# Patient Record
Sex: MALE | Race: WHITE | NOT HISPANIC OR LATINO | Employment: OTHER | URBAN - METROPOLITAN AREA
[De-identification: names, ages, dates, MRNs, and addresses within clinical notes are randomized per-mention and may not be internally consistent; named-entity substitution may affect disease eponyms.]

---

## 2023-11-16 ENCOUNTER — HOSPITAL ENCOUNTER (INPATIENT)
Facility: HOSPITAL | Age: 78
LOS: 3 days | Discharge: HOME | DRG: 871 | End: 2023-11-20
Attending: EMERGENCY MEDICINE | Admitting: INTERNAL MEDICINE
Payer: OTHER GOVERNMENT

## 2023-11-16 ENCOUNTER — APPOINTMENT (OUTPATIENT)
Dept: GENERAL RADIOLOGY | Facility: HOSPITAL | Age: 78
DRG: 871 | End: 2023-11-16
Payer: OTHER GOVERNMENT

## 2023-11-16 DIAGNOSIS — J44.1 COPD WITH ACUTE EXACERBATION: ICD-10-CM

## 2023-11-16 DIAGNOSIS — J18.9 PNEUMONIA DUE TO INFECTIOUS ORGANISM, UNSPECIFIED LATERALITY, UNSPECIFIED PART OF LUNG: Primary | ICD-10-CM

## 2023-11-16 DIAGNOSIS — A41.9 SEPSIS, DUE TO UNSPECIFIED ORGANISM, UNSPECIFIED WHETHER ACUTE ORGAN DYSFUNCTION PRESENT: ICD-10-CM

## 2023-11-16 DIAGNOSIS — J96.01 ACUTE RESPIRATORY FAILURE WITH HYPOXIA: ICD-10-CM

## 2023-11-16 DIAGNOSIS — A41.9 SEPTIC SHOCK: ICD-10-CM

## 2023-11-16 DIAGNOSIS — R65.21 SEPTIC SHOCK: ICD-10-CM

## 2023-11-16 LAB
ARTERIAL PATENCY WRIST A: POSITIVE
ATMOSPHERIC PRESS: 750.3 MMHG
BASE EXCESS BLDA CALC-SCNC: -0.5 MMOL/L (ref 0–2)
BDY SITE: ABNORMAL
CO2 BLDA-SCNC: 27.4 MMOL/L (ref 23–27)
DEVICE COMMENT: ABNORMAL
HCO3 BLDA-SCNC: 25.9 MMOL/L (ref 22–28)
HEMODILUTION: NO
INHALED O2 CONCENTRATION: 50 %
MODALITY: ABNORMAL
O2 A-A PPRESDIFF RESPIRATORY: 0.4 MMHG
PAW @ PEAK INSP FLOW SETTING VENT: 16 CMH2O
PCO2 BLDA: 47.6 MM HG (ref 35–45)
PEEP RESPIRATORY: 8 CM[H2O]
PH BLDA: 7.34 PH UNITS (ref 7.35–7.45)
PO2 BLDA: 135.2 MM HG (ref 80–100)
PSV: 7 CMH2O
SAO2 % BLDCOA: 98.9 % (ref 92–98.5)
SET MECH RESP RATE: 18
TOTAL RATE: 36 BREATHS/MINUTE
VENTILATOR MODE: ABNORMAL

## 2023-11-16 PROCEDURE — 87040 BLOOD CULTURE FOR BACTERIA: CPT | Performed by: EMERGENCY MEDICINE

## 2023-11-16 PROCEDURE — 71045 X-RAY EXAM CHEST 1 VIEW: CPT

## 2023-11-16 PROCEDURE — 80053 COMPREHEN METABOLIC PANEL: CPT | Performed by: EMERGENCY MEDICINE

## 2023-11-16 PROCEDURE — 82803 BLOOD GASES ANY COMBINATION: CPT

## 2023-11-16 PROCEDURE — 94799 UNLISTED PULMONARY SVC/PX: CPT

## 2023-11-16 PROCEDURE — 94660 CPAP INITIATION&MGMT: CPT

## 2023-11-16 PROCEDURE — 36415 COLL VENOUS BLD VENIPUNCTURE: CPT | Performed by: EMERGENCY MEDICINE

## 2023-11-16 PROCEDURE — 36600 WITHDRAWAL OF ARTERIAL BLOOD: CPT

## 2023-11-16 PROCEDURE — 25810000003 SODIUM CHLORIDE 0.9 % SOLUTION: Performed by: EMERGENCY MEDICINE

## 2023-11-16 PROCEDURE — 93010 ELECTROCARDIOGRAM REPORT: CPT | Performed by: INTERNAL MEDICINE

## 2023-11-16 PROCEDURE — 93005 ELECTROCARDIOGRAM TRACING: CPT | Performed by: EMERGENCY MEDICINE

## 2023-11-16 PROCEDURE — 84484 ASSAY OF TROPONIN QUANT: CPT | Performed by: EMERGENCY MEDICINE

## 2023-11-16 PROCEDURE — 85730 THROMBOPLASTIN TIME PARTIAL: CPT | Performed by: EMERGENCY MEDICINE

## 2023-11-16 PROCEDURE — 83605 ASSAY OF LACTIC ACID: CPT | Performed by: EMERGENCY MEDICINE

## 2023-11-16 PROCEDURE — 85379 FIBRIN DEGRADATION QUANT: CPT | Performed by: EMERGENCY MEDICINE

## 2023-11-16 PROCEDURE — 0202U NFCT DS 22 TRGT SARS-COV-2: CPT | Performed by: EMERGENCY MEDICINE

## 2023-11-16 PROCEDURE — 83880 ASSAY OF NATRIURETIC PEPTIDE: CPT | Performed by: EMERGENCY MEDICINE

## 2023-11-16 PROCEDURE — 85610 PROTHROMBIN TIME: CPT | Performed by: EMERGENCY MEDICINE

## 2023-11-16 PROCEDURE — 99285 EMERGENCY DEPT VISIT HI MDM: CPT

## 2023-11-16 PROCEDURE — 83735 ASSAY OF MAGNESIUM: CPT | Performed by: EMERGENCY MEDICINE

## 2023-11-16 PROCEDURE — 25010000002 CEFTRIAXONE PER 250 MG: Performed by: EMERGENCY MEDICINE

## 2023-11-16 PROCEDURE — 85025 COMPLETE CBC W/AUTO DIFF WBC: CPT | Performed by: EMERGENCY MEDICINE

## 2023-11-16 RX ORDER — SODIUM CHLORIDE 0.9 % (FLUSH) 0.9 %
10 SYRINGE (ML) INJECTION AS NEEDED
Status: DISCONTINUED | OUTPATIENT
Start: 2023-11-16 | End: 2023-11-20 | Stop reason: HOSPADM

## 2023-11-16 RX ADMIN — CEFTRIAXONE SODIUM 1000 MG: 1 INJECTION, POWDER, FOR SOLUTION INTRAMUSCULAR; INTRAVENOUS at 23:25

## 2023-11-16 RX ADMIN — SODIUM CHLORIDE 1000 ML: 9 INJECTION, SOLUTION INTRAVENOUS at 23:21

## 2023-11-17 ENCOUNTER — APPOINTMENT (OUTPATIENT)
Dept: CT IMAGING | Facility: HOSPITAL | Age: 78
DRG: 871 | End: 2023-11-17
Payer: OTHER GOVERNMENT

## 2023-11-17 PROBLEM — A41.9 SEPTIC SHOCK: Status: ACTIVE | Noted: 2023-11-17

## 2023-11-17 PROBLEM — R65.21 SEPTIC SHOCK: Status: ACTIVE | Noted: 2023-11-17

## 2023-11-17 LAB
ALBUMIN SERPL-MCNC: 3.9 G/DL (ref 3.5–5.2)
ALBUMIN/GLOB SERPL: 2 G/DL
ALP SERPL-CCNC: 148 U/L (ref 39–117)
ALT SERPL W P-5'-P-CCNC: 43 U/L (ref 1–41)
ANION GAP SERPL CALCULATED.3IONS-SCNC: 10 MMOL/L (ref 5–15)
APTT PPP: 167.8 SECONDS (ref 22.7–35.4)
APTT PPP: 24.8 SECONDS (ref 22.7–35.4)
APTT PPP: 36 SECONDS (ref 22.7–35.4)
AST SERPL-CCNC: 23 U/L (ref 1–40)
B PARAPERT DNA SPEC QL NAA+PROBE: NOT DETECTED
B PERT DNA SPEC QL NAA+PROBE: NOT DETECTED
BASOPHILS # BLD AUTO: 0.05 10*3/MM3 (ref 0–0.2)
BASOPHILS NFR BLD AUTO: 0.2 % (ref 0–1.5)
BILIRUB SERPL-MCNC: 0.7 MG/DL (ref 0–1.2)
BUN SERPL-MCNC: 19 MG/DL (ref 8–23)
BUN/CREAT SERPL: 16.4 (ref 7–25)
C PNEUM DNA NPH QL NAA+NON-PROBE: NOT DETECTED
CALCIUM SPEC-SCNC: 8.7 MG/DL (ref 8.6–10.5)
CHLORIDE SERPL-SCNC: 101 MMOL/L (ref 98–107)
CO2 SERPL-SCNC: 24 MMOL/L (ref 22–29)
CREAT SERPL-MCNC: 1.16 MG/DL (ref 0.76–1.27)
D DIMER PPP FEU-MCNC: 6.47 MCGFEU/ML (ref 0–0.78)
D-LACTATE SERPL-SCNC: 1.8 MMOL/L (ref 0.5–2)
D-LACTATE SERPL-SCNC: 2.6 MMOL/L (ref 0.5–2)
DEPRECATED RDW RBC AUTO: 42.1 FL (ref 37–54)
EGFRCR SERPLBLD CKD-EPI 2021: 64.5 ML/MIN/1.73
EOSINOPHIL # BLD AUTO: 0.04 10*3/MM3 (ref 0–0.4)
EOSINOPHIL NFR BLD AUTO: 0.2 % (ref 0.3–6.2)
ERYTHROCYTE [DISTWIDTH] IN BLOOD BY AUTOMATED COUNT: 12.6 % (ref 12.3–15.4)
FLUAV SUBTYP SPEC NAA+PROBE: NOT DETECTED
FLUBV RNA ISLT QL NAA+PROBE: NOT DETECTED
GEN 5 2HR TROPONIN T REFLEX: 41 NG/L
GLOBULIN UR ELPH-MCNC: 2 GM/DL
GLUCOSE BLDC GLUCOMTR-MCNC: 108 MG/DL (ref 70–130)
GLUCOSE BLDC GLUCOMTR-MCNC: 125 MG/DL (ref 70–130)
GLUCOSE BLDC GLUCOMTR-MCNC: 154 MG/DL (ref 70–130)
GLUCOSE BLDC GLUCOMTR-MCNC: 168 MG/DL (ref 70–130)
GLUCOSE SERPL-MCNC: 113 MG/DL (ref 65–99)
HADV DNA SPEC NAA+PROBE: NOT DETECTED
HCOV 229E RNA SPEC QL NAA+PROBE: NOT DETECTED
HCOV HKU1 RNA SPEC QL NAA+PROBE: NOT DETECTED
HCOV NL63 RNA SPEC QL NAA+PROBE: NOT DETECTED
HCOV OC43 RNA SPEC QL NAA+PROBE: NOT DETECTED
HCT VFR BLD AUTO: 45.7 % (ref 37.5–51)
HGB BLD-MCNC: 15.4 G/DL (ref 13–17.7)
HMPV RNA NPH QL NAA+NON-PROBE: NOT DETECTED
HPIV1 RNA ISLT QL NAA+PROBE: NOT DETECTED
HPIV2 RNA SPEC QL NAA+PROBE: NOT DETECTED
HPIV3 RNA NPH QL NAA+PROBE: NOT DETECTED
HPIV4 P GENE NPH QL NAA+PROBE: NOT DETECTED
IMM GRANULOCYTES # BLD AUTO: 0.17 10*3/MM3 (ref 0–0.05)
IMM GRANULOCYTES NFR BLD AUTO: 0.7 % (ref 0–0.5)
INR PPP: 0.93 (ref 0.9–1.1)
LYMPHOCYTES # BLD AUTO: 1.01 10*3/MM3 (ref 0.7–3.1)
LYMPHOCYTES NFR BLD AUTO: 4.4 % (ref 19.6–45.3)
M PNEUMO IGG SER IA-ACNC: NOT DETECTED
MAGNESIUM SERPL-MCNC: 1.1 MG/DL (ref 1.6–2.4)
MAGNESIUM SERPL-MCNC: 1.9 MG/DL (ref 1.6–2.4)
MCH RBC QN AUTO: 30.7 PG (ref 26.6–33)
MCHC RBC AUTO-ENTMCNC: 33.7 G/DL (ref 31.5–35.7)
MCV RBC AUTO: 91 FL (ref 79–97)
MONOCYTES # BLD AUTO: 0.5 10*3/MM3 (ref 0.1–0.9)
MONOCYTES NFR BLD AUTO: 2.2 % (ref 5–12)
NEUTROPHILS NFR BLD AUTO: 21.14 10*3/MM3 (ref 1.7–7)
NEUTROPHILS NFR BLD AUTO: 92.3 % (ref 42.7–76)
NRBC BLD AUTO-RTO: 0 /100 WBC (ref 0–0.2)
NT-PROBNP SERPL-MCNC: 1465 PG/ML (ref 0–1800)
PLATELET # BLD AUTO: 144 10*3/MM3 (ref 140–450)
PMV BLD AUTO: 9.4 FL (ref 6–12)
POTASSIUM SERPL-SCNC: 2.7 MMOL/L (ref 3.5–5.2)
POTASSIUM SERPL-SCNC: 4.9 MMOL/L (ref 3.5–5.2)
PROCALCITONIN SERPL-MCNC: 1.55 NG/ML (ref 0–0.25)
PROT SERPL-MCNC: 5.9 G/DL (ref 6–8.5)
PROTHROMBIN TIME: 12.6 SECONDS (ref 11.7–14.2)
QT INTERVAL: 306 MS
QTC INTERVAL: 434 MS
RBC # BLD AUTO: 5.02 10*6/MM3 (ref 4.14–5.8)
RHINOVIRUS RNA SPEC NAA+PROBE: NOT DETECTED
RSV RNA NPH QL NAA+NON-PROBE: NOT DETECTED
SARS-COV-2 RNA NPH QL NAA+NON-PROBE: NOT DETECTED
SODIUM SERPL-SCNC: 135 MMOL/L (ref 136–145)
TROPONIN T DELTA: 13 NG/L
TROPONIN T SERPL HS-MCNC: 28 NG/L
WBC NRBC COR # BLD AUTO: 22.91 10*3/MM3 (ref 3.4–10.8)

## 2023-11-17 PROCEDURE — 25010000002 PIPERACILLIN SOD-TAZOBACTAM PER 1 G: Performed by: INTERNAL MEDICINE

## 2023-11-17 PROCEDURE — 94799 UNLISTED PULMONARY SVC/PX: CPT

## 2023-11-17 PROCEDURE — 25010000002 METHYLPREDNISOLONE PER 40 MG: Performed by: INTERNAL MEDICINE

## 2023-11-17 PROCEDURE — 25010000002 AZITHROMYCIN PER 500 MG: Performed by: EMERGENCY MEDICINE

## 2023-11-17 PROCEDURE — 94660 CPAP INITIATION&MGMT: CPT

## 2023-11-17 PROCEDURE — 71275 CT ANGIOGRAPHY CHEST: CPT

## 2023-11-17 PROCEDURE — 25010000002 ENOXAPARIN PER 10 MG: Performed by: INTERNAL MEDICINE

## 2023-11-17 PROCEDURE — 25810000003 SODIUM CHLORIDE 0.9 % SOLUTION 250 ML FLEX CONT: Performed by: EMERGENCY MEDICINE

## 2023-11-17 PROCEDURE — 84484 ASSAY OF TROPONIN QUANT: CPT | Performed by: EMERGENCY MEDICINE

## 2023-11-17 PROCEDURE — 94761 N-INVAS EAR/PLS OXIMETRY MLT: CPT

## 2023-11-17 PROCEDURE — 94664 DEMO&/EVAL PT USE INHALER: CPT

## 2023-11-17 PROCEDURE — 85730 THROMBOPLASTIN TIME PARTIAL: CPT | Performed by: INTERNAL MEDICINE

## 2023-11-17 PROCEDURE — 93005 ELECTROCARDIOGRAM TRACING: CPT | Performed by: INTERNAL MEDICINE

## 2023-11-17 PROCEDURE — 25010000002 METHYLPREDNISOLONE PER 125 MG: Performed by: EMERGENCY MEDICINE

## 2023-11-17 PROCEDURE — 84145 PROCALCITONIN (PCT): CPT | Performed by: INTERNAL MEDICINE

## 2023-11-17 PROCEDURE — 25810000003 SODIUM CHLORIDE 0.9 % SOLUTION: Performed by: EMERGENCY MEDICINE

## 2023-11-17 PROCEDURE — 84132 ASSAY OF SERUM POTASSIUM: CPT | Performed by: INTERNAL MEDICINE

## 2023-11-17 PROCEDURE — 83735 ASSAY OF MAGNESIUM: CPT | Performed by: INTERNAL MEDICINE

## 2023-11-17 PROCEDURE — 83605 ASSAY OF LACTIC ACID: CPT | Performed by: EMERGENCY MEDICINE

## 2023-11-17 PROCEDURE — 25810000003 SODIUM CHLORIDE 0.9 % SOLUTION: Performed by: INTERNAL MEDICINE

## 2023-11-17 PROCEDURE — 93010 ELECTROCARDIOGRAM REPORT: CPT | Performed by: INTERNAL MEDICINE

## 2023-11-17 PROCEDURE — 25010000002 MAGNESIUM SULFATE 2 GM/50ML SOLUTION: Performed by: FAMILY MEDICINE

## 2023-11-17 PROCEDURE — 82948 REAGENT STRIP/BLOOD GLUCOSE: CPT

## 2023-11-17 PROCEDURE — 25510000001 IOPAMIDOL PER 1 ML: Performed by: INTERNAL MEDICINE

## 2023-11-17 PROCEDURE — 25010000002 HEPARIN (PORCINE) PER 1000 UNITS: Performed by: INTERNAL MEDICINE

## 2023-11-17 PROCEDURE — 63710000001 INSULIN LISPRO (HUMAN) PER 5 UNITS: Performed by: INTERNAL MEDICINE

## 2023-11-17 PROCEDURE — 83735 ASSAY OF MAGNESIUM: CPT | Performed by: FAMILY MEDICINE

## 2023-11-17 PROCEDURE — 92610 EVALUATE SWALLOWING FUNCTION: CPT

## 2023-11-17 PROCEDURE — 99254 IP/OBS CNSLTJ NEW/EST MOD 60: CPT | Performed by: INTERNAL MEDICINE

## 2023-11-17 PROCEDURE — 94640 AIRWAY INHALATION TREATMENT: CPT

## 2023-11-17 PROCEDURE — 25010000002 METHYLPREDNISOLONE PER 125 MG: Performed by: INTERNAL MEDICINE

## 2023-11-17 RX ORDER — METOPROLOL TARTRATE 50 MG/1
75 TABLET, FILM COATED ORAL 2 TIMES DAILY
COMMUNITY

## 2023-11-17 RX ORDER — METHYLPREDNISOLONE SODIUM SUCCINATE 40 MG/ML
40 INJECTION, POWDER, LYOPHILIZED, FOR SOLUTION INTRAMUSCULAR; INTRAVENOUS EVERY 8 HOURS
Status: DISCONTINUED | OUTPATIENT
Start: 2023-11-17 | End: 2023-11-18

## 2023-11-17 RX ORDER — ACETAMINOPHEN 325 MG/1
650 TABLET ORAL EVERY 4 HOURS PRN
Status: DISCONTINUED | OUTPATIENT
Start: 2023-11-17 | End: 2023-11-20 | Stop reason: HOSPADM

## 2023-11-17 RX ORDER — INSULIN LISPRO 100 [IU]/ML
2-7 INJECTION, SOLUTION INTRAVENOUS; SUBCUTANEOUS
Status: DISCONTINUED | OUTPATIENT
Start: 2023-11-17 | End: 2023-11-19

## 2023-11-17 RX ORDER — IPRATROPIUM BROMIDE AND ALBUTEROL SULFATE 2.5; .5 MG/3ML; MG/3ML
3 SOLUTION RESPIRATORY (INHALATION) ONCE
Status: COMPLETED | OUTPATIENT
Start: 2023-11-17 | End: 2023-11-17

## 2023-11-17 RX ORDER — HEPARIN SODIUM 5000 [USP'U]/ML
40-80 INJECTION, SOLUTION INTRAVENOUS; SUBCUTANEOUS EVERY 6 HOURS PRN
Status: DISCONTINUED | OUTPATIENT
Start: 2023-11-17 | End: 2023-11-18

## 2023-11-17 RX ORDER — BISACODYL 5 MG/1
5 TABLET, DELAYED RELEASE ORAL DAILY PRN
Status: DISCONTINUED | OUTPATIENT
Start: 2023-11-17 | End: 2023-11-20 | Stop reason: HOSPADM

## 2023-11-17 RX ORDER — NICOTINE POLACRILEX 4 MG
15 LOZENGE BUCCAL
Status: DISCONTINUED | OUTPATIENT
Start: 2023-11-17 | End: 2023-11-19

## 2023-11-17 RX ORDER — POLYETHYLENE GLYCOL 3350 17 G/17G
17 POWDER, FOR SOLUTION ORAL DAILY
Status: DISCONTINUED | OUTPATIENT
Start: 2023-11-17 | End: 2023-11-20 | Stop reason: HOSPADM

## 2023-11-17 RX ORDER — FAMOTIDINE 20 MG/1
20 TABLET, FILM COATED ORAL 2 TIMES DAILY
COMMUNITY

## 2023-11-17 RX ORDER — NITROGLYCERIN 0.4 MG/1
0.4 TABLET SUBLINGUAL
Status: DISCONTINUED | OUTPATIENT
Start: 2023-11-17 | End: 2023-11-20 | Stop reason: HOSPADM

## 2023-11-17 RX ORDER — ALUMINA, MAGNESIA, AND SIMETHICONE 2400; 2400; 240 MG/30ML; MG/30ML; MG/30ML
15 SUSPENSION ORAL EVERY 6 HOURS PRN
Status: DISCONTINUED | OUTPATIENT
Start: 2023-11-17 | End: 2023-11-20 | Stop reason: HOSPADM

## 2023-11-17 RX ORDER — POLYETHYLENE GLYCOL 3350 17 G/17G
17 POWDER, FOR SOLUTION ORAL DAILY
COMMUNITY

## 2023-11-17 RX ORDER — FAMOTIDINE 20 MG/1
20 TABLET, FILM COATED ORAL 2 TIMES DAILY
Status: DISCONTINUED | OUTPATIENT
Start: 2023-11-17 | End: 2023-11-20 | Stop reason: HOSPADM

## 2023-11-17 RX ORDER — MAGNESIUM SULFATE HEPTAHYDRATE 40 MG/ML
2 INJECTION, SOLUTION INTRAVENOUS
Status: COMPLETED | OUTPATIENT
Start: 2023-11-17 | End: 2023-11-18

## 2023-11-17 RX ORDER — POTASSIUM CHLORIDE 750 MG/1
40 TABLET, FILM COATED, EXTENDED RELEASE ORAL EVERY 4 HOURS
Status: COMPLETED | OUTPATIENT
Start: 2023-11-17 | End: 2023-11-18

## 2023-11-17 RX ORDER — GUAIFENESIN AND DEXTROMETHORPHAN HYDROBROMIDE 100; 10 MG/5ML; MG/5ML
5 SOLUTION ORAL EVERY 6 HOURS PRN
COMMUNITY

## 2023-11-17 RX ORDER — ATORVASTATIN CALCIUM 80 MG/1
80 TABLET, FILM COATED ORAL DAILY
COMMUNITY

## 2023-11-17 RX ORDER — IPRATROPIUM BROMIDE AND ALBUTEROL SULFATE 2.5; .5 MG/3ML; MG/3ML
3 SOLUTION RESPIRATORY (INHALATION)
Status: DISCONTINUED | OUTPATIENT
Start: 2023-11-17 | End: 2023-11-20 | Stop reason: HOSPADM

## 2023-11-17 RX ORDER — METHYLPREDNISOLONE SODIUM SUCCINATE 125 MG/2ML
60 INJECTION, POWDER, LYOPHILIZED, FOR SOLUTION INTRAMUSCULAR; INTRAVENOUS EVERY 6 HOURS
Status: DISCONTINUED | OUTPATIENT
Start: 2023-11-17 | End: 2023-11-17

## 2023-11-17 RX ORDER — AMOXICILLIN 250 MG
2 CAPSULE ORAL 2 TIMES DAILY
Status: DISCONTINUED | OUTPATIENT
Start: 2023-11-17 | End: 2023-11-20 | Stop reason: HOSPADM

## 2023-11-17 RX ORDER — FLUTICASONE PROPIONATE 50 MCG
2 SPRAY, SUSPENSION (ML) NASAL DAILY
COMMUNITY

## 2023-11-17 RX ORDER — ONDANSETRON 4 MG/1
4 TABLET, FILM COATED ORAL EVERY 6 HOURS PRN
Status: DISCONTINUED | OUTPATIENT
Start: 2023-11-17 | End: 2023-11-20 | Stop reason: HOSPADM

## 2023-11-17 RX ORDER — ASPIRIN 81 MG/1
81 TABLET ORAL DAILY
Status: DISCONTINUED | OUTPATIENT
Start: 2023-11-17 | End: 2023-11-20 | Stop reason: HOSPADM

## 2023-11-17 RX ORDER — GUAIFENESIN 600 MG/1
1200 TABLET, EXTENDED RELEASE ORAL 2 TIMES DAILY
Status: DISCONTINUED | OUTPATIENT
Start: 2023-11-17 | End: 2023-11-20 | Stop reason: HOSPADM

## 2023-11-17 RX ORDER — GUAIFENESIN 600 MG/1
1200 TABLET, EXTENDED RELEASE ORAL 2 TIMES DAILY
COMMUNITY

## 2023-11-17 RX ORDER — HEPARIN SODIUM 10000 [USP'U]/100ML
18 INJECTION, SOLUTION INTRAVENOUS
Status: DISCONTINUED | OUTPATIENT
Start: 2023-11-17 | End: 2023-11-18

## 2023-11-17 RX ORDER — NOREPINEPHRINE BITARTRATE 0.03 MG/ML
.02-.3 INJECTION, SOLUTION INTRAVENOUS
Status: DISCONTINUED | OUTPATIENT
Start: 2023-11-17 | End: 2023-11-17

## 2023-11-17 RX ORDER — IBUPROFEN 600 MG/1
1 TABLET ORAL
Status: DISCONTINUED | OUTPATIENT
Start: 2023-11-17 | End: 2023-11-19

## 2023-11-17 RX ORDER — BISACODYL 10 MG
10 SUPPOSITORY, RECTAL RECTAL DAILY PRN
Status: DISCONTINUED | OUTPATIENT
Start: 2023-11-17 | End: 2023-11-20 | Stop reason: HOSPADM

## 2023-11-17 RX ORDER — GUAIFENESIN/DEXTROMETHORPHAN 100-10MG/5
5 SYRUP ORAL EVERY 6 HOURS PRN
Status: DISCONTINUED | OUTPATIENT
Start: 2023-11-17 | End: 2023-11-20 | Stop reason: HOSPADM

## 2023-11-17 RX ORDER — FLUTICASONE PROPIONATE 50 MCG
2 SPRAY, SUSPENSION (ML) NASAL DAILY
Status: DISCONTINUED | OUTPATIENT
Start: 2023-11-17 | End: 2023-11-20 | Stop reason: HOSPADM

## 2023-11-17 RX ORDER — ONDANSETRON 2 MG/ML
4 INJECTION INTRAMUSCULAR; INTRAVENOUS EVERY 6 HOURS PRN
Status: DISCONTINUED | OUTPATIENT
Start: 2023-11-17 | End: 2023-11-20 | Stop reason: HOSPADM

## 2023-11-17 RX ORDER — METHYLPREDNISOLONE SODIUM SUCCINATE 125 MG/2ML
125 INJECTION, POWDER, LYOPHILIZED, FOR SOLUTION INTRAMUSCULAR; INTRAVENOUS ONCE
Status: COMPLETED | OUTPATIENT
Start: 2023-11-17 | End: 2023-11-17

## 2023-11-17 RX ORDER — POLYETHYLENE GLYCOL 3350 17 G/17G
17 POWDER, FOR SOLUTION ORAL DAILY PRN
Status: DISCONTINUED | OUTPATIENT
Start: 2023-11-17 | End: 2023-11-20 | Stop reason: HOSPADM

## 2023-11-17 RX ORDER — ALBUTEROL SULFATE 90 UG/1
2 AEROSOL, METERED RESPIRATORY (INHALATION) EVERY 4 HOURS PRN
COMMUNITY

## 2023-11-17 RX ORDER — ENOXAPARIN SODIUM 100 MG/ML
40 INJECTION SUBCUTANEOUS DAILY
Status: DISCONTINUED | OUTPATIENT
Start: 2023-11-17 | End: 2023-11-17

## 2023-11-17 RX ORDER — SODIUM CHLORIDE FOR INHALATION 7 %
4 VIAL, NEBULIZER (ML) INHALATION
Status: DISCONTINUED | OUTPATIENT
Start: 2023-11-17 | End: 2023-11-20 | Stop reason: HOSPADM

## 2023-11-17 RX ORDER — PREDNISONE 10 MG/1
TABLET ORAL SEE ADMIN INSTRUCTIONS
COMMUNITY
End: 2023-11-20 | Stop reason: HOSPADM

## 2023-11-17 RX ORDER — ATORVASTATIN CALCIUM 80 MG/1
80 TABLET, FILM COATED ORAL NIGHTLY
Status: DISCONTINUED | OUTPATIENT
Start: 2023-11-17 | End: 2023-11-20 | Stop reason: HOSPADM

## 2023-11-17 RX ORDER — DEXTROSE MONOHYDRATE 25 G/50ML
25 INJECTION, SOLUTION INTRAVENOUS
Status: DISCONTINUED | OUTPATIENT
Start: 2023-11-17 | End: 2023-11-19

## 2023-11-17 RX ORDER — SODIUM CHLORIDE 9 MG/ML
50 INJECTION, SOLUTION INTRAVENOUS CONTINUOUS
Status: ACTIVE | OUTPATIENT
Start: 2023-11-17 | End: 2023-11-18

## 2023-11-17 RX ORDER — ACETAMINOPHEN 650 MG/1
650 SUPPOSITORY RECTAL EVERY 4 HOURS PRN
Status: DISCONTINUED | OUTPATIENT
Start: 2023-11-17 | End: 2023-11-20 | Stop reason: HOSPADM

## 2023-11-17 RX ORDER — ASPIRIN 81 MG/1
81 TABLET ORAL DAILY
COMMUNITY

## 2023-11-17 RX ADMIN — METHYLPREDNISOLONE SODIUM SUCCINATE 40 MG: 40 INJECTION INTRAMUSCULAR; INTRAVENOUS at 16:43

## 2023-11-17 RX ADMIN — SODIUM CHLORIDE 1000 ML: 9 INJECTION, SOLUTION INTRAVENOUS at 01:18

## 2023-11-17 RX ADMIN — Medication 4 ML: at 15:03

## 2023-11-17 RX ADMIN — IPRATROPIUM BROMIDE AND ALBUTEROL SULFATE 3 ML: 2.5; .5 SOLUTION RESPIRATORY (INHALATION) at 15:02

## 2023-11-17 RX ADMIN — GUAIFENESIN 1200 MG: 600 TABLET, EXTENDED RELEASE ORAL at 21:52

## 2023-11-17 RX ADMIN — SODIUM CHLORIDE 1000 ML: 9 INJECTION, SOLUTION INTRAVENOUS at 00:08

## 2023-11-17 RX ADMIN — METHYLPREDNISOLONE SODIUM SUCCINATE 60 MG: 125 INJECTION, POWDER, FOR SOLUTION INTRAMUSCULAR; INTRAVENOUS at 08:10

## 2023-11-17 RX ADMIN — POLYETHYLENE GLYCOL 3350 17 G: 17 POWDER, FOR SOLUTION ORAL at 17:45

## 2023-11-17 RX ADMIN — METHYLPREDNISOLONE SODIUM SUCCINATE 125 MG: 125 INJECTION, POWDER, FOR SOLUTION INTRAMUSCULAR; INTRAVENOUS at 01:05

## 2023-11-17 RX ADMIN — INSULIN LISPRO 2 UNITS: 100 INJECTION, SOLUTION INTRAVENOUS; SUBCUTANEOUS at 21:52

## 2023-11-17 RX ADMIN — AZITHROMYCIN MONOHYDRATE 500 MG: 500 INJECTION, POWDER, LYOPHILIZED, FOR SOLUTION INTRAVENOUS at 00:08

## 2023-11-17 RX ADMIN — ASPIRIN 81 MG: 81 TABLET, COATED ORAL at 17:41

## 2023-11-17 RX ADMIN — MAGNESIUM SULFATE HEPTAHYDRATE 2 G: 2 INJECTION, SOLUTION INTRAVENOUS at 21:53

## 2023-11-17 RX ADMIN — INSULIN LISPRO 2 UNITS: 100 INJECTION, SOLUTION INTRAVENOUS; SUBCUTANEOUS at 16:42

## 2023-11-17 RX ADMIN — Medication 10 ML: at 19:26

## 2023-11-17 RX ADMIN — SODIUM CHLORIDE 125 ML/HR: 9 INJECTION, SOLUTION INTRAVENOUS at 02:51

## 2023-11-17 RX ADMIN — SODIUM CHLORIDE 50 ML/HR: 9 INJECTION, SOLUTION INTRAVENOUS at 14:19

## 2023-11-17 RX ADMIN — MAGNESIUM SULFATE HEPTAHYDRATE 2 G: 2 INJECTION, SOLUTION INTRAVENOUS at 19:25

## 2023-11-17 RX ADMIN — ENOXAPARIN SODIUM 40 MG: 100 INJECTION SUBCUTANEOUS at 02:55

## 2023-11-17 RX ADMIN — METOPROLOL TARTRATE 75 MG: 25 TABLET, FILM COATED ORAL at 21:51

## 2023-11-17 RX ADMIN — POTASSIUM CHLORIDE 40 MEQ: 750 TABLET, EXTENDED RELEASE ORAL at 18:24

## 2023-11-17 RX ADMIN — DOCUSATE SODIUM 50MG AND SENNOSIDES 8.6MG 2 TABLET: 8.6; 5 TABLET, FILM COATED ORAL at 21:52

## 2023-11-17 RX ADMIN — IPRATROPIUM BROMIDE AND ALBUTEROL SULFATE 3 ML: 2.5; .5 SOLUTION RESPIRATORY (INHALATION) at 06:59

## 2023-11-17 RX ADMIN — PIPERACILLIN SODIUM AND TAZOBACTAM SODIUM 3.38 G: 3; .375 INJECTION, SOLUTION INTRAVENOUS at 02:53

## 2023-11-17 RX ADMIN — IPRATROPIUM BROMIDE AND ALBUTEROL SULFATE 3 ML: 2.5; .5 SOLUTION RESPIRATORY (INHALATION) at 19:13

## 2023-11-17 RX ADMIN — SACUBITRIL AND VALSARTAN 1 TABLET: 24; 26 TABLET, FILM COATED ORAL at 22:23

## 2023-11-17 RX ADMIN — IPRATROPIUM BROMIDE AND ALBUTEROL SULFATE 3 ML: 2.5; .5 SOLUTION RESPIRATORY (INHALATION) at 23:26

## 2023-11-17 RX ADMIN — PIPERACILLIN SODIUM AND TAZOBACTAM SODIUM 3.38 G: 3; .375 INJECTION, SOLUTION INTRAVENOUS at 10:15

## 2023-11-17 RX ADMIN — POTASSIUM CHLORIDE 40 MEQ: 750 TABLET, EXTENDED RELEASE ORAL at 22:21

## 2023-11-17 RX ADMIN — IPRATROPIUM BROMIDE AND ALBUTEROL SULFATE 3 ML: 2.5; .5 SOLUTION RESPIRATORY (INHALATION) at 00:42

## 2023-11-17 RX ADMIN — IPRATROPIUM BROMIDE AND ALBUTEROL SULFATE 3 ML: 2.5; .5 SOLUTION RESPIRATORY (INHALATION) at 03:13

## 2023-11-17 RX ADMIN — HEPARIN SODIUM 18 UNITS/KG/HR: 10000 INJECTION, SOLUTION INTRAVENOUS at 08:57

## 2023-11-17 RX ADMIN — Medication 4 ML: at 19:13

## 2023-11-17 RX ADMIN — ATORVASTATIN CALCIUM 80 MG: 80 TABLET, FILM COATED ORAL at 21:52

## 2023-11-17 RX ADMIN — FAMOTIDINE 20 MG: 20 TABLET ORAL at 21:53

## 2023-11-17 RX ADMIN — PIPERACILLIN SODIUM AND TAZOBACTAM SODIUM 3.38 G: 3; .375 INJECTION, SOLUTION INTRAVENOUS at 16:44

## 2023-11-17 RX ADMIN — IOPAMIDOL 95 ML: 755 INJECTION, SOLUTION INTRAVENOUS at 10:33

## 2023-11-17 RX ADMIN — Medication 0.02 MCG/KG/MIN: at 01:49

## 2023-11-17 RX ADMIN — IPRATROPIUM BROMIDE AND ALBUTEROL SULFATE 3 ML: 2.5; .5 SOLUTION RESPIRATORY (INHALATION) at 10:46

## 2023-11-17 RX ADMIN — MAGNESIUM SULFATE HEPTAHYDRATE 2 G: 2 INJECTION, SOLUTION INTRAVENOUS at 22:21

## 2023-11-17 NOTE — PROGRESS NOTES
Clinical Pharmacy Services: Medication History    Wisam Malone is a 77 y.o. male presenting to [facility] for   Chief Complaint   Patient presents with    Respiratory Distress    Shortness of Breath       He  has no past medical history on file.    Allergies as of 11/16/2023    (No Known Allergies)       Medication information was obtained from: Pharmacy  Pharmacy and Phone Number:   [facility] - [place], [place] - [address] [phone]  - [phone]   [address] [address]  Phone: [phone] Fax: [phone]        Prior to Admission Medications       Prescriptions Last Dose Informant Patient Reported? Taking?    albuterol sulfate  (90 Base) MCG/ACT inhaler  Pharmacy Yes Yes    Inhale 2 puffs Every 4 (Four) Hours As Needed for Wheezing.    aspirin 81 MG EC tablet  Pharmacy Yes Yes    Take 1 tablet by mouth Daily.    atorvastatin (LIPITOR) 80 MG tablet  Pharmacy Yes Yes    Take 1 tablet by mouth Daily.    dextromethorphan-guaifenesin (ROBITUSSIN-DM)  MG/5ML syrup  Pharmacy Yes Yes    Take 5 mL by mouth Every 6 (Six) Hours As Needed.    famotidine (PEPCID) 20 MG tablet  Pharmacy Yes Yes    Take 1 tablet by mouth 2 (Two) Times a Day.    fluticasone (FLONASE) 50 MCG/ACT nasal spray  Pharmacy Yes Yes    2 sprays into the nostril(s) as directed by provider Daily.    guaiFENesin (MUCINEX) 600 MG 12 hr tablet  Pharmacy Yes Yes    Take 2 tablets by mouth 2 (Two) Times a Day.    ipratropium-albuterol (COMBIVENT RESPIMAT)  MCG/ACT inhaler  Pharmacy Yes Yes    Inhale 1 puff 4 (Four) Times a Day As Needed for Wheezing.    metoprolol tartrate (LOPRESSOR) 50 MG tablet  Pharmacy Yes Yes    Take 1.5 tablets by mouth 2 (Two) Times a Day.    polyethylene glycol (MIRALAX) 17 g packet  Pharmacy Yes Yes    Take 17 g by mouth Daily.    predniSONE (DELTASONE) 10 MG tablet  Pharmacy Yes Yes    Take  by mouth See Admin Instructions. 6 TABLETS DAILY FOR 3 DAYS   4  TABLETS DAILY FOR 3 DAYS   2 TABLETS DAILY FOR 3 DAYS   1 TABLET DAILY FOR 3 DAYS    sacubitril-valsartan (ENTRESTO) 24-26 MG tablet  Pharmacy Yes Yes    Take 1 tablet by mouth 2 (Two) Times a Day.    tiotropium bromide monohydrate (SPIRIVA RESPIMAT) 2.5 MCG/ACT aerosol solution inhaler  Pharmacy Yes Yes    Inhale 2 puffs Daily.              Medication notes:     This medication list is complete to the best of my knowledge as of 11/17/2023    Please call if questions.    Daniel Avila  Medication History Technician  [phone]    11/17/2023 12:28 EST

## 2023-11-17 NOTE — THERAPY EVALUATION
Acute Care - Speech Language Pathology   Swallow Initial Evaluation [facility]     Patient Name: Wisam Malone  : 1945  MRN: [id number]  Today's Date: 2023               Admit Date: 2023    Visit Dx:     ICD-10-CM ICD-9-CM   1. Pneumonia due to infectious organism, unspecified laterality, unspecified part of lung  J18.9 486   2. Acute respiratory failure with hypoxia  J96.01 518.81   3. COPD with acute exacerbation  J44.1 491.21   4. Sepsis, due to unspecified organism, unspecified whether acute organ dysfunction present  A41.9 038.9     995.91   5. Septic shock  A41.9 038.9    R65.21 785.52     995.92     Patient Active Problem List   Diagnosis    Septic shock     History reviewed. No pertinent past medical history.  History reviewed. No pertinent surgical history.    SLP Recommendation and Plan  SLP Swallowing Diagnosis: R/O pharyngeal dysphagia (23)  SLP Diet Recommendation: regular textures, thin liquids (23)  Recommended Precautions and Strategies: upright posture during/after eating, small bites of food and sips of liquid (23)  SLP Rec. for Method of Medication Administration: meds whole, as tolerated (23)     Monitor for Signs of Aspiration: yes (23)  Recommended Diagnostics: VFSS (MBS) (23)  Swallow Criteria for Skilled Therapeutic Interventions Met: demonstrates skilled criteria (23)  Anticipated Discharge Disposition (SLP): unknown (23)  Rehab Potential/Prognosis, Swallowing: good, to achieve stated therapy goals (23)  Therapy Frequency (Swallow): PRN (23)  Predicted Duration Therapy Intervention (Days): until discharge (23)  Oral Care Recommendations: Oral Care BID/PRN (23)                                      Oral Care Recommendations: Oral Care BID/PRN (23)    Plan of Care Reviewed With: patient  Outcome Evaluation: Swallow eval completed.  Bipap removed for eval and sats remained >90. Pt had inconsistent slight throat clear/cough as Bipap was removed and inconsistently throughout the eval. No overt s/s directly after trials including thin, pureed, soft, mixed, and regular. Mastication was functional. Laryngeal elevation was adequate with palpation. MD concerned for aspiration. Recommend continue on regular and thin; meds as tolerated; urpight for meals and 30 min after; slow rate; small bites/sips. ST to schedule VFSS Sat/Mon to r/o aspiration.      SWALLOW EVALUATION (last 72 hours)       SLP Adult Swallow Evaluation       Row Name 11/17/23 1600                   Rehab Evaluation    Document Type evaluation  -AW        Subjective Information no complaints  -AW        Patient Observations alert;cooperative;agree to therapy  -AW        Patient/Family/Caregiver Comments/Observations Pt's spouse and other family members were present.  -AW        Patient Effort good  -AW        Symptoms Noted During/After Treatment none  -AW           General Information    Patient Profile Reviewed yes  -AW        Current Method of Nutrition regular textures;thin liquids  -AW        Precautions/Limitations, Vision WFL;for purposes of eval  -AW        Precautions/Limitations, Hearing WFL;for purposes of eval  -AW        Prior Level of Function-Communication WFL  -AW        Prior Level of Function-Swallowing no diet consistency restrictions  -AW        Plans/Goals Discussed with patient;family;spouse/S.O.;agreed upon  -AW        Barriers to Rehab medically complex  -AW        Patient's Goals for Discharge return home  -AW        Family Goals for Discharge family did not state  -AW           Pain    Additional Documentation Pain Scale: Numbers Pre/Post-Treatment (Group)  -AW           Pain Scale: Numbers Pre/Post-Treatment    Pretreatment Pain Rating 0/10 - no pain  -AW        Posttreatment Pain Rating 0/10 - no pain  -AW           Oral Motor Structure and Function     Dentition Assessment upper dentures/partial in place;lower dentures/partial in place  -AW        Secretion Management WNL/WFL  -AW        Mucosal Quality moist, healthy  -AW           Oral Musculature and Cranial Nerve Assessment    Oral Motor General Assessment WFL  -AW           General Eating/Swallowing Observations    Respiratory Support Currently in Use other (see comments)  BiPap removed for eval. Sats remained >90  -AW        Eating/Swallowing Skills self-fed  -AW        Positioning During Eating upright in bed  -AW        Utensils Used cup;spoon;straw  -AW        Consistencies Trialed thin liquids;pureed;soft to chew textures;mixed consistency;regular textures  -AW           Clinical Swallow Eval    Clinical Swallow Evaluation Summary Swallow eval completed. Bipap removed for eval and sats remained >90. Pt had inconsistent slight throat clear/cough as Bipap was removed and inconsistently throughout the eval. No overt s/s directly after trials including thin, pureed, soft, mixed, and regular. Mastication was functional. Laryngeal elevation was adequate with palpation. MD concerned for aspiration. Recommend continue on regular and thin; meds as tolerated; urpight for meals and 30 min after; slow rate; small bites/sips. ST to schedule VFSS Sat/Mon to r/o aspiration.  -AW           SLP Evaluation Clinical Impression    SLP Swallowing Diagnosis R/O pharyngeal dysphagia  -AW        Functional Impact risk of aspiration/pneumonia  -AW        Rehab Potential/Prognosis, Swallowing good, to achieve stated therapy goals  -AW        Swallow Criteria for Skilled Therapeutic Interventions Met demonstrates skilled criteria  -AW           Recommendations    Therapy Frequency (Swallow) PRN  -AW        Predicted Duration Therapy Intervention (Days) until discharge  -AW        SLP Diet Recommendation regular textures;thin liquids  -AW        Recommended Diagnostics VFSS (MBS)  -AW        Recommended Precautions and Strategies  upright posture during/after eating;small bites of food and sips of liquid  -AW        Oral Care Recommendations Oral Care BID/PRN  -AW        SLP Rec. for Method of Medication Administration meds whole;as tolerated  -AW        Monitor for Signs of Aspiration yes  -AW        Anticipated Discharge Disposition (SLP) unknown  -AW           Swallow Goals (SLP)    Swallow STGs diet tolerance goal selection (SLP)  -AW           (STG) Patient will tolerate trials of    Consistencies Trialed (Tolerate trials) regular textures;thin liquids  -AW        Desired Outcome (Tolerate trials) without signs/symptoms of aspiration  -AW        [place] (Tolerate trials) independently (over 90% accuracy)  -AW        Time Frame (Tolerate trials) by discharge  -AW                  User Key  (r) = Recorded By, (t) = Taken By, (c) = Cosigned By      Initials Name Effective Dates    Pinky Stacy SLP 08/28/23 -                     EDUCATION  The patient has been educated in the following areas:   Dysphagia (Swallowing Impairment) Oral Care/Hydration.        SLP GOALS       Row Name 11/17/23 1600             (STG) Patient will tolerate trials of    Consistencies Trialed (Tolerate trials) regular textures;thin liquids  -AW      Desired Outcome (Tolerate trials) without signs/symptoms of aspiration  -AW      [place] (Tolerate trials) independently (over 90% accuracy)  -AW      Time Frame (Tolerate trials) by discharge  -AW                User Key  (r) = Recorded By, (t) = Taken By, (c) = Cosigned By      Initials Name Provider Type    Pinky Stacy SLP Speech and Language Pathologist                       Time Calculation:    Time Calculation- SLP       Row Name 11/17/23 1710             Time Calculation- SLP    SLP Start Time 1500  -AW      SLP Received On 11/17/23  -AW                User Key  (r) = Recorded By, (t) = Taken By, (c) = Cosigned By      Initials Name Provider Type    Pinky Stayc SLP Speech and Language  Pathologist                    Therapy Charges for Today       Code Description Service Date Service Provider Modifiers Qty    [id number]  ST EVAL ORAL PHARYNG SWALLOW 4 11/17/2023 Pinky Forrest, SLP GN 1                 Pinky Forrest, SLP  11/17/2023

## 2023-11-17 NOTE — ED PROVIDER NOTES
" EMERGENCY DEPARTMENT ENCOUNTER    Room Number:  13/13  PCP: System, Provider Not In  Patient Care Team:  System, Provider Not In as PCP - General   Independent Historians: Patient and EMS    HPI:  Chief Complaint: Shortness of breath    A complete HPI/ROS/PMH/PSH/SH/FH are unobtainable due to: Patient distress    Chronic or social conditions impacting patient care (Social Determinants of Health): None  (Financial Resource Strain / Food Insecurity / Transportation Needs / Physical Activity / Stress / Social Connections / Intimate Partner Violence / Housing Stability)    Context: Wisam Malone is a 77 y.o. male with history of coronary artery disease status post bypass surgery twice, COPD, hypertension, A-fib/a flutter who presents to the ED c/o acute shortness of breath just a few hours prior to call to EMS.  Patient says he has been doing his nebulizer treatments at home with no relief and is not on home oxygen.  Patient denies any new cough, fevers, or any illness leading up to his increased shortness of breath this evening admits to chronic cough.  Patient does report history of \"heart problems\".  Patient denies any history of needing admission in this much distress or ever requiring BiPAP in the past although [facility]  Patient arrives on EMS CPAP with improvement in route from initial hypoxia in the 80s and severe respiratory distress per EMS crew.  EMS had given 2 sublingual nitro despite patient being normotensive thinking that he might be flash pulmonary edema.    Review of prior external notes (non-ED) -and- Review of prior external test results outside of this encounter: Patient has scant medical records in our system.  There is a single entry from an urgent care visit in November 2022.  Patient was noted then to be on home oxygen.  Patient at that time had COVID.           Prescription drug monitoring program review:         PAST MEDICAL HISTORY  Active Ambulatory " Problems     Diagnosis Date Noted    No Active Ambulatory Problems     Resolved Ambulatory Problems     Diagnosis Date Noted    No Resolved Ambulatory Problems     No Additional Past Medical History         PAST SURGICAL HISTORY  No past surgical history on file.      FAMILY HISTORY  No family history on file.      SOCIAL HISTORY  Social History     Socioeconomic History    Marital status:          ALLERGIES  Patient has no known allergies.        REVIEW OF SYSTEMS  Review of Systems  Included in HPI  All systems reviewed and negative except for those discussed in HPI.      PHYSICAL EXAM    I have reviewed the triage vital signs and nursing notes.    ED Triage Vitals [11/16/23 2228]   Temp Heart Rate Resp BP SpO2   -- (!) 126 25 119/84 93 %      Temp src Heart Rate Source Patient Position BP Location FiO2 (%)   -- -- -- -- --       Physical Exam  GENERAL: Tachypneic but cooperative and alert  male who has been placed on BiPAP by a respiratory therapist  SKIN: Warm, dry  HENT: Normocephalic, atraumatic  EYES: no scleral icterus, EOMI, no conjunctivitis  CV: regular rhythm, accelerated rate  RESPIRATORY: Tachypneic with rhonchi bilaterally in anterior lung fields   ABDOMEN: soft, nontender, nondistended  MUSCULOSKELETAL: no deformity, no lower extremity edema, no calf tenderness to palpation  NEURO: alert, moves all extremities, follows commands                                                               LAB RESULTS  Recent Results (from the past 24 hour(s))   Respiratory Panel PCR w/COVID-19(SARS-CoV-2) KUSHAL/JAMI/MENA/PAD/COR/GARY In-House, NP Swab in UTM/VTM, 2 HR TAT - Swab, Nasopharynx    Collection Time: 11/16/23 10:37 PM    Specimen: Nasopharynx; Swab   Result Value Ref Range    ADENOVIRUS, PCR Not Detected Not Detected    Coronavirus 229E Not Detected Not Detected    Coronavirus HKU1 Not Detected Not Detected    Coronavirus NL63 Not Detected Not Detected    Coronavirus OC43 Not Detected Not  Detected    COVID19 Not Detected Not Detected - Ref. Range    Human Metapneumovirus Not Detected Not Detected    Human Rhinovirus/Enterovirus Not Detected Not Detected    Influenza A PCR Not Detected Not Detected    Influenza B PCR Not Detected Not Detected    Parainfluenza Virus 1 Not Detected Not Detected    Parainfluenza Virus 2 Not Detected Not Detected    Parainfluenza Virus 3 Not Detected Not Detected    Parainfluenza Virus 4 Not Detected Not Detected    RSV, PCR Not Detected Not Detected    Bordetella pertussis pcr Not Detected Not Detected    Bordetella parapertussis PCR Not Detected Not Detected    Chlamydophila pneumoniae PCR Not Detected Not Detected    Mycoplasma pneumo by PCR Not Detected Not Detected   ECG 12 Lead Dyspnea    Collection Time: 11/16/23 10:45 PM   Result Value Ref Range    QT Interval 306 ms    QTC Interval 434 ms   Blood Gas, Arterial -    Collection Time: 11/16/23 10:58 PM    Specimen: Arterial Blood   Result Value Ref Range    Site Right Radial     Paul's Test Positive     pH, Arterial 7.344 (L) 7.350 - 7.450 pH units    pCO2, Arterial 47.6 (H) 35.0 - 45.0 mm Hg    pO2, Arterial 135.2 (H) 80.0 - 100.0 mm Hg    HCO3, Arterial 25.9 22.0 - 28.0 mmol/L    Base Excess, Arterial -0.5 (L) 0.0 - 2.0 mmol/L    O2 Saturation, Arterial 98.9 (H) 92.0 - 98.5 %    A-a DO2 0.4 mmHg    CO2 Content 27.4 (H) 23 - 27 mmol/L    Barometric Pressure for Blood Gas 750.3000 mmHg    Modality BiPap     FIO2 50 %    Ventilator Mode BiLevel     Set [facility] Resp Rate 18     Rate 36 Breaths/minute    PEEP 8     PSV 7 cmH2O    PIP 16 cmH2O    Hemodilution No     Device Comment IP 15, SpO2 97%    Comprehensive Metabolic Panel    Collection Time: 11/16/23 11:55 PM    Specimen: Blood   Result Value Ref Range    Glucose 113 (H) 65 - 99 mg/dL    BUN 19 8 - 23 mg/dL    Creatinine 1.16 0.76 - 1.27 mg/dL    Sodium 135 (L) 136 - 145 mmol/L    Potassium 4.9 3.5 - 5.2 mmol/L    Chloride 101 98 - 107 mmol/L    CO2 24.0 22.0 -  29.0 mmol/L    Calcium 8.7 8.6 - 10.5 mg/dL    Total Protein 5.9 (L) 6.0 - 8.5 g/dL    Albumin 3.9 3.5 - 5.2 g/dL    ALT (SGPT) 43 (H) 1 - 41 U/L    AST (SGOT) 23 1 - 40 U/L    Alkaline Phosphatase 148 (H) 39 - 117 U/L    Total Bilirubin 0.7 0.0 - 1.2 mg/dL    Globulin 2.0 gm/dL    A/G Ratio 2.0 g/dL    BUN/Creatinine Ratio 16.4 7.0 - 25.0    Anion Gap 10.0 5.0 - 15.0 mmol/L    eGFR 64.5 >60.0 mL/min/1.73   Protime-INR    Collection Time: 11/16/23 11:55 PM    Specimen: Blood   Result Value Ref Range    Protime 12.6 11.7 - 14.2 Seconds    INR 0.93 0.90 - 1.10   aPTT    Collection Time: 11/16/23 11:55 PM    Specimen: Blood   Result Value Ref Range    PTT 24.8 22.7 - 35.4 seconds   BNP    Collection Time: 11/16/23 11:55 PM    Specimen: Blood   Result Value Ref Range    proBNP 1,465.0 0.0 - 1,800.0 pg/mL   High Sensitivity Troponin T    Collection Time: 11/16/23 11:55 PM    Specimen: Blood   Result Value Ref Range    HS Troponin T 28 (H) <22 ng/L   D-dimer, Quantitative    Collection Time: 11/16/23 11:55 PM    Specimen: Blood   Result Value Ref Range    D-Dimer, Quantitative 6.47 (H) 0.00 - 0.78 MCGFEU/mL   Lactic Acid, Plasma    Collection Time: 11/16/23 11:55 PM    Specimen: Blood   Result Value Ref Range    Lactate 2.6 (C) 0.5 - 2.0 mmol/L   Magnesium    Collection Time: 11/16/23 11:55 PM    Specimen: Blood   Result Value Ref Range    Magnesium 1.9 1.6 - 2.4 mg/dL   CBC Auto Differential    Collection Time: 11/16/23 11:55 PM    Specimen: Blood   Result Value Ref Range    WBC 22.91 (H) 3.40 - 10.80 10*3/mm3    RBC 5.02 4.14 - 5.80 10*6/mm3    Hemoglobin 15.4 13.0 - 17.7 g/dL    Hematocrit 45.7 37.5 - 51.0 %    MCV 91.0 79.0 - 97.0 fL    MCH 30.7 26.6 - 33.0 pg    MCHC 33.7 31.5 - 35.7 g/dL    RDW 12.6 12.3 - 15.4 %    RDW-SD 42.1 37.0 - 54.0 fl    MPV 9.4 6.0 - 12.0 fL    Platelets 144 140 - 450 10*3/mm3    Neutrophil % 92.3 (H) 42.7 - 76.0 %    Lymphocyte % 4.4 (L) 19.6 - 45.3 %    Monocyte % 2.2 (L) 5.0 - 12.0 %     Eosinophil % 0.2 (L) 0.3 - 6.2 %    Basophil % 0.2 0.0 - 1.5 %    Immature Grans % 0.7 (H) 0.0 - 0.5 %    Neutrophils, Absolute 21.14 (H) 1.70 - 7.00 10*3/mm3    Lymphocytes, Absolute 1.01 0.70 - 3.10 10*3/mm3    Monocytes, Absolute 0.50 0.10 - 0.90 10*3/mm3    Eosinophils, Absolute 0.04 0.00 - 0.40 10*3/mm3    Basophils, Absolute 0.05 0.00 - 0.20 10*3/mm3    Immature Grans, Absolute 0.17 (H) 0.00 - 0.05 10*3/mm3    nRBC 0.0 0.0 - 0.2 /100 WBC   High Sensitivity Troponin T 2Hr    Collection Time: 11/17/23  2:06 AM    Specimen: Blood   Result Value Ref Range    HS Troponin T 41 (H) <22 ng/L    Troponin T Delta 13 (C) >=-4 - <+4 ng/L   STAT Lactic Acid, Reflex    Collection Time: 11/17/23  2:06 AM    Specimen: Blood   Result Value Ref Range    Lactate 1.8 0.5 - 2.0 mmol/L       I ordered the above labs and independently reviewed the results.        RADIOLOGY  XR Chest 1 View    Result Date: 11/16/2023  SINGLE VIEW OF THE CHEST  HISTORY: Shortness of breath  COMPARISON: None available.  FINDINGS: Patient status post median sternotomy with coronary artery bypass grafting. Heart size is within normal limits. No pneumothorax or pleural effusion is seen. The patient is noted to have infiltrates at the left lung base, suspicious for pneumonia. There are background changes of COPD.      Infiltrates identified within the left lung, suspicious for pneumonia. Follow-up exam to document resolution is recommended.  This report was finalized on 11/16/2023 11:26 PM by Dr. Rosa Sanders M.D on Workstation: [id number]       I ordered the above noted radiological studies. Reviewed by me. See dictation for official radiology interpretation.      PROCEDURES    Critical Care    Performed by: Corinne Mancia MD  Authorized by: Corinne Mancia MD    Critical care provider statement:     Critical care time (minutes):  46    Critical care time was exclusive of:  Separately billable procedures and treating other patients     Critical care was necessary to treat or prevent imminent or life-threatening deterioration of the following conditions:  Sepsis, shock and respiratory failure    Critical care was time spent personally by me on the following activities:  Development of treatment plan with patient or surrogate, discussions with consultants, evaluation of patient's response to treatment, examination of patient, obtaining history from patient or surrogate, ordering and review of laboratory studies, ordering and review of radiographic studies, pulse oximetry and re-evaluation of patient's condition    Care discussed with: admitting provider          MEDICATIONS GIVEN IN ER    Medications   sodium chloride 0.9 % flush 10 mL (has no administration in time range)   norepinephrine (LEVOPHED) 8 mg in 250 mL NS infusion (premix) (0.02 mcg/kg/min × 72.6 kg Intravenous New Bag 11/17/23 0149)   piperacillin-tazobactam (ZOSYN) 3.375 g in iso-osmotic dextrose 50 ml (premix) (has no administration in time range)   piperacillin-tazobactam (ZOSYN) 3.375 g in iso-osmotic dextrose 50 ml (premix) (has no administration in time range)   ipratropium-albuterol (DUO-NEB) nebulizer solution 3 mL (has no administration in time range)   ipratropium-albuterol (DUO-NEB) nebulizer solution 3 mL (has no administration in time range)   methylPREDNISolone sodium succinate (SOLU-Medrol) injection 60 mg (60 mg Intravenous Not Given 11/17/23 0244)   sodium chloride 0.9 % infusion (has no administration in time range)   nitroglycerin (NITROSTAT) SL tablet 0.4 mg (has no administration in time range)   aluminum-magnesium hydroxide-simethicone (MAALOX MAX) 400-400-40 MG/5ML suspension 15 mL (has no administration in time range)   sennosides-docusate (PERICOLACE) 8.6-50 MG per tablet 2 tablet (has no administration in time range)     And   polyethylene glycol (MIRALAX) packet 17 g (has no administration in time range)     And   bisacodyl (DULCOLAX) EC tablet 5 mg (has no  administration in time range)     And   bisacodyl (DULCOLAX) suppository 10 mg (has no administration in time range)   acetaminophen (TYLENOL) tablet 650 mg (has no administration in time range)     Or   acetaminophen (TYLENOL) suppository 650 mg (has no administration in time range)   ondansetron (ZOFRAN) tablet 4 mg (has no administration in time range)     Or   ondansetron (ZOFRAN) injection 4 mg (has no administration in time range)   Enoxaparin Sodium (LOVENOX) syringe 40 mg (has no administration in time range)   sodium chloride 0.9 % bolus 1,000 mL (0 mL Intravenous Stopped 11/17/23 0104)   cefTRIAXone (ROCEPHIN) 1,000 mg in sodium chloride 0.9 % 100 mL IVPB-VTB (0 mg Intravenous Stopped 11/17/23 0025)   azithromycin (ZITHROMAX) 500 mg in sodium chloride 0.9 % 250 mL IVPB-VTB (0 mg Intravenous Stopped 11/17/23 0141)   sodium chloride 0.9 % bolus 1,000 mL (0 mL Intravenous Stopped 11/17/23 0120)   ipratropium-albuterol (DUO-NEB) nebulizer solution 3 mL (3 mL Nebulization Given 11/17/23 0042)   methylPREDNISolone sodium succinate (SOLU-Medrol) injection 125 mg (125 mg Intravenous Given 11/17/23 0105)   sodium chloride 0.9 % bolus 1,000 mL (0 mL Intravenous Stopped 11/17/23 0141)         ORDERS PLACED DURING THIS VISIT:  Orders Placed This Encounter   Procedures    Critical Care    Respiratory Panel PCR w/COVID-19(SARS-CoV-2) KUSHAL/JAMI/MENA/PAD/COR/GARY In-House, NP Swab in UTM/VTM, 2 HR TAT - Swab, Nasopharynx    Blood Culture - Blood,    Blood Culture - Blood,    Respiratory Culture - Sputum, Cough    XR Chest 1 View    Comprehensive Metabolic Panel    Protime-INR    aPTT    Blood Gas, Arterial -    BNP    High Sensitivity Troponin T    D-dimer, Quantitative    Lactic Acid, Plasma    Magnesium    CBC Auto Differential    Blood Gas, Arterial -    High Sensitivity Troponin T 2Hr    STAT Lactic Acid, Reflex    Diet: Regular/House Diet; Texture: Regular Texture (IDDSI 7); Fluid Consistency: Thin (IDDSI 0)    Vital  Signs Per hospital policy    Telemetry - Place Orders & Notify Provider of Results When Patient Experiences Acute Chest Pain, Dysrhythmia or Respiratory Distress    Continuous Pulse Oximetry    Height and weight    Daily Weights    Intake and Output    Oral Care - Patient Not on NPPV & Not Intubated    Target Arousal Level RASS 0 to -1    If Patient has BG of < 80 and is symptomatic but not on an IV insulin protocol then use the Adult Hypoglycemia Treatment Orders.    Saline Lock    Place Order to Consult Intensivist For Critical Care Management (If Patient Not Admitted to Cardiology for Primary Cardiology Condition)    Notify All Physicians When Patient is Transferred    Use Mobility Guidelines for Advancement of Activity    Code Status and Medical Interventions:    Pulmonology (on-call MD unless specified)    Inpatient Consult to Case Management     NIPPV (CPAP or BIPAP)    POC Glucose Q4H    ECG 12 Lead Dyspnea    Insert Peripheral IV    Inpatient Admission    CBC & Differential         PROGRESS, DATA ANALYSIS, CONSULTS, AND MEDICAL DECISION MAKING    All labs have been independently interpreted by me.  All radiology studies have been reviewed by me.   EKG's independently viewed and interpreted by me.  Discussion below represents my analysis of pertinent findings related to patient's condition, differential diagnosis, treatment plan and final disposition.    MDM this patient presents with dyspnea, most likely secondary to his COPD with concern for acute exacerbation plus or minus pneumonia.  The differential diagnosis list includes but is not limited to:   - acute cardiac etiologies like ACS (non ischemic ekg, unremarkable trop), CHF, pericardial effusion or even tamponade, flash pulmonary edema  - acute respiratory etiologies like acute PE (Wells/PERC), pneumothorax , asthma, COPD exacerbation, allergic etiologies, or infectious etiologies such as PNA   - non-cardiopulmonary causes like  toxidromes, metabolic etiologies such as acidemia or electrolyte derangements or even DKA, sepsis, neurologic causes including GBS and myasthenia gravis  Plan EKG, CXR, Labs incl bnp and trop and +/- viral swab.    ED Course as of 11/17/23 0249   Thu Nov 16, 2023   2300 Patient placed on BiPAP at 50% FiO2 and pressures 16/8.  ABG on the settings with pH 7.34, PCO2 47, and PO2 135.  Patient with improving work of breathing but will need frequent reassessments. [AR]   2300 EKG ER MD interpretation   Time: 22:45  Rhythm and rate: Sinus tachycardia at a rate of 121  LVH evident but so much baseline artifact that further interpretation is markedly limited, I requested a repeat [AR]   2355 Patient had large bowel movement.  Heart rate in the 90s but blood pressure initially read low 80s over 50s.  Will obtain manual blood pressure and reassess IV fluid status. [AR]   Fri Nov 17, 2023   0107 I discussed results thus far with patient and family.  Patient's heart rate is down to the 90s he is breathing more comfortably on current BiPAP settings.  IV antibiotics have been ordered and patient is still in progress of receiving his second liter of IV fluids.  Plan for CT PE protocol given abrupt onset of shortness of breath and degree of tachycardia along with elevated D-dimer although patient does clinically appear to have pneumonia. [AR]   0143 Patient with blood pressure that is trended down instead of up despite patient looking clinically improved regarding his work of breathing and his heart rate.  Patient has been given several fluid boluses but MAP is not reassuring.  Will start pressors and titrate.  Call to pulmonology for ICU admission.  With patient's low blood pressure, patient not currently stable enough to go to CT scan.  CT chest PE protocol was canceled. [AR]   0149 Second lactic and second troponin pending [AR]   0212 I discussed patient's case with Dr. Post who is on for pulmonary critical care.  Patient is  accepted to the ICU for admission. [AR]      ED Course User Index  [AR] Corinne Mancia MD   __________________________  Although initially patient not with septic shock, his blood pressure decreased enough to be considered septic shock after sepsis fluid boluses given.   SEPTIC SHOCK FOCUSED EXAM ATTESTATION  I attest that I have reassessed tissue perfusion after the fluid bolus given.  His blood pressures are not reassuring so pressors will be added and ICU admission requested.  Corinne Mancia MD  11/17/23  02:49 EST     ___________________________      PPE: I wore and adhered to appropriate PPE per hospital protocols for specific patient presentation. (For respiratory patients with suspected Covid-19 or other infectious etiology suspected for patient's symptoms, the patient wore a mask and I wore an N95 mask throughout the entire patient encounter.) Proper hand hygiene both before and after patient encounter was performed as well.         AS OF 02:49 EST VITALS:    BP - 92/62  HR - 77  TEMP - 99.7 °F (37.6 °C) (Tympanic)  O2 SATS - 96%        DIAGNOSIS  Final diagnoses:   Pneumonia due to infectious organism, unspecified laterality, unspecified part of lung   Acute respiratory failure with hypoxia   COPD with acute exacerbation   Sepsis, due to unspecified organism, unspecified whether acute organ dysfunction present   Septic shock         DISPOSITION  ED Disposition       ED Disposition   Decision to Admit    Condition   --    Comment   Level of Care: Critical Care [6]   Diagnosis: Septic shock [[id number]]   Admitting Physician: ROCKY LION [[id number]]   Attending Physician: ROCKY LION [[id number]]   Certification: I Certify That Inpatient Hospital Services Are Medically Necessary For Greater Than 2 Midnights                    Note Disclaimer: At [facility], we believe that sharing information builds trust and better relationships. You are receiving this note because [facility] recently [facility]. It is possible you will see health information before a provider has talked with you about it. This kind of information can be easy to misunderstand. To help you fully understand what it means for your health, we urge you to discuss this note with your provider.         Corinne Mancia MD  11/17/23 0222       Corinne Mancia MD  11/17/23 0243       Corinne Mancia MD  11/17/23 0249

## 2023-11-17 NOTE — PLAN OF CARE
Goal Outcome Evaluation:  Plan of Care Reviewed With: patient        Progress: no change  Outcome Evaluation: Arrived from ER around 1230 on bipap.  Ambulated to bathroom with nurse aide; SBP right after ambulation in 80s, then 135/57 after in bed a few minutes.  Denies shortness of air.  Family at bedside.  Heparin gtt infusing.  Speech therapist recommends video swallow.    Having short runs of v tach - Potassium & Mg labs low so replacement started.

## 2023-11-17 NOTE — H&P
Patient Identification:  Name: Wisam Malone  Age: 77 y.o.  Sex: male  :  1945  MRN: [id number]                     Primary Care Physician: System, Provider Not In    Reason for visit:   Shortness of breath    History of Present Illness:   77-year-old male with a history of COPD presents with shortness of breath.  Symptoms of shortness of breath started several hours prior to calling EMS.  His nebulized medications at home were not helping.  Does not use oxygen at home but apparently a couple years ago was prescribed oxygen.  EMS was concerned for flash pulmonary edema and he received sublingual nitro and was on CPAP in the ambulance.  His initial saturations were in the low 80s and he had improvement with positive airway pressure.  In the emergency room they evaluated and felt it was more consistent with pneumonia.  Chest x-ray shows new left lower lobe infiltrate with leukocytosis.  Received antibiotics and was initially going to be admitted to the floor to hospitalist service but developed hypotension and now requiring pressor.    Past Medical History:  No past medical history on file.  Past Surgical History:  No past surgical history on file.   Home Meds:  (Not in a hospital admission)      Allergies:  No Known Allergies  Immunizations:  Immunization History   Administered Date(s) Administered    COVID-19 (PFIZER) BIVALENT 12+YRS 10/28/2022    COVID-19 (PFIZER) Purple Cap Monovalent 2021, 2021, 10/09/2021    COVID-19 F23 (MODERNA) 12YRS+ (SPIKEVAX) 10/16/2023    Covid-19 (Pfizer) Gray Cap Monovalent 2022     Social History:   Social History     Social History Narrative    Not on file     Social History     Tobacco Use    Smoking status: Not on file    Smokeless tobacco: Not on file   Substance Use Topics    Alcohol use: Not on file     Family History:  No family history on file.     Review of Systems  All below listed negative with exceptions of what is noted in the above  "mentioned history.  Denies fevers or chills  Denies nausea or vomiting  No new vision or hearing changes  No chest pain  shortness of breath  No diarrhea, hematemesis or hematochezia, no dysuria or frequency  No musculoskeletal complaints  No heat or cold intolerance  No skin rashes  No dizziness or confusion.  No seizure activity  No new anxiety or depression  12 system review of systems performed and all else negative    Objective:  tMax 24 hrs: Temp (24hrs), Av.7 °F (37.6 °C), Min:99.7 °F (37.6 °C), Max:99.7 °F (37.6 °C)    Vitals Ranges:   Temp:  [99.7 °F (37.6 °C)] 99.7 °F (37.6 °C)  Heart Rate:  [] 80  Resp:  [24-26] 24  BP: ()/(49-84) 88/62      Exam:  BP (!) 88/62   Pulse 80   Temp 99.7 °F (37.6 °C) (Tympanic)   Resp 24   Ht 182.9 cm (72\")   Wt 72.6 kg (160 lb)   SpO2 97%   BMI 21.70 kg/m²     GENERAL APPEARANCE:   Well developed  Well nourished  No acute distress   EYES:    PERRL                                                                           Conjunctiva normal  Sclera non-icteric.  HENT:   Atraumatic, normocephalic  External ears and nose normal  Moist mucous membranes and no ulcers  NECK:  Thyroid not enlarged  Trachea midline   RESPIRATORY:    Mildly labored breathing   Coarse left basilar breath sounds  No rales. +wheezing  No dullness  CARDIOVASCULAR:    RRR  Normal S1, S2  No murmur  Lower extremity edema: none    Peripheral pulses present.    GI:   Bowel sounds normal  Abdomen soft , non-distended, non-tender  MUSCULOSKELETAL:  Normal movement of extremities  No tenderness, no deformities  No clubbing or cyanosis   Skin:    No visible rashes  No palpable nodules.  No mottling.   PSYCHIATRIC:  Normal mood and affect  Oriented to person, place and time  NEUROLOGIC:   Cranial nerves II through XII grossly intact.  Sensation intact.  .     Data Review:  Labs reviewed  Results from last 7 days   Lab Units 23  2355   SODIUM mmol/L 135*   POTASSIUM mmol/L 4.9 "   CHLORIDE mmol/L 101   CO2 mmol/L 24.0   BUN mg/dL 19   CREATININE mg/dL 1.16   CALCIUM mg/dL 8.7   BILIRUBIN mg/dL 0.7   ALK PHOS U/L 148*   ALT (SGPT) U/L 43*   AST (SGOT) U/L 23   GLUCOSE mg/dL 113*     Results from last 7 days   Lab Units 11/16/23  2355   WBC 10*3/mm3 22.91*   HEMOGLOBIN g/dL 15.4   HEMATOCRIT % 45.7   PLATELETS 10*3/mm3 144     Results from last 7 days   Lab Units 11/16/23  2258   PH, ARTERIAL pH units 7.344*   PO2 ART mm Hg 135.2*   PCO2, ARTERIAL mm Hg 47.6*   HCO3 ART mmol/L 25.9           Imaging reviewed  Chest x-ray reviewed shows left basilar infiltrate            Assessment:  Sepsis with shock requiring pressors  Left lower lobe pneumonia  COPD  Acute hypoxemic respiratory failure secondary to above  History of atrial fibrillation/flutter  CAD with history of CABG  Hypertension      Plan:  Admit to intensive care.  Antibiotics for pneumonia and will narrow based on culture results.  IV fluid resuscitation and pressor as needed for blood pressure support.  NIPPV for help with respiratory failure. Wean oxygen as able.  Bronchodilators for COPD and help with clearance.  Control glucose.  Control blood pressure.  DVT prophylaxis.        Temo Post MD  [facility], [facility]  Pulmonary and Critical Care Medicine    11/17/2023  02:07 EST

## 2023-11-17 NOTE — CONSULTS
Date of Consultation: 23    Referral Provider: Temo Post MD     Reason for Consultation: Elevated troponin.    Encounter Provider: Frankie Maravilla MD    Group of Service: [facility]     Patient Name: Wisam Malone    :1945    Chief complaint: Shortness of breath.    History of Present Illness:      This is a very pleasant 77 year-old male with a history of coronary artery disease, status post 2 prior CABG surgeries remotely, paroxysmal atrial fibrillation, COPD, and hypertension.  He receives most of his care at the [facility] in [place].  Unfortunately, I was unable to obtain his records before the conclusion of the day and the weekend.    The patient presented to the emergency department with increasing shortness of breath.  He tried nebulizers at home, although this did not help.  EMS arrived, and he was found to be in significant respiratory distress.  He was placed on CPAP in the ambulance, and he was noted to have oxygen saturations in the lower 80s.  He is evaluation in the emergency department was consistent with left lower lobe pneumonia and COPD exacerbation.  He also was briefly hypotensive, and required pressors, although this improved.  His high-sensitivity troponin was initially 28, and then was 41.  He has not had any chest pain.    I can see an echocardiogram from [place] on 2021, at which time his EF was 20 to 25%, and there was evidence of a prior anterior infarct.      History reviewed. No pertinent past medical history.      History reviewed. No pertinent surgical history.      No Known Allergies      No current facility-administered medications on file prior to encounter.     Current Outpatient Medications on File Prior to Encounter   Medication Sig Dispense Refill    albuterol sulfate  (90 Base) MCG/ACT inhaler Inhale 2 puffs Every 4 (Four) Hours As Needed for Wheezing.      aspirin 81 MG EC tablet Take 1 tablet by mouth Daily.  "     atorvastatin (LIPITOR) 80 MG tablet Take 1 tablet by mouth Daily.      dextromethorphan-guaifenesin (ROBITUSSIN-DM)  MG/5ML syrup Take 5 mL by mouth Every 6 (Six) Hours As Needed.      famotidine (PEPCID) 20 MG tablet Take 1 tablet by mouth 2 (Two) Times a Day.      fluticasone (FLONASE) 50 MCG/ACT nasal spray 2 sprays into the nostril(s) as directed by provider Daily.      guaiFENesin (MUCINEX) 600 MG 12 hr tablet Take 2 tablets by mouth 2 (Two) Times a Day.      ipratropium-albuterol (COMBIVENT RESPIMAT)  MCG/ACT inhaler Inhale 1 puff 4 (Four) Times a Day As Needed for Wheezing.      metoprolol tartrate (LOPRESSOR) 50 MG tablet Take 1.5 tablets by mouth 2 (Two) Times a Day.      polyethylene glycol (MIRALAX) 17 g packet Take 17 g by mouth Daily.      predniSONE (DELTASONE) 10 MG tablet Take  by mouth See Admin Instructions. 6 TABLETS DAILY FOR 3 DAYS   4 TABLETS DAILY FOR 3 DAYS   2 TABLETS DAILY FOR 3 DAYS   1 TABLET DAILY FOR 3 DAYS      sacubitril-valsartan (ENTRESTO) 24-26 MG tablet Take 1 tablet by mouth 2 (Two) Times a Day.      tiotropium bromide monohydrate (SPIRIVA RESPIMAT) 2.5 MCG/ACT aerosol solution inhaler Inhale 2 puffs Daily.           Social History     Socioeconomic History    Marital status:          History reviewed. No pertinent family history.    REVIEW OF SYSTEMS:   Pertinent positives are noted in the HPI above.  Otherwise, all other systems were reviewed, and are negative.     Objective:     Vitals:    11/17/23 1752 11/17/23 1753 11/17/23 1913 11/17/23 1924   BP:       BP Location:       Patient Position:       Pulse:   71 68   Resp:   21 22   Temp:       TempSrc:       SpO2: 94% 94% 95% 100%   Weight:       Height:         Body mass index is 21.7 kg/m².  Flowsheet Rows      Flowsheet Row First Filed Value   Admission Height 182.9 cm (72\") Documented at 11/16/2023 2230   Admission Weight 72.6 kg (160 lb) Documented at 11/16/2023 2230             General:    No acute " distress, alert and oriented x4, pleasant                   Head:    Normocephalic, atraumatic.   Eyes:          Conjunctivae and sclerae normal, no icterus.   Throat:   No oral lesions, no thrush, oral mucosa moist.    Neck:   Supple, trachea midline.   Lungs:     Coarse breath sounds and expiratory wheezes bilaterally.    Heart:    Regular rhythm and normal rate.  No murmurs, gallops, or rubs noted.   Abdomen:     Soft, non-tender, non-distended, positive bowel sounds.    Extremities:   No clubbing, cyanosis, or edema.     Pulses:   Pulses palpable and equal bilaterally.    Skin:   No bleeding or rash.   Neuro:   Non-focal.  Moves all extremities well.    Psychiatric:   Normal mood and affect.         Lab Review:                Results from last 7 days   Lab Units 11/17/23  1721 11/16/23  2355   SODIUM mmol/L  --  135*   POTASSIUM mmol/L 2.7* 4.9   CHLORIDE mmol/L  --  101   CO2 mmol/L  --  24.0   BUN mg/dL  --  19   CREATININE mg/dL  --  1.16   GLUCOSE mg/dL  --  113*   CALCIUM mg/dL  --  8.7     Results from last 7 days   Lab Units 11/17/23  0206 11/16/23  2355   HSTROP T ng/L 41* 28*     Results from last 7 days   Lab Units 11/16/23  2355   WBC 10*3/mm3 22.91*   HEMOGLOBIN g/dL 15.4   HEMATOCRIT % 45.7   PLATELETS 10*3/mm3 144     Results from last 7 days   Lab Units 11/17/23  1526 11/17/23  0856 11/16/23  2355   INR   --   --  0.93   APTT seconds 167.8* 36.0* 24.8         Results from last 7 days   Lab Units 11/17/23  1721   MAGNESIUM mg/dL 1.1*           EKG (reviewed by me personally):      Assessment:   1.  Left lower lobe pneumonia  2.  Sepsis with septic shock  3.  COPD with acute exacerbation  4.  Acute hypoxic respiratory failure  5.  Coronary artery disease, status post 2 separate CABG surgeries  6.  Paroxysmal atrial fibrillation  7.  Elevated troponin, likely type II NSTEMI secondary to #1 - #4.  8.  Ischemic cardiomyopathy, EF 20 to 25% at [place] on 5/4/2021  9.  Significantly elevated D-dimer with  no PE on CTA    Plan:       After I initially saw the patient, his CT angiogram had not been performed.  I placed him on heparin at that time, although he did not have evidence of a pulmonary embolism.  He does have a history of paroxysmal atrial fibrillation entheses although he is in sinus rhythm currently).  I as long as there is no contraindication, I will continue him on heparin for now.  This could potentially be switched to Lovenox tomorrow if he does not have any procedures pending.  He is being treated with IV steroids, antibiotics, and nebulized breathing treatments.    He is not having any chest pain.  Again, [facility] although [facility] have been requested.  He has had 2 separate CABG surgeries.  I really feel that the elevated troponin is a type II NSTEMI secondary to the pneumonia, sepsis, COPD, and hypoxic respiratory failure.  He will also continue on aspirin, metoprolol, and Lipitor.    I did order an echocardiogram, although his last echo at [facility] showed an EF of 20 to 25%.  He does not appear to clinically be in congestive heart failure.  He is on Entresto which has been resumed now that his hypotension has resolved.    Cardiology will continue to follow.  Thank you very much for this consult.    Darion Maravilla MD

## 2023-11-17 NOTE — ED NOTES
Pt to ED via EMS after resp. Distress from home that started a few hours ago. Per EMS, pt was in the low 80's RA, then started on CPAP and was sitting around 92%. Pt had 2 nitro sublingual en route. Hx of COPD, no recent illness.

## 2023-11-17 NOTE — PLAN OF CARE
Goal Outcome Evaluation:  Plan of Care Reviewed With: patient           Outcome Evaluation: Swallow eval completed. Bipap removed for eval and sats remained >90. Pt had inconsistent slight throat clear/cough as Bipap was removed and inconsistently throughout the eval. No overt s/s directly after trials including thin, pureed, soft, mixed, and regular. Mastication was functional. Laryngeal elevation was adequate with palpation. MD concerned for aspiration. Recommend continue on regular and thin; meds as tolerated; urpight for meals and 30 min after; slow rate; small bites/sips. ST to schedule VFSS Sat/Mon to r/o aspiration.      Anticipated Discharge Disposition (SLP): unknown

## 2023-11-17 NOTE — PROGRESS NOTES
[facility]     Mar/chart reviewed  Follow up hypotension, AHRF,   Feeling better this morning  On much less oxygen, no chest pain  Says he has ae of his copd everytime he gets off steroids    Vital Sign Min/Max for last 24 hours  Temp  Min: 99.7 °F (37.6 °C)  Max: 99.7 °F (37.6 °C)   BP  Min: 84/49  Max: 141/72   Pulse  Min: 70  Max: 126   Resp  Min: 22  Max: 26   SpO2  Min: 85 %  Max: 100 %   Flow (L/min)  Min: 1  Max: 1   Weight  Min: 72.6 kg (160 lb)  Max: 72.6 kg (160 lb)   3400/out?  Appears ill, axox3,   perrl, eomi, normal sclera  mmm, no jvd, trachea midline, neck supple,  chest decreased, coarse ae bilaterally, no crackles, + wheezes,   Irrg, rate ok  soft, nt, nd +bs,  no c/c/ e  Skin warm, dry no rashes    Labs: 11/17: reviewed;  Lactate 1.8  Glucose 113  Bun 19  Cr 1.16  Na 135  Bicarb 24  Probnp 1465  D dimer 6.4  11/16  Wbc 22.9 (92% neutrophils)  Hgb 15.4  Plts 144  7.34/47/135  Rpp neg    Ct angio: reviewed no pe to my read, extensive emphysema and some mild left sided infiltrate, official read is pending still    A/P:  Severe sepsis with shock --  resolved, will decrease iv fluids, continue antibitoics  COPD with ae (with frequent ae) -- continue steroids and bronchodilators, appears quite severe on ct imaging, trial off nippv  Acute hypercapnic and hypoxemic respiratory failure - wean oxygen as able, outpatient pft  Pneumonia - continue antibitiotics  CAD with h/o CABG  Afib -- on heparin, cards to see   Patient is new to me today  Can downgrade to telemetry unit    He seems to be having frequent acute exacerbations, will need to see what can be done to reduce these  Needs better pulm toilet

## 2023-11-18 ENCOUNTER — APPOINTMENT (OUTPATIENT)
Dept: CARDIOLOGY | Facility: HOSPITAL | Age: 78
DRG: 871 | End: 2023-11-18
Payer: MEDICARE

## 2023-11-18 LAB
AORTIC DIMENSIONLESS INDEX: 0.7 (DI)
APTT PPP: 162.8 SECONDS (ref 22.7–35.4)
APTT PPP: 74.1 SECONDS (ref 22.7–35.4)
APTT PPP: 74.8 SECONDS (ref 22.7–35.4)
BASOPHILS # BLD AUTO: 0.02 10*3/MM3 (ref 0–0.2)
BASOPHILS # BLD AUTO: 0.02 10*3/MM3 (ref 0–0.2)
BASOPHILS NFR BLD AUTO: 0.1 % (ref 0–1.5)
BASOPHILS NFR BLD AUTO: 0.1 % (ref 0–1.5)
BH CV ECHO MEAS - ACS: 2.33 CM
BH CV ECHO MEAS - AO MAX PG: 5.8 MMHG
BH CV ECHO MEAS - AO MEAN PG: 3 MMHG
BH CV ECHO MEAS - AO ROOT DIAM: 3.2 CM
BH CV ECHO MEAS - AO V2 MAX: 120 CM/SEC
BH CV ECHO MEAS - AO V2 VTI: 28.3 CM
BH CV ECHO MEAS - AVA(I,D): 2.8 CM2
BH CV ECHO MEAS - EDV(CUBED): 185.2 ML
BH CV ECHO MEAS - EDV(MOD-SP2): 155 ML
BH CV ECHO MEAS - EDV(MOD-SP4): 163 ML
BH CV ECHO MEAS - EF(MOD-BP): 57 %
BH CV ECHO MEAS - EF(MOD-SP2): 58.7 %
BH CV ECHO MEAS - EF(MOD-SP4): 55.8 %
BH CV ECHO MEAS - ESV(CUBED): 71 ML
BH CV ECHO MEAS - ESV(MOD-SP2): 64 ML
BH CV ECHO MEAS - ESV(MOD-SP4): 72 ML
BH CV ECHO MEAS - FS: 27.3 %
BH CV ECHO MEAS - IVS/LVPW: 1 CM
BH CV ECHO MEAS - IVSD: 1 CM
BH CV ECHO MEAS - LAT PEAK E' VEL: 8.7 CM/SEC
BH CV ECHO MEAS - LV MASS(C)D: 226.4 GRAMS
BH CV ECHO MEAS - LV MAX PG: 2.6 MMHG
BH CV ECHO MEAS - LV MEAN PG: 1 MMHG
BH CV ECHO MEAS - LV V1 MAX: 81 CM/SEC
BH CV ECHO MEAS - LV V1 VTI: 20.5 CM
BH CV ECHO MEAS - LVIDD: 5.7 CM
BH CV ECHO MEAS - LVIDS: 4.1 CM
BH CV ECHO MEAS - LVOT AREA: 3.9 CM2
BH CV ECHO MEAS - LVOT DIAM: 2.22 CM
BH CV ECHO MEAS - LVPWD: 1 CM
BH CV ECHO MEAS - MED PEAK E' VEL: 5 CM/SEC
BH CV ECHO MEAS - MR MAX PG: 99.8 MMHG
BH CV ECHO MEAS - MR MAX VEL: 499.5 CM/SEC
BH CV ECHO MEAS - MV A DUR: 0.13 SEC
BH CV ECHO MEAS - MV A MAX VEL: 111 CM/SEC
BH CV ECHO MEAS - MV DEC SLOPE: 256.5 CM/SEC2
BH CV ECHO MEAS - MV DEC TIME: 0.2 SEC
BH CV ECHO MEAS - MV E MAX VEL: 67.4 CM/SEC
BH CV ECHO MEAS - MV E/A: 0.61
BH CV ECHO MEAS - MV MAX PG: 4 MMHG
BH CV ECHO MEAS - MV MEAN PG: 1.48 MMHG
BH CV ECHO MEAS - MV P1/2T: 89.3 MSEC
BH CV ECHO MEAS - MV V2 VTI: 29.9 CM
BH CV ECHO MEAS - MVA(P1/2T): 2.46 CM2
BH CV ECHO MEAS - MVA(VTI): 2.7 CM2
BH CV ECHO MEAS - PA ACC TIME: 0.09 SEC
BH CV ECHO MEAS - PA V2 MAX: 100.4 CM/SEC
BH CV ECHO MEAS - RAP SYSTOLE: 8 MMHG
BH CV ECHO MEAS - RV MAX PG: 2.12 MMHG
BH CV ECHO MEAS - RV V1 MAX: 72.8 CM/SEC
BH CV ECHO MEAS - RV V1 VTI: 17.7 CM
BH CV ECHO MEAS - RVSP: 29 MMHG
BH CV ECHO MEAS - SV(LVOT): 79.3 ML
BH CV ECHO MEAS - SV(MOD-SP2): 91 ML
BH CV ECHO MEAS - SV(MOD-SP4): 91 ML
BH CV ECHO MEAS - TAPSE (>1.6): 1.22 CM
BH CV ECHO MEAS - TR MAX PG: 20.6 MMHG
BH CV ECHO MEAS - TR MAX VEL: 227 CM/SEC
BH CV ECHO MEASUREMENTS AVERAGE E/E' RATIO: 9.84
BH CV XLRA - RV BASE: 2.9 CM
BH CV XLRA - RV LENGTH: 6.9 CM
BH CV XLRA - RV MID: 1.28 CM
BH CV XLRA - TDI S': 8.9 CM/SEC
DEPRECATED RDW RBC AUTO: 41.2 FL (ref 37–54)
DEPRECATED RDW RBC AUTO: 41.2 FL (ref 37–54)
EOSINOPHIL # BLD AUTO: 0 10*3/MM3 (ref 0–0.4)
EOSINOPHIL # BLD AUTO: 0 10*3/MM3 (ref 0–0.4)
EOSINOPHIL NFR BLD AUTO: 0 % (ref 0.3–6.2)
EOSINOPHIL NFR BLD AUTO: 0 % (ref 0.3–6.2)
ERYTHROCYTE [DISTWIDTH] IN BLOOD BY AUTOMATED COUNT: 12.4 % (ref 12.3–15.4)
ERYTHROCYTE [DISTWIDTH] IN BLOOD BY AUTOMATED COUNT: 12.4 % (ref 12.3–15.4)
GLUCOSE BLDC GLUCOMTR-MCNC: 126 MG/DL (ref 70–130)
GLUCOSE BLDC GLUCOMTR-MCNC: 129 MG/DL (ref 70–130)
GLUCOSE BLDC GLUCOMTR-MCNC: 152 MG/DL (ref 70–130)
GLUCOSE BLDC GLUCOMTR-MCNC: 162 MG/DL (ref 70–130)
HCT VFR BLD AUTO: 34.4 % (ref 37.5–51)
HCT VFR BLD AUTO: 35.8 % (ref 37.5–51)
HGB BLD-MCNC: 11.5 G/DL (ref 13–17.7)
HGB BLD-MCNC: 11.9 G/DL (ref 13–17.7)
IMM GRANULOCYTES # BLD AUTO: 0.08 10*3/MM3 (ref 0–0.05)
IMM GRANULOCYTES # BLD AUTO: 0.12 10*3/MM3 (ref 0–0.05)
IMM GRANULOCYTES NFR BLD AUTO: 0.6 % (ref 0–0.5)
IMM GRANULOCYTES NFR BLD AUTO: 0.9 % (ref 0–0.5)
LEFT ATRIUM VOLUME INDEX: 31.8 ML/M2
LYMPHOCYTES # BLD AUTO: 0.42 10*3/MM3 (ref 0.7–3.1)
LYMPHOCYTES # BLD AUTO: 0.47 10*3/MM3 (ref 0.7–3.1)
LYMPHOCYTES NFR BLD AUTO: 3 % (ref 19.6–45.3)
LYMPHOCYTES NFR BLD AUTO: 3.4 % (ref 19.6–45.3)
MAGNESIUM SERPL-MCNC: 3.2 MG/DL (ref 1.6–2.4)
MCH RBC QN AUTO: 30.3 PG (ref 26.6–33)
MCH RBC QN AUTO: 30.3 PG (ref 26.6–33)
MCHC RBC AUTO-ENTMCNC: 33.2 G/DL (ref 31.5–35.7)
MCHC RBC AUTO-ENTMCNC: 33.4 G/DL (ref 31.5–35.7)
MCV RBC AUTO: 90.8 FL (ref 79–97)
MCV RBC AUTO: 91.1 FL (ref 79–97)
MONOCYTES # BLD AUTO: 0.3 10*3/MM3 (ref 0.1–0.9)
MONOCYTES # BLD AUTO: 0.31 10*3/MM3 (ref 0.1–0.9)
MONOCYTES NFR BLD AUTO: 2.2 % (ref 5–12)
MONOCYTES NFR BLD AUTO: 2.2 % (ref 5–12)
NEUTROPHILS NFR BLD AUTO: 12.97 10*3/MM3 (ref 1.7–7)
NEUTROPHILS NFR BLD AUTO: 12.98 10*3/MM3 (ref 1.7–7)
NEUTROPHILS NFR BLD AUTO: 93.4 % (ref 42.7–76)
NEUTROPHILS NFR BLD AUTO: 94.1 % (ref 42.7–76)
NRBC BLD AUTO-RTO: 0 /100 WBC (ref 0–0.2)
NRBC BLD AUTO-RTO: 0 /100 WBC (ref 0–0.2)
PLATELET # BLD AUTO: 92 10*3/MM3 (ref 140–450)
PLATELET # BLD AUTO: 98 10*3/MM3 (ref 140–450)
PMV BLD AUTO: 9.1 FL (ref 6–12)
PMV BLD AUTO: 9.5 FL (ref 6–12)
POTASSIUM SERPL-SCNC: 4.9 MMOL/L (ref 3.5–5.2)
QT INTERVAL: 397 MS
QTC INTERVAL: 475 MS
RBC # BLD AUTO: 3.79 10*6/MM3 (ref 4.14–5.8)
RBC # BLD AUTO: 3.93 10*6/MM3 (ref 4.14–5.8)
SINUS: 3.2 CM
STJ: 2.9 CM
WBC NRBC COR # BLD AUTO: 13.81 10*3/MM3 (ref 3.4–10.8)
WBC NRBC COR # BLD AUTO: 13.88 10*3/MM3 (ref 3.4–10.8)

## 2023-11-18 PROCEDURE — 25810000003 SODIUM CHLORIDE 0.9 % SOLUTION: Performed by: INTERNAL MEDICINE

## 2023-11-18 PROCEDURE — 94761 N-INVAS EAR/PLS OXIMETRY MLT: CPT

## 2023-11-18 PROCEDURE — 85025 COMPLETE CBC W/AUTO DIFF WBC: CPT | Performed by: INTERNAL MEDICINE

## 2023-11-18 PROCEDURE — 36415 COLL VENOUS BLD VENIPUNCTURE: CPT | Performed by: INTERNAL MEDICINE

## 2023-11-18 PROCEDURE — 25010000002 METHYLPREDNISOLONE PER 40 MG: Performed by: INTERNAL MEDICINE

## 2023-11-18 PROCEDURE — 94664 DEMO&/EVAL PT USE INHALER: CPT

## 2023-11-18 PROCEDURE — 94799 UNLISTED PULMONARY SVC/PX: CPT

## 2023-11-18 PROCEDURE — 93306 TTE W/DOPPLER COMPLETE: CPT

## 2023-11-18 PROCEDURE — 85730 THROMBOPLASTIN TIME PARTIAL: CPT | Performed by: INTERNAL MEDICINE

## 2023-11-18 PROCEDURE — 25510000001 PERFLUTREN (DEFINITY) 8.476 MG IN SODIUM CHLORIDE (PF) 0.9 % 10 ML INJECTION: Performed by: INTERNAL MEDICINE

## 2023-11-18 PROCEDURE — 63710000001 INSULIN LISPRO (HUMAN) PER 5 UNITS: Performed by: INTERNAL MEDICINE

## 2023-11-18 PROCEDURE — 25010000002 ENOXAPARIN PER 10 MG: Performed by: NURSE PRACTITIONER

## 2023-11-18 PROCEDURE — 93306 TTE W/DOPPLER COMPLETE: CPT | Performed by: INTERNAL MEDICINE

## 2023-11-18 PROCEDURE — 82948 REAGENT STRIP/BLOOD GLUCOSE: CPT

## 2023-11-18 PROCEDURE — 25010000002 HEPARIN (PORCINE) PER 1000 UNITS: Performed by: INTERNAL MEDICINE

## 2023-11-18 PROCEDURE — 94660 CPAP INITIATION&MGMT: CPT

## 2023-11-18 PROCEDURE — 84132 ASSAY OF SERUM POTASSIUM: CPT | Performed by: FAMILY MEDICINE

## 2023-11-18 PROCEDURE — 63710000001 PREDNISONE PER 1 MG: Performed by: INTERNAL MEDICINE

## 2023-11-18 PROCEDURE — 25010000002 PIPERACILLIN SOD-TAZOBACTAM PER 1 G: Performed by: INTERNAL MEDICINE

## 2023-11-18 PROCEDURE — 99232 SBSQ HOSP IP/OBS MODERATE 35: CPT | Performed by: NURSE PRACTITIONER

## 2023-11-18 RX ORDER — PREDNISONE 20 MG/1
20 TABLET ORAL
Status: DISCONTINUED | OUTPATIENT
Start: 2023-11-18 | End: 2023-11-19

## 2023-11-18 RX ORDER — ENOXAPARIN SODIUM 100 MG/ML
1 INJECTION SUBCUTANEOUS EVERY 12 HOURS
Status: DISCONTINUED | OUTPATIENT
Start: 2023-11-18 | End: 2023-11-20 | Stop reason: HOSPADM

## 2023-11-18 RX ORDER — SODIUM CHLORIDE FOR INHALATION 7 %
4 VIAL, NEBULIZER (ML) INHALATION
Status: DISCONTINUED | OUTPATIENT
Start: 2023-11-18 | End: 2023-11-19

## 2023-11-18 RX ADMIN — IPRATROPIUM BROMIDE AND ALBUTEROL SULFATE 3 ML: 2.5; .5 SOLUTION RESPIRATORY (INHALATION) at 21:57

## 2023-11-18 RX ADMIN — HEPARIN SODIUM 12 UNITS/KG/HR: 10000 INJECTION, SOLUTION INTRAVENOUS at 10:23

## 2023-11-18 RX ADMIN — PIPERACILLIN SODIUM AND TAZOBACTAM SODIUM 3.38 G: 3; .375 INJECTION, SOLUTION INTRAVENOUS at 00:04

## 2023-11-18 RX ADMIN — IPRATROPIUM BROMIDE AND ALBUTEROL SULFATE 3 ML: 2.5; .5 SOLUTION RESPIRATORY (INHALATION) at 07:46

## 2023-11-18 RX ADMIN — PREDNISONE 20 MG: 20 TABLET ORAL at 17:35

## 2023-11-18 RX ADMIN — Medication 4 ML: at 07:50

## 2023-11-18 RX ADMIN — GUAIFENESIN 1200 MG: 600 TABLET, EXTENDED RELEASE ORAL at 20:18

## 2023-11-18 RX ADMIN — SACUBITRIL AND VALSARTAN 1 TABLET: 24; 26 TABLET, FILM COATED ORAL at 20:18

## 2023-11-18 RX ADMIN — PIPERACILLIN SODIUM AND TAZOBACTAM SODIUM 3.38 G: 3; .375 INJECTION, SOLUTION INTRAVENOUS at 17:35

## 2023-11-18 RX ADMIN — SODIUM CHLORIDE 50 ML/HR: 9 INJECTION, SOLUTION INTRAVENOUS at 10:24

## 2023-11-18 RX ADMIN — SACUBITRIL AND VALSARTAN 1 TABLET: 24; 26 TABLET, FILM COATED ORAL at 10:16

## 2023-11-18 RX ADMIN — FLUTICASONE PROPIONATE 2 SPRAY: 50 SPRAY, METERED NASAL at 20:17

## 2023-11-18 RX ADMIN — POTASSIUM CHLORIDE 40 MEQ: 750 TABLET, EXTENDED RELEASE ORAL at 04:11

## 2023-11-18 RX ADMIN — METHYLPREDNISOLONE SODIUM SUCCINATE 40 MG: 40 INJECTION INTRAMUSCULAR; INTRAVENOUS at 10:16

## 2023-11-18 RX ADMIN — ASPIRIN 81 MG: 81 TABLET, COATED ORAL at 10:16

## 2023-11-18 RX ADMIN — Medication 4 ML: at 21:57

## 2023-11-18 RX ADMIN — PIPERACILLIN SODIUM AND TAZOBACTAM SODIUM 3.38 G: 3; .375 INJECTION, SOLUTION INTRAVENOUS at 10:15

## 2023-11-18 RX ADMIN — IPRATROPIUM BROMIDE AND ALBUTEROL SULFATE 3 ML: 2.5; .5 SOLUTION RESPIRATORY (INHALATION) at 15:43

## 2023-11-18 RX ADMIN — PERFLUTREN 2 ML: 6.52 INJECTION, SUSPENSION INTRAVENOUS at 10:39

## 2023-11-18 RX ADMIN — Medication 4 ML: at 15:47

## 2023-11-18 RX ADMIN — INSULIN LISPRO 2 UNITS: 100 INJECTION, SOLUTION INTRAVENOUS; SUBCUTANEOUS at 21:08

## 2023-11-18 RX ADMIN — FAMOTIDINE 20 MG: 20 TABLET ORAL at 10:16

## 2023-11-18 RX ADMIN — FAMOTIDINE 20 MG: 20 TABLET ORAL at 21:13

## 2023-11-18 RX ADMIN — ATORVASTATIN CALCIUM 80 MG: 80 TABLET, FILM COATED ORAL at 20:18

## 2023-11-18 RX ADMIN — ENOXAPARIN SODIUM 70 MG: 100 INJECTION SUBCUTANEOUS at 12:57

## 2023-11-18 RX ADMIN — METOPROLOL TARTRATE 75 MG: 25 TABLET, FILM COATED ORAL at 20:17

## 2023-11-18 RX ADMIN — METHYLPREDNISOLONE SODIUM SUCCINATE 40 MG: 40 INJECTION INTRAMUSCULAR; INTRAVENOUS at 00:04

## 2023-11-18 RX ADMIN — GUAIFENESIN 1200 MG: 600 TABLET, EXTENDED RELEASE ORAL at 10:16

## 2023-11-18 RX ADMIN — IPRATROPIUM BROMIDE AND ALBUTEROL SULFATE 3 ML: 2.5; .5 SOLUTION RESPIRATORY (INHALATION) at 03:06

## 2023-11-18 RX ADMIN — IPRATROPIUM BROMIDE AND ALBUTEROL SULFATE 3 ML: 2.5; .5 SOLUTION RESPIRATORY (INHALATION) at 18:55

## 2023-11-18 RX ADMIN — Medication 4 ML: at 18:55

## 2023-11-18 RX ADMIN — IPRATROPIUM BROMIDE AND ALBUTEROL SULFATE 3 ML: 2.5; .5 SOLUTION RESPIRATORY (INHALATION) at 12:19

## 2023-11-18 RX ADMIN — TIOTROPIUM BROMIDE INHALATION SPRAY 2 PUFF: 3.12 SPRAY, METERED RESPIRATORY (INHALATION) at 08:04

## 2023-11-18 RX ADMIN — INSULIN LISPRO 2 UNITS: 100 INJECTION, SOLUTION INTRAVENOUS; SUBCUTANEOUS at 12:55

## 2023-11-18 RX ADMIN — METOPROLOL TARTRATE 75 MG: 25 TABLET, FILM COATED ORAL at 10:16

## 2023-11-18 NOTE — PROGRESS NOTES
Hospital Follow Up    LOS:  LOS: 1 day   Patient Name: Wisam Malone  Age/Sex: 77 y.o. male  : 1945  MRN: [id number]    Day of Service: 23   Length of Stay: 1  Encounter Provider: ZAIDA Colón  Place of Service: [facility] CARDIOLOGY  Patient Care Team:  System, Provider Not In as PCP - General    Subjective:     Chief Complaint: SOA    Interval History: Still SOA but a little improved. No chest pain.    Objective:     Objective:  Temp:  [97.2 °F (36.2 °C)-97.9 °F (36.6 °C)] 97.2 °F (36.2 °C)  Heart Rate:  [59-89] 67  Resp:  [18-26] 18  BP: ()/(55-88) 118/65     Intake/Output Summary (Last 24 hours) at 2023 1137  Last data filed at 2023 0737  Gross per 24 hour   Intake 240 ml   Output 300 ml   Net -60 ml     Body mass index is 22.11 kg/m².      23  2230 23  0540 23  1038   Weight: 72.6 kg (160 lb) 74 kg (163 lb 2.3 oz) 73.9 kg (163 lb)     Weight change: 1.425 kg (3 lb 2.3 oz)    Physical Exam:   General Appearance:    Awake alert and oriented in no acute distress.   Color:  Skin:  Neuro:  HEENT:    Lungs:     Pink  Warm and dry  No focal, motor or sensory deficits  Neck supple, pupils equal, round and reactive. No JVD, No Bruit  Faint wheezing, respirations regular, even and                  unlabored    Heart:    Regular rate and rhythm, S1 and S2, no murmur, no gallop, no rub. No edema, DP/PT pulses are 2+   Chest Wall:    No abnormalities observed   Abdomen:     Normal bowel sounds, no masses, no organomegaly, soft        non-tender, non-distended, no guarding, no ascites noted   Extremities:   Moves all extremities well, no edema, no cyanosis, no redness       Lab Review:   Results from last 7 days   Lab Units 23  0625 23  1721 23  2355   SODIUM mmol/L  --   --  135*   POTASSIUM mmol/L 4.9 2.7* 4.9   CHLORIDE mmol/L  --   --  101   CO2 mmol/L  --   --  24.0   BUN mg/dL  --   --  19   CREATININE mg/dL  --  "  --  1.16   GLUCOSE mg/dL  --   --  113*   CALCIUM mg/dL  --   --  8.7   AST (SGOT) U/L  --   --  23   ALT (SGPT) U/L  --   --  43*     Results from last 7 days   Lab Units 11/17/23  0206 11/16/23  2355   HSTROP T ng/L 41* 28*     Results from last 7 days   Lab Units 11/18/23  0625 11/18/23  0447   WBC 10*3/mm3 13.88* 13.81*   HEMOGLOBIN g/dL 11.9* 11.5*   HEMATOCRIT % 35.8* 34.4*   PLATELETS 10*3/mm3 98* 92*     Results from last 7 days   Lab Units 11/18/23  1011 11/18/23  0447 11/17/23  0856 11/16/23  2355   INR   --   --   --  0.93   APTT seconds 74.8* 74.1*   < > 24.8    < > = values in this interval not displayed.     Results from last 7 days   Lab Units 11/17/23  2348 11/17/23  1721   MAGNESIUM mg/dL 3.2* 1.1*           Invalid input(s): \"LDLCALC\"  Results from last 7 days   Lab Units 11/16/23  2355   PROBNP pg/mL 1,465.0         I reviewed the patient's new clinical results.  I personally viewed and interpreted the patient's EKG  Current Medications:   Scheduled Meds:aspirin, 81 mg, Oral, Daily  atorvastatin, 80 mg, Oral, Nightly  famotidine, 20 mg, Oral, BID  fluticasone, 2 spray, Nasal, Daily  guaiFENesin, 1,200 mg, Oral, BID  insulin lispro, 2-7 Units, Subcutaneous, 4x Daily AC & at Bedtime  ipratropium-albuterol, 3 mL, Nebulization, Q4H - RT  metoprolol tartrate, 75 mg, Oral, BID  piperacillin-tazobactam, 3.375 g, Intravenous, Q8H  polyethylene glycol, 17 g, Oral, Daily  predniSONE, 20 mg, Oral, TID With Meals  sacubitril-valsartan, 1 tablet, Oral, BID  senna-docusate sodium, 2 tablet, Oral, BID  sodium chloride, 4 mL, Nebulization, BID - RT  sodium chloride, 4 mL, Nebulization, TID - RT  tiotropium bromide monohydrate, 2 puff, Inhalation, Daily - RT      Continuous Infusions:heparin, 18 Units/kg/hr, Last Rate: 12 Units/kg/hr (11/18/23 1023)        Allergies:  No Known Allergies    Assessment:       Septic shock        Plan:   1.  Left lower lobe pneumonia  2.  Sepsis with septic shock  3.  COPD with " acute exacerbation  4.  Acute hypoxic respiratory failure  5.  Coronary artery disease, status post 2 separate CABG surgeries  6.  Paroxysmal atrial fibrillation  7.  Elevated troponin, likely type II NSTEMI secondary to #1 - #4.  8.  Ischemic cardiomyopathy, EF 20 to 25% at [place] on 5/4/2021  9.  Significantly elevated D-dimer with no PE on CTA      -Reports breathing a little better this morning. No chest pain  -[facility].  -stop heparin and start lovenox  ZAIDA Colón  11/18/23  11:37 EST  Electronically signed by ZAIDA Colón, 11/18/23, 11:37 AM EST.

## 2023-11-18 NOTE — PLAN OF CARE
Goal Outcome Evaluation:  Plan of Care Reviewed With: patient        Progress: improving  Outcome Evaluation: Continued treatment for septic shock. Vitals stable. Remains on room air. Needed 2L for a short time after becoming SOB with a walk to the bathroom. Only reports SOB with activity. Heparin drip discontinued. Transitioned to subcutaneous lovenox. IV steroids switched to PO prednisone. IV antibiotics continued. Echo done today which shows EF 36-40%. Family visited and updated on plan of care with patient's permission. No further issues at this time.

## 2023-11-18 NOTE — PROGRESS NOTES
PROGRESS NOTE  Patient Name: Wisam Malone  Age/Sex: 77 y.o. male  : 1945  MRN: [id number]    Date of Admission: 2023  Date of Encounter Visit: 23   LOS: 1 day   Patient Care Team:  System, Provider Not In as PCP - General    Chief Complaint:Respiratory failure    Hospital course: Patient has advanced COPD with significant emphysema based on his CAT scan.  He did have some scattered bronchopneumonia however only a small percentage of the lung tissue are involved over the bases.  He has been having recurrent attacks, had 8 ER visits since October that this is his first admission over the last year  He has multiple ER visits where he gets better with IV steroids  He has a BiPAP at home and he used the noninvasive positive pressure ventilation last night, he is doing fine during the daytime even on room air.  Afebrile  Feeling better  Reporting difficulty expectorating      REVIEW OF SYSTEMS:   CONSTITUTIONAL: no fever or chills  CARDIOVASCULAR: No chest pain, chest pressure or chest discomfort. No palpitations or edema.   RESPIRATORY: Improved shortness of breath, problem with expectoration  GASTROINTESTINAL: No anorexia, nausea, vomiting or diarrhea. No abdominal pain or blood.   HEMATOLOGIC: No bleeding or bruising.     Ventilator/Non-Invasive Ventilation Settings (From admission, onward)       Start     Ordered    23 1126  NIPPV (CPAP or BIPAP)  As Needed-RT        Question:  Type  Answer:  BIPAP    23 1125    23 2234  NIPPV (CPAP or BIPAP)  Until Discontinued,   Status:  Canceled        Question:  Type  Answer:  BIPAP    23 2233                      Vital Signs  Temp:  [97.2 °F (36.2 °C)-97.9 °F (36.6 °C)] 97.2 °F (36.2 °C)  Heart Rate:  [59-89] 67  Resp:  [18-26] 18  BP: ()/(55-88) 118/65  SpO2:  [85 %-100 %] 100 %  on    Device (Oxygen Therapy): room air    Intake/Output Summary (Last 24 hours) at 2023 1039  Last data filed at 2023 0792  Gross per  "24 hour   Intake 240 ml   Output 300 ml   Net -60 ml     Flowsheet Rows      Flowsheet Row First Filed Value   Admission Height 182.9 cm (72\") Documented at 11/16/2023 2230   Admission Weight 72.6 kg (160 lb) Documented at 11/16/2023 2230          Body mass index is 22.11 kg/m².      11/16/23  2230 11/18/23  0540 11/18/23  1038   Weight: 72.6 kg (160 lb) 74 kg (163 lb 2.3 oz) 73.9 kg (163 lb)       Physical Exam:  GEN: Pleasant, alert, cooperative, well developed, chronically ill but in no acute distress  EYES:   Sclerae clear. No icterus. PERRL. Normal EOM  ENT:   External ears/nose normal, no oral lesions, no thrush, mucous membranes moist  NECK:  Supple, midline trachea, no JVD  LUNGS: Normal chest on inspection, mild expiratory wheezes, positive rhonchi upon coughing.. Respirations regular, even and unlabored.   CV:  Regular rhythm and rate. Normal S1/S2. No murmurs, gallops, or rubs noted.  ABD:  Soft, nontender and nondistended. Normal bowel sounds. No guarding  EXT:  Moves all extremities well. No cyanosis. No redness.  Minimal edema.   Skin: Dry, intact, no bleeding    Results Review:    Results From Last 14 Days   Lab Units 11/17/23  0206 11/16/23  2355   D DIMER QUANT MCGFEU/mL  --  6.47*   LACTATE mmol/L 1.8 2.6*     Results from last 7 days   Lab Units 11/18/23  0625 11/17/23  1721 11/16/23  2355   SODIUM mmol/L  --   --  135*   POTASSIUM mmol/L 4.9 2.7* 4.9   CHLORIDE mmol/L  --   --  101   CO2 mmol/L  --   --  24.0   BUN mg/dL  --   --  19   CREATININE mg/dL  --   --  1.16   CALCIUM mg/dL  --   --  8.7   AST (SGOT) U/L  --   --  23   ALT (SGPT) U/L  --   --  43*   ANION GAP mmol/L  --   --  10.0   ALBUMIN g/dL  --   --  3.9     Results from last 7 days   Lab Units 11/17/23  0206 11/16/23  2355   HSTROP T ng/L 41* 28*   D DIMER QUANT MCGFEU/mL  --  6.47*         Results from last 7 days   Lab Units 11/16/23  2355   PROBNP pg/mL 1,465.0     Results from last 7 days   Lab Units 11/18/23  0625 " "11/18/23 0447 11/16/23 2355   WBC 10*3/mm3 13.88* 13.81* 22.91*   HEMOGLOBIN g/dL 11.9* 11.5* 15.4   HEMATOCRIT % 35.8* 34.4* 45.7   PLATELETS 10*3/mm3 98* 92* 144   MCV fL 91.1 90.8 91.0   NEUTROPHIL % % 93.4* 94.1* 92.3*   LYMPHOCYTE % % 3.4* 3.0* 4.4*   MONOCYTES % % 2.2* 2.2* 2.2*   EOSINOPHIL % % 0.0* 0.0* 0.2*   BASOPHIL % % 0.1 0.1 0.2   IMM GRAN % % 0.9* 0.6* 0.7*     Results from last 7 days   Lab Units 11/18/23 0447 11/17/23 2348 11/17/23  1526 11/17/23  0856 11/16/23 2355   INR   --   --   --   --  0.93   APTT seconds 74.1* 162.8* 167.8* 36.0* 24.8     Results from last 7 days   Lab Units 11/17/23  2348 11/17/23  1721 11/16/23  2355   MAGNESIUM mg/dL 3.2* 1.1* 1.9           Invalid input(s): \"LDLCALC\"  Results from last 7 days   Lab Units 11/16/23  2258   PH, ARTERIAL pH units 7.344*   PCO2, ARTERIAL mm Hg 47.6*   PO2 ART mm Hg 135.2*   HCO3 ART mmol/L 25.9         Glucose   Date/Time Value Ref Range Status   11/18/2023 0815 126 70 - 130 mg/dL Final   11/17/2023 2011 154 (H) 70 - 130 mg/dL Final   11/17/2023 1537 168 (H) 70 - 130 mg/dL Final   11/17/2023 0853 108 70 - 130 mg/dL Final   11/17/2023 0421 125 70 - 130 mg/dL Final     Results from last 7 days   Lab Units 11/17/23  0206 11/16/23  2355   PROCALCITONIN ng/mL 1.55*  --    LACTATE mmol/L 1.8 2.6*     Results from last 7 days   Lab Units 11/16/23  2355 11/16/23  2354   BLOODCX  No growth at 24 hours No growth at 24 hours         Results from last 7 days   Lab Units 11/16/23  2237   COVID19  Not Detected   ADENOVIRUS DETECTION BY PCR  Not Detected   CORONAVIRUS 229E  Not Detected   CORONAVIRUS HKU1  Not Detected   CORONAVIRUS NL63  Not Detected   CORONAVIRUS OC43  Not Detected   HUMAN METAPNEUMOVIRUS  Not Detected   HUMAN RHINOVIRUS/ENTEROVIRUS  Not Detected   INFLUENZA B PCR  Not Detected   PARAINFLUENZA 1  Not Detected   PARAINFLUENZA VIRUS 2  Not Detected   PARAINFLUENZA VIRUS 3  Not Detected   PARAINFLUENZA VIRUS 4  Not Detected   BORDETELLA " PERTUSSIS PCR  Not Detected   BORDETELLA PARAPERTUSSIS PCR  Not Detected   CHLAMYDOPHILA PNEUMONIAE PCR  Not Detected   MYCOPLAMA PNEUMO PCR  Not Detected   RSV, PCR  Not Detected               Imaging:   Imaging Results (All)       Procedure Component Value Units Date/Time              I reviewed the patient's new clinical results.  I personally viewed and interpreted the patient's imaging results:        Medication Review:   aspirin, 81 mg, Oral, Daily  atorvastatin, 80 mg, Oral, Nightly  famotidine, 20 mg, Oral, BID  fluticasone, 2 spray, Nasal, Daily  guaiFENesin, 1,200 mg, Oral, BID  insulin lispro, 2-7 Units, Subcutaneous, 4x Daily AC & at Bedtime  ipratropium-albuterol, 3 mL, Nebulization, Q4H - RT  methylPREDNISolone sodium succinate, 40 mg, Intravenous, Q8H  metoprolol tartrate, 75 mg, Oral, BID  perflutren (DEFINITY) 8.476 mg in Sodium Chloride (PF) 0.9 % 10 mL injection, 2 mL, Intravenous, Once in imaging  piperacillin-tazobactam, 3.375 g, Intravenous, Q8H  polyethylene glycol, 17 g, Oral, Daily  sacubitril-valsartan, 1 tablet, Oral, BID  senna-docusate sodium, 2 tablet, Oral, BID  sodium chloride, 4 mL, Nebulization, BID - RT  tiotropium bromide monohydrate, 2 puff, Inhalation, Daily - RT        heparin, 18 Units/kg/hr, Last Rate: 12 Units/kg/hr (11/18/23 1023)  sodium chloride, 50 mL/hr, Last Rate: 50 mL/hr (11/18/23 1024)        ASSESSMENT:   COPD with severe emphysema with recurrent ER visit and bronchitis attack  Chronic hypercapnic respiratory failure, on nocturnal ventilation  Pneumonia  COPD exacerbation  Coronary artery disease  Atrial fibrillation on anticoagulation, currently on heparin drip    PLAN:  Patient is doing definitely better, he is on room air, wheezing is better, still having issues with expectoration  We will transition to p.o. prednisone and taper the dose  Continue with the Zosyn and plan to transition to oral Augmentin by tomorrow  Continue with the current initial therapy we  will add mucolytic regimen to help with expectoration and we will also add a flutter valve  Discussed with patient, records from the last 4 hours were reviewed  Labs/Notes/films were independently reviewed and pertinent results are summarized above  The copied texts in this note were reviewed and they are accurate as of 11/18/23    Disposition: Discharge home soon  Zelda Colbert MD  11/18/23  10:39 EST             Dictated utilizing Dragon dictation

## 2023-11-18 NOTE — PLAN OF CARE
Goal Outcome Evaluation:  Plan of Care Reviewed With: patient        Progress: improving  Outcome Evaluation: Pt  admitted 11/16/2023 for Septic shock. Pt wants use  Bipap all night . Pt sat's greater than 91. Heparin 12 unit/kg /hr continues his next lab 9.17 am . Potassium and magnesium replaced. Denies Shortness of breath.Denies chest pain . No complaints or concerns per pt. Safety maintained.

## 2023-11-19 LAB
GLUCOSE BLDC GLUCOMTR-MCNC: 111 MG/DL (ref 70–130)
GLUCOSE BLDC GLUCOMTR-MCNC: 120 MG/DL (ref 70–130)
GLUCOSE BLDC GLUCOMTR-MCNC: 120 MG/DL (ref 70–130)
GLUCOSE BLDC GLUCOMTR-MCNC: 150 MG/DL (ref 70–130)

## 2023-11-19 PROCEDURE — 97162 PT EVAL MOD COMPLEX 30 MIN: CPT

## 2023-11-19 PROCEDURE — 87070 CULTURE OTHR SPECIMN AEROBIC: CPT | Performed by: INTERNAL MEDICINE

## 2023-11-19 PROCEDURE — 25010000002 ENOXAPARIN PER 10 MG: Performed by: NURSE PRACTITIONER

## 2023-11-19 PROCEDURE — 94799 UNLISTED PULMONARY SVC/PX: CPT

## 2023-11-19 PROCEDURE — 25010000002 PIPERACILLIN SOD-TAZOBACTAM PER 1 G: Performed by: INTERNAL MEDICINE

## 2023-11-19 PROCEDURE — 94761 N-INVAS EAR/PLS OXIMETRY MLT: CPT

## 2023-11-19 PROCEDURE — 87205 SMEAR GRAM STAIN: CPT | Performed by: INTERNAL MEDICINE

## 2023-11-19 PROCEDURE — 94664 DEMO&/EVAL PT USE INHALER: CPT

## 2023-11-19 PROCEDURE — 82948 REAGENT STRIP/BLOOD GLUCOSE: CPT

## 2023-11-19 PROCEDURE — 99232 SBSQ HOSP IP/OBS MODERATE 35: CPT | Performed by: NURSE PRACTITIONER

## 2023-11-19 PROCEDURE — 94760 N-INVAS EAR/PLS OXIMETRY 1: CPT

## 2023-11-19 PROCEDURE — 63710000001 PREDNISONE PER 1 MG: Performed by: INTERNAL MEDICINE

## 2023-11-19 PROCEDURE — 97530 THERAPEUTIC ACTIVITIES: CPT

## 2023-11-19 RX ORDER — AMOXICILLIN AND CLAVULANATE POTASSIUM 875; 125 MG/1; MG/1
1 TABLET, FILM COATED ORAL EVERY 12 HOURS SCHEDULED
Status: DISCONTINUED | OUTPATIENT
Start: 2023-11-19 | End: 2023-11-20 | Stop reason: HOSPADM

## 2023-11-19 RX ORDER — PREDNISONE 20 MG/1
20 TABLET ORAL 2 TIMES DAILY WITH MEALS
Status: DISCONTINUED | OUTPATIENT
Start: 2023-11-20 | End: 2023-11-20 | Stop reason: HOSPADM

## 2023-11-19 RX ADMIN — ATORVASTATIN CALCIUM 80 MG: 80 TABLET, FILM COATED ORAL at 21:40

## 2023-11-19 RX ADMIN — IPRATROPIUM BROMIDE AND ALBUTEROL SULFATE 3 ML: 2.5; .5 SOLUTION RESPIRATORY (INHALATION) at 11:23

## 2023-11-19 RX ADMIN — ENOXAPARIN SODIUM 70 MG: 100 INJECTION SUBCUTANEOUS at 23:18

## 2023-11-19 RX ADMIN — Medication 4 ML: at 07:28

## 2023-11-19 RX ADMIN — IPRATROPIUM BROMIDE AND ALBUTEROL SULFATE 3 ML: 2.5; .5 SOLUTION RESPIRATORY (INHALATION) at 20:20

## 2023-11-19 RX ADMIN — PIPERACILLIN SODIUM AND TAZOBACTAM SODIUM 3.38 G: 3; .375 INJECTION, SOLUTION INTRAVENOUS at 00:49

## 2023-11-19 RX ADMIN — ENOXAPARIN SODIUM 70 MG: 100 INJECTION SUBCUTANEOUS at 12:16

## 2023-11-19 RX ADMIN — SACUBITRIL AND VALSARTAN 1 TABLET: 24; 26 TABLET, FILM COATED ORAL at 08:27

## 2023-11-19 RX ADMIN — METOPROLOL TARTRATE 75 MG: 25 TABLET, FILM COATED ORAL at 08:27

## 2023-11-19 RX ADMIN — PIPERACILLIN SODIUM AND TAZOBACTAM SODIUM 3.38 G: 3; .375 INJECTION, SOLUTION INTRAVENOUS at 08:25

## 2023-11-19 RX ADMIN — FAMOTIDINE 20 MG: 20 TABLET ORAL at 08:32

## 2023-11-19 RX ADMIN — FAMOTIDINE 20 MG: 20 TABLET ORAL at 21:40

## 2023-11-19 RX ADMIN — ENOXAPARIN SODIUM 70 MG: 100 INJECTION SUBCUTANEOUS at 00:50

## 2023-11-19 RX ADMIN — ASPIRIN 81 MG: 81 TABLET, COATED ORAL at 08:27

## 2023-11-19 RX ADMIN — GUAIFENESIN 1200 MG: 600 TABLET, EXTENDED RELEASE ORAL at 21:40

## 2023-11-19 RX ADMIN — Medication 4 ML: at 20:21

## 2023-11-19 RX ADMIN — IPRATROPIUM BROMIDE AND ALBUTEROL SULFATE 3 ML: 2.5; .5 SOLUTION RESPIRATORY (INHALATION) at 07:27

## 2023-11-19 RX ADMIN — GUAIFENESIN 1200 MG: 600 TABLET, EXTENDED RELEASE ORAL at 08:27

## 2023-11-19 RX ADMIN — PREDNISONE 20 MG: 20 TABLET ORAL at 12:16

## 2023-11-19 RX ADMIN — AMOXICILLIN AND CLAVULANATE POTASSIUM 1 TABLET: 875; 125 TABLET, FILM COATED ORAL at 21:40

## 2023-11-19 RX ADMIN — METOPROLOL TARTRATE 75 MG: 25 TABLET, FILM COATED ORAL at 21:40

## 2023-11-19 RX ADMIN — IPRATROPIUM BROMIDE AND ALBUTEROL SULFATE 3 ML: 2.5; .5 SOLUTION RESPIRATORY (INHALATION) at 15:01

## 2023-11-19 RX ADMIN — SACUBITRIL AND VALSARTAN 1 TABLET: 24; 26 TABLET, FILM COATED ORAL at 21:40

## 2023-11-19 RX ADMIN — IPRATROPIUM BROMIDE AND ALBUTEROL SULFATE 3 ML: 2.5; .5 SOLUTION RESPIRATORY (INHALATION) at 23:35

## 2023-11-19 RX ADMIN — Medication 10 ML: at 21:40

## 2023-11-19 RX ADMIN — DOCUSATE SODIUM 50MG AND SENNOSIDES 8.6MG 2 TABLET: 8.6; 5 TABLET, FILM COATED ORAL at 21:40

## 2023-11-19 RX ADMIN — AMOXICILLIN AND CLAVULANATE POTASSIUM 1 TABLET: 875; 125 TABLET, FILM COATED ORAL at 15:36

## 2023-11-19 RX ADMIN — PREDNISONE 20 MG: 20 TABLET ORAL at 08:27

## 2023-11-19 RX ADMIN — TIOTROPIUM BROMIDE INHALATION SPRAY 2 PUFF: 3.12 SPRAY, METERED RESPIRATORY (INHALATION) at 07:29

## 2023-11-19 NOTE — PROGRESS NOTES
Hospital Follow Up    LOS:  LOS: 2 days   Patient Name: Wisam Malone  Age/Sex: 77 y.o. male  : 1945  MRN: [id number]    Day of Service: 23   Length of Stay: 2  Encounter Provider: ZAIDA Colón  Place of Service: [facility] CARDIOLOGY  Patient Care Team:  System, Provider Not In as PCP - General    Subjective:     Chief Complaint: follow up SOA    Interval History: Feeling better today. Less SOA. No complaints of chest pain.    Objective:     Objective:  Temp:  [97.9 °F (36.6 °C)] 97.9 °F (36.6 °C)  Heart Rate:  [66-85] 77  Resp:  [16-28] 22  BP: (113-140)/(53-65) 140/61     Intake/Output Summary (Last 24 hours) at 2023 0958  Last data filed at 2023 0931  Gross per 24 hour   Intake 660 ml   Output 925 ml   Net -265 ml     Body mass index is 22.6 kg/m².      23  0540 23  1038 23  0619   Weight: 74 kg (163 lb 2.3 oz) 73.9 kg (163 lb) 75.6 kg (166 lb 10.7 oz)     Weight change: -0.064 kg (-2.3 oz)    Physical Exam:   General Appearance:    Awake alert and oriented in no acute distress.   Color:  Skin:  Neuro:  HEENT:    Lungs:     Pink  Warm and dry  No focal, motor or sensory deficits  Neck supple, pupils equal, round and reactive. No JVD, No Bruit  Faint wheezing, respirations regular, even and                  unlabored    Heart:    Regular rate and rhythm, S1 and S2, no murmur, no gallop, no rub. No edema, DP/PT pulses are 2+   Chest Wall:    No abnormalities observed   Abdomen:     Normal bowel sounds, no masses, no organomegaly, soft        non-tender, non-distended, no guarding, no ascites noted   Extremities:   Moves all extremities well, no edema, no cyanosis, no redness       Lab Review:   Results from last 7 days   Lab Units 23  0625 23  1721 23  2355   SODIUM mmol/L  --   --  135*   POTASSIUM mmol/L 4.9 2.7* 4.9   CHLORIDE mmol/L  --   --  101   CO2 mmol/L  --   --  24.0   BUN mg/dL  --   --  19  "  CREATININE mg/dL  --   --  1.16   GLUCOSE mg/dL  --   --  113*   CALCIUM mg/dL  --   --  8.7   AST (SGOT) U/L  --   --  23   ALT (SGPT) U/L  --   --  43*     Results from last 7 days   Lab Units 11/17/23  0206 11/16/23  2355   HSTROP T ng/L 41* 28*     Results from last 7 days   Lab Units 11/18/23  0625 11/18/23  0447   WBC 10*3/mm3 13.88* 13.81*   HEMOGLOBIN g/dL 11.9* 11.5*   HEMATOCRIT % 35.8* 34.4*   PLATELETS 10*3/mm3 98* 92*     Results from last 7 days   Lab Units 11/18/23  1011 11/18/23  0447 11/17/23  0856 11/16/23  2355   INR   --   --   --  0.93   APTT seconds 74.8* 74.1*   < > 24.8    < > = values in this interval not displayed.     Results from last 7 days   Lab Units 11/17/23  2348 11/17/23  1721   MAGNESIUM mg/dL 3.2* 1.1*           Invalid input(s): \"LDLCALC\"  Results from last 7 days   Lab Units 11/16/23  2355   PROBNP pg/mL 1,465.0         I reviewed the patient's new clinical results.  I personally viewed and interpreted the patient's EKG  Current Medications:   Scheduled Meds:aspirin, 81 mg, Oral, Daily  atorvastatin, 80 mg, Oral, Nightly  enoxaparin, 1 mg/kg, Subcutaneous, Q12H  famotidine, 20 mg, Oral, BID  fluticasone, 2 spray, Nasal, Daily  guaiFENesin, 1,200 mg, Oral, BID  insulin lispro, 2-7 Units, Subcutaneous, 4x Daily AC & at Bedtime  ipratropium-albuterol, 3 mL, Nebulization, Q4H - RT  metoprolol tartrate, 75 mg, Oral, BID  piperacillin-tazobactam, 3.375 g, Intravenous, Q8H  polyethylene glycol, 17 g, Oral, Daily  predniSONE, 20 mg, Oral, TID With Meals  sacubitril-valsartan, 1 tablet, Oral, BID  senna-docusate sodium, 2 tablet, Oral, BID  sodium chloride, 4 mL, Nebulization, BID - RT  tiotropium bromide monohydrate, 2 puff, Inhalation, Daily - RT      Continuous Infusions:       Allergies:  No Known Allergies    Assessment:       Septic shock        Plan:   1.  Left lower lobe pneumonia  2.  Sepsis with septic shock  3.  COPD with acute exacerbation  4.  Acute hypoxic respiratory " failure  5.  Coronary artery disease, status post 2 separate CABG surgeries  6.  Paroxysmal atrial fibrillation  7.  Elevated troponin, likely type II NSTEMI secondary to #1 - #4.  8.  Ischemic cardiomyopathy, EF 20 to 25% at [place] on 5/4/2021  9.  Significantly elevated D-dimer with no PE on CTA  10. Thrombus in anterior apex: on lovenox        -echocardiogram showed EF 36-40%, small to moderate size layered thrombus in the anterior apex. On Lovenox. Will require oral anticoagulation at discharge.  -still waiting [facility] records [facility]    ZAIDA Colón  11/19/23  09:58 EST  Electronically signed by ZAIDA Colón, 11/18/23, 11:37 AM EST.

## 2023-11-19 NOTE — PLAN OF CARE
Goal Outcome Evaluation:  Plan of Care Reviewed With: patient        Progress: improving  Outcome Evaluation: Vitals stable. Pt maintaining O2 sat on room air. Pt worked w PT. Pt up to bathroom w SBA and walker. Pt started on PO abx. Pt needs met and questions answered. Will continue to monitor.

## 2023-11-19 NOTE — PLAN OF CARE
Goal Outcome Evaluation:  Plan of Care Reviewed With: patient           Outcome Evaluation: Patient is a 76 y/o male admitted to [facility] for septic shock, acute respiratory failure, and COD exacerbation. He currently lives at home with his spouse, son, and son's wife with 3 SHRAVAN. Prior to admission he was indep with all mobility. Today he presented with decreaed tolerance to activity. He was SBA for bed mobiltiy and stood CGA with rwx. He ambualted 80' CGA and rwx. No unsteadiness or LOB. He had min-mod SOA. O2 remained above 90. Patient will benefit from contonued skilled PT addressing limitations in funtional mobiltiy to maximize safety and indep on d/c. HHPT recommended.      Anticipated Discharge Disposition (PT): home with home health

## 2023-11-19 NOTE — THERAPY EVALUATION
Patient Name: Wisam Malone  : 1945    MRN: [id number]                              Today's Date: 2023       Admit Date: 2023    Visit Dx:     ICD-10-CM ICD-9-CM   1. Pneumonia due to infectious organism, unspecified laterality, unspecified part of lung  J18.9 486   2. Acute respiratory failure with hypoxia  J96.01 518.81   3. COPD with acute exacerbation  J44.1 491.21   4. Sepsis, due to unspecified organism, unspecified whether acute organ dysfunction present  A41.9 038.9     995.91   5. Septic shock  A41.9 038.9    R65.21 785.52     995.92     Patient Active Problem List   Diagnosis    Septic shock     History reviewed. No pertinent past medical history.  History reviewed. No pertinent surgical history.   General Information       Row Name 23 [facility]          Physical Therapy Time and Intention    Document Type evaluation  -LW     Mode of Treatment physical therapy  -       Row Name 23 1306          General Information    Patient Profile Reviewed yes  -LW     Prior Level of Function independent:  -LW     Existing Precautions/Restrictions fall  -LW     Barriers to Rehab none identified  -       Row Name 23 1306          Living Environment    People in Home spouse;child(maria m), adult  -       Row Name 23 1306          Home Main Entrance    Number of Stairs, Main Entrance three  -LW     Stair Railings, Main Entrance railings safe and in good condition  -       Row Name 23 1306          Stairs Within Home, Primary    Number of Stairs, Within Home, Primary none  -       Row Name 23 1306          Cognition    Orientation Status (Cognition) oriented x 3  -       Row Name 23 1306          Safety Issues, Functional Mobility    Impairments Affecting Function (Mobility) endurance/activity tolerance  -LW               User Key  (r) = Recorded By, (t) = Taken By, (c) = Cosigned By      Initials Name Provider Type    Aracelis Fernandez PT Physical Therapist                    Mobility       Row Name 11/19/23 1306          Bed Mobility    Bed Mobility supine-sit;sit-supine  -LW     Supine-Sit [place] (Bed Mobility) standby assist  -LW     Sit-Supine [place] (Bed Mobility) standby assist  -LW     Assistive Device (Bed Mobility) head of bed elevated  -LW       Row Name 11/19/23 1306          Sit-Stand Transfer    Sit-Stand Harper (Transfers) contact guard  -LW     Assistive Device (Sit-Stand Transfers) walker, front-wheeled  -LW       Row Name 11/19/23 1306          Gait/Stairs (Locomotion)    [place] Level (Gait) contact guard  -LW     Assistive Device (Gait) walker, front-wheeled  -LW     Distance in Feet (Gait) 80'  -LW     Deviations/Abnormal Patterns (Gait) gait speed decreased  -LW     Bilateral Gait Deviations forward flexed posture  -LW     [place] Level (Stairs) not tested  -LW               User Key  (r) = Recorded By, (t) = Taken By, (c) = Cosigned By      Initials Name Provider Type    LW Aracelis Malloy PT Physical Therapist                   Obj/Interventions       Row Name 11/19/23 1307          Range of Motion Comprehensive    General Range of Motion no range of motion deficits identified  -       Row Name 11/19/23 1307          Strength Comprehensive (MMT)    General Manual Muscle Testing (MMT) Assessment no strength deficits identified  -       Row Name 11/19/23 1307          Balance    Balance Assessment sitting static balance;sitting dynamic balance;standing static balance;standing dynamic balance  -LW     Static Sitting Balance standby assist  -LW     Dynamic Sitting Balance standby assist  -LW     Position, Sitting Balance sitting edge of bed;unsupported  -LW     Static Standing Balance contact guard  -LW     Dynamic Standing Balance contact guard  -LW     Position/Device Used, Standing Balance supported;walker, front-wheeled  -LW     Balance Interventions sitting;standing;sit to stand  -       Row Name 11/19/23 1306           Sensory Assessment (Somatosensory)    Sensory Assessment (Somatosensory) sensation intact  -LW               User Key  (r) = Recorded By, (t) = Taken By, (c) = Cosigned By      Initials Name Provider Type    Aracelis Fernandez PT Physical Therapist                   Goals/Plan       Row Name 11/19/23 1311          Bed Mobility Goal 1 (PT)    Activity/Assistive Device (Bed Mobility Goal 1, PT) bed mobility activities, all  -LW     [place] Level/Cues Needed (Bed Mobility Goal 1, PT) independent  -LW     Time Frame (Bed Mobility Goal 1, PT) 1 week  -LW       Row Name 11/19/23 1311          Transfer Goal 1 (PT)    Activity/Assistive Device (Transfer Goal 1, PT) sit-to-stand/stand-to-sit;bed-to-chair/chair-to-bed  -LW     [place] Level/Cues Needed (Transfer Goal 1, PT) standby assist  -LW     Time Frame (Transfer Goal 1, PT) 1 week  -LW       Row Name 11/19/23 1311          Gait Training Goal 1 (PT)    Activity/Assistive Device (Gait Training Goal 1, PT) gait (walking locomotion);walker, rolling  -LW     [place] Level (Gait Training Goal 1, PT) standby assist  -LW     Distance (Gait Training Goal 1, PT) 200'  -LW     Time Frame (Gait Training Goal 1, PT) 1 week  -LW               User Key  (r) = Recorded By, (t) = Taken By, (c) = Cosigned By      Initials Name Provider Type    Aracelis Fernandez PT Physical Therapist                   Clinical Impression       Row Name 11/19/23 1308          Pain    Pretreatment Pain Rating 0/10 - no pain  -LW     Posttreatment Pain Rating 0/10 - no pain  -LW       Row Name 11/19/23 1308          Plan of Care Review    Plan of Care Reviewed With patient  -LW     Outcome Evaluation Patient is a 76 y/o male admitted to [facility] for septic shock, acute respiratory failure, and COD exacerbation. He currently lives at home with his spouse, son, and son's wife with 3 SHRAVAN. Prior to admission he was indep with all mobility. Today he presented with decreaed tolerance to activity. He  was SBA for bed mobiltiy and stood CGA with rwx. He ambualted 80' CGA and rwx. No unsteadiness or LOB. He had min-mod SOA. O2 remained above 90. Patient will benefit from contonued skilled PT addressing limitations in funtional mobiltiy to maximize safety and indep on d/c. HHPT recommended.  -LW       Row Name 11/19/23 1308          Therapy Assessment/Plan (PT)    Rehab Potential (PT) good, to achieve stated therapy goals  -LW     Criteria for Skilled Interventions Met (PT) yes  -LW     Therapy Frequency (PT) 5 times/wk  -LW       Row Name 11/19/23 1308          Vital Signs    Intra SpO2 (%) 96  -LW     O2 Delivery Intra Treatment room air  -LW     Post SpO2 (%) 100  -LW     O2 Delivery Post Treatment room air  -LW       Row Name 11/19/23 1308          Positioning and Restraints    Pre-Treatment Position in bed  -LW     Post Treatment Position bed  -LW     In Bed fowlers;call light within reach;encouraged to call for assist;exit alarm on;with family/caregiver  -LW               User Key  (r) = Recorded By, (t) = Taken By, (c) = Cosigned By      Initials Name Provider Type    Aracelis Fernandez, PT Physical Therapist                   Outcome Measures       Row Name 11/19/23 1311 11/19/23 0400       How much help from another person do you currently need...    Turning from your back to your side while in flat bed without using bedrails? 4  -LW 4  -BB    Moving from lying on back to sitting on the side of a flat bed without bedrails? 4  -LW 4  -BB    Moving to and from a bed to a chair (including a wheelchair)? 3  -LW 3  -BB    Standing up from a chair using your arms (e.g., wheelchair, bedside chair)? 3  -LW 3  -BB    Climbing 3-5 steps with a railing? 3  -LW 3  -BB    To walk in hospital room? 3  -LW 3  -BB    AM-PAC 6 Clicks Score (PT) 20  -LW 20  -BB    Highest Level of Mobility Goal 6 --> Walk 10 steps or more  -LW 6 --> Walk 10 steps or more  -BB      Row Name 11/19/23 1311          Functional Assessment    Outcome  Measure Options AM-PAC 6 Clicks Basic Mobility (PT)  -               User Key  (r) = Recorded By, (t) = Taken By, (c) = Cosigned By      Initials Name Provider Type    BB Reuben Wong, RN Registered Nurse    Aracelis Fernandez PT Physical Therapist                                 Physical Therapy Education       Title: PT OT SLP Therapies (Done)       Topic: Physical Therapy (Done)       Point: Mobility training (Done)       Learning Progress Summary             Patient Acceptance, E,TB, VU by  at 11/19/2023 1311                         Point: Home exercise program (Done)       Learning Progress Summary             Patient Acceptance, E,TB, VU by  at 11/19/2023 1311                         Point: Body mechanics (Done)       Learning Progress Summary             Patient Acceptance, E,TB, VU by  at 11/19/2023 1311                         Point: Precautions (Done)       Learning Progress Summary             Patient Acceptance, E,TB, VU by  at 11/19/2023 1311                                         User Key       Initials Effective Dates Name Provider Type Discipline     05/08/23 -  Aracelis Malloy PT Physical Therapist PT                  PT Recommendation and Plan     Plan of Care Reviewed With: patient  Outcome Evaluation: Patient is a 76 y/o male admitted to [facility] for septic shock, acute respiratory failure, and COD exacerbation. He currently lives at home with his spouse, son, and son's wife with 3 SHRAVAN. Prior to admission he was indep with all mobility. Today he presented with decreaed tolerance to activity. He was SBA for bed mobiltiy and stood CGA with rwx. He ambualted 80' CGA and rwx. No unsteadiness or LOB. He had min-mod SOA. O2 remained above 90. Patient will benefit from contonued skilled PT addressing limitations in funtional mobiltiy to maximize safety and indep on d/c. HHPT recommended.     Time Calculation:         PT Charges       Row Name 11/19/23 1311             Time Calculation     Start Time 1245  -LW      Stop Time 1259  -LW      Time Calculation (min) 14 min  -LW      PT Received On 11/19/23  -LW      PT - Next Appointment 11/20/23  -LW      PT Goal Re-Cert Due Date 11/26/23  -LW         Time Calculation- PT    Total Timed Code Minutes- PT 10 minute(s)  -LW         Timed Charges    74939 - PT Therapeutic Activity Minutes 10  -LW         Total Minutes    Timed Charges Total Minutes 10  -LW       Total Minutes 10  -LW                User Key  (r) = Recorded By, (t) = Taken By, (c) = Cosigned By      Initials Name Provider Type    Aracelis Fernandez, PT Physical Therapist                  Therapy Charges for Today       Code Description Service Date Service Provider Modifiers Qty    00030867369 HC PT THERAPEUTIC ACT EA 15 MIN 11/19/2023 Aracelis Malloy, PT GP 1    48769630496 HC PT EVAL MOD COMPLEXITY 3 11/19/2023 Aracelis Malloy, PT GP 1            PT G-Codes  Outcome Measure Options: AM-PAC 6 Clicks Basic Mobility (PT)  AM-PAC 6 Clicks Score (PT): 20  PT Discharge Summary  Anticipated Discharge Disposition (PT): home with home health    Aracelis Malloy PT  11/19/2023

## 2023-11-19 NOTE — PLAN OF CARE
Goal Outcome Evaluation:   Due antibiotics given as ordered. Remain on RA O2 sats stable. No other complaints made , will observe for any untoward sign and symptoms and refer accordingly.

## 2023-11-20 ENCOUNTER — READMISSION MANAGEMENT (OUTPATIENT)
Dept: CALL CENTER | Facility: HOSPITAL | Age: 78
End: 2023-11-20
Payer: MEDICARE

## 2023-11-20 ENCOUNTER — APPOINTMENT (OUTPATIENT)
Dept: GENERAL RADIOLOGY | Facility: HOSPITAL | Age: 78
DRG: 871 | End: 2023-11-20
Payer: OTHER GOVERNMENT

## 2023-11-20 VITALS
TEMPERATURE: 98.2 F | BODY MASS INDEX: 22.25 KG/M2 | OXYGEN SATURATION: 97 % | RESPIRATION RATE: 20 BRPM | WEIGHT: 164.24 LBS | HEIGHT: 72 IN | DIASTOLIC BLOOD PRESSURE: 69 MMHG | SYSTOLIC BLOOD PRESSURE: 149 MMHG | HEART RATE: 76 BPM

## 2023-11-20 PROBLEM — R65.21 SEPTIC SHOCK: Status: RESOLVED | Noted: 2023-11-17 | Resolved: 2023-11-20

## 2023-11-20 PROBLEM — A41.9 SEPTIC SHOCK: Status: RESOLVED | Noted: 2023-11-17 | Resolved: 2023-11-20

## 2023-11-20 LAB
DEPRECATED RDW RBC AUTO: 41.2 FL (ref 37–54)
ERYTHROCYTE [DISTWIDTH] IN BLOOD BY AUTOMATED COUNT: 12.4 % (ref 12.3–15.4)
HCT VFR BLD AUTO: 38.3 % (ref 37.5–51)
HGB BLD-MCNC: 12.7 G/DL (ref 13–17.7)
MAGNESIUM SERPL-MCNC: 2 MG/DL (ref 1.6–2.4)
MCH RBC QN AUTO: 30.2 PG (ref 26.6–33)
MCHC RBC AUTO-ENTMCNC: 33.2 G/DL (ref 31.5–35.7)
MCV RBC AUTO: 91 FL (ref 79–97)
PLATELET # BLD AUTO: 124 10*3/MM3 (ref 140–450)
PMV BLD AUTO: 9.4 FL (ref 6–12)
POTASSIUM SERPL-SCNC: 4.1 MMOL/L (ref 3.5–5.2)
RBC # BLD AUTO: 4.21 10*6/MM3 (ref 4.14–5.8)
WBC NRBC COR # BLD AUTO: 10.04 10*3/MM3 (ref 3.4–10.8)

## 2023-11-20 PROCEDURE — 94799 UNLISTED PULMONARY SVC/PX: CPT

## 2023-11-20 PROCEDURE — 94760 N-INVAS EAR/PLS OXIMETRY 1: CPT

## 2023-11-20 PROCEDURE — 85027 COMPLETE CBC AUTOMATED: CPT | Performed by: INTERNAL MEDICINE

## 2023-11-20 PROCEDURE — 94664 DEMO&/EVAL PT USE INHALER: CPT

## 2023-11-20 PROCEDURE — 94761 N-INVAS EAR/PLS OXIMETRY MLT: CPT

## 2023-11-20 PROCEDURE — 99232 SBSQ HOSP IP/OBS MODERATE 35: CPT | Performed by: INTERNAL MEDICINE

## 2023-11-20 PROCEDURE — 83735 ASSAY OF MAGNESIUM: CPT | Performed by: INTERNAL MEDICINE

## 2023-11-20 PROCEDURE — 74230 X-RAY XM SWLNG FUNCJ C+: CPT

## 2023-11-20 PROCEDURE — 92611 MOTION FLUOROSCOPY/SWALLOW: CPT

## 2023-11-20 PROCEDURE — 63710000001 PREDNISONE PER 1 MG: Performed by: INTERNAL MEDICINE

## 2023-11-20 PROCEDURE — 84132 ASSAY OF SERUM POTASSIUM: CPT | Performed by: INTERNAL MEDICINE

## 2023-11-20 RX ORDER — PREDNISONE 10 MG/1
10 TABLET ORAL DAILY
Qty: 7 TABLET | Refills: 0 | Status: SHIPPED | OUTPATIENT
Start: 2023-11-29 | End: 2023-11-20

## 2023-11-20 RX ORDER — PREDNISONE 20 MG/1
10 TABLET ORAL 2 TIMES DAILY
Qty: 7 TABLET | Refills: 0 | Status: SHIPPED | OUTPATIENT
Start: 2023-11-22 | End: 2023-11-20 | Stop reason: SDUPTHER

## 2023-11-20 RX ORDER — PREDNISONE 5 MG/1
5 TABLET ORAL DAILY
Qty: 88 TABLET | Refills: 0 | Status: SHIPPED | OUTPATIENT
Start: 2023-12-06 | End: 2024-01-05

## 2023-11-20 RX ORDER — PREDNISONE 10 MG/1
10 TABLET ORAL DAILY
Qty: 7 TABLET | Refills: 0 | Status: SHIPPED | OUTPATIENT
Start: 2023-11-29 | End: 2023-11-20 | Stop reason: SDUPTHER

## 2023-11-20 RX ORDER — AZITHROMYCIN 250 MG/1
250 TABLET, FILM COATED ORAL 3 TIMES WEEKLY
Qty: 26 TABLET | Refills: 0 | Status: SHIPPED | OUTPATIENT
Start: 2023-11-20 | End: 2024-01-19

## 2023-11-20 RX ORDER — PREDNISONE 10 MG/1
20 TABLET ORAL 2 TIMES DAILY
Qty: 8 TABLET | Refills: 0 | Status: SHIPPED | OUTPATIENT
Start: 2023-11-20 | End: 2023-11-20 | Stop reason: SDUPTHER

## 2023-11-20 RX ORDER — AZITHROMYCIN 250 MG/1
250 TABLET, FILM COATED ORAL 3 TIMES WEEKLY
Qty: 6 TABLET | Refills: 0 | Status: SHIPPED | OUTPATIENT
Start: 2023-11-20 | End: 2023-11-20 | Stop reason: SDUPTHER

## 2023-11-20 RX ORDER — PREDNISONE 10 MG/1
20 TABLET ORAL 2 TIMES DAILY
Qty: 8 TABLET | Refills: 0 | Status: SHIPPED | OUTPATIENT
Start: 2023-11-20 | End: 2023-11-20

## 2023-11-20 RX ORDER — AMOXICILLIN AND CLAVULANATE POTASSIUM 875; 125 MG/1; MG/1
1 TABLET, FILM COATED ORAL EVERY 12 HOURS SCHEDULED
Qty: 8 TABLET | Refills: 0 | Status: SHIPPED | OUTPATIENT
Start: 2023-11-20 | End: 2023-11-20 | Stop reason: SDUPTHER

## 2023-11-20 RX ORDER — PREDNISONE 5 MG/1
5 TABLET ORAL DAILY
Qty: 30 TABLET | Refills: 0 | Status: SHIPPED | OUTPATIENT
Start: 2023-12-06 | End: 2023-11-20 | Stop reason: SDUPTHER

## 2023-11-20 RX ORDER — PREDNISONE 20 MG/1
10 TABLET ORAL 2 TIMES DAILY
Qty: 7 TABLET | Refills: 0 | Status: SHIPPED | OUTPATIENT
Start: 2023-11-22 | End: 2023-11-20

## 2023-11-20 RX ORDER — AMOXICILLIN AND CLAVULANATE POTASSIUM 875; 125 MG/1; MG/1
1 TABLET, FILM COATED ORAL EVERY 12 HOURS SCHEDULED
Qty: 8 TABLET | Refills: 0 | Status: SHIPPED | OUTPATIENT
Start: 2023-11-20 | End: 2023-11-24

## 2023-11-20 RX ADMIN — AMOXICILLIN AND CLAVULANATE POTASSIUM 1 TABLET: 875; 125 TABLET, FILM COATED ORAL at 09:03

## 2023-11-20 RX ADMIN — Medication 4 ML: at 07:27

## 2023-11-20 RX ADMIN — BARIUM SULFATE 50 ML: 400 SUSPENSION ORAL at 10:02

## 2023-11-20 RX ADMIN — METOPROLOL TARTRATE 75 MG: 25 TABLET, FILM COATED ORAL at 09:06

## 2023-11-20 RX ADMIN — SACUBITRIL AND VALSARTAN 1 TABLET: 24; 26 TABLET, FILM COATED ORAL at 09:02

## 2023-11-20 RX ADMIN — FAMOTIDINE 20 MG: 20 TABLET ORAL at 09:02

## 2023-11-20 RX ADMIN — PREDNISONE 20 MG: 20 TABLET ORAL at 09:02

## 2023-11-20 RX ADMIN — IPRATROPIUM BROMIDE AND ALBUTEROL SULFATE 3 ML: 2.5; .5 SOLUTION RESPIRATORY (INHALATION) at 11:01

## 2023-11-20 RX ADMIN — IPRATROPIUM BROMIDE AND ALBUTEROL SULFATE 3 ML: 2.5; .5 SOLUTION RESPIRATORY (INHALATION) at 03:34

## 2023-11-20 RX ADMIN — BARIUM SULFATE 4 ML: 980 POWDER, FOR SUSPENSION ORAL at 10:02

## 2023-11-20 RX ADMIN — BARIUM SULFATE 55 ML: 0.81 POWDER, FOR SUSPENSION ORAL at 10:02

## 2023-11-20 RX ADMIN — GUAIFENESIN 1200 MG: 600 TABLET, EXTENDED RELEASE ORAL at 09:03

## 2023-11-20 RX ADMIN — TIOTROPIUM BROMIDE INHALATION SPRAY 2 PUFF: 3.12 SPRAY, METERED RESPIRATORY (INHALATION) at 07:28

## 2023-11-20 RX ADMIN — ASPIRIN 81 MG: 81 TABLET, COATED ORAL at 09:02

## 2023-11-20 RX ADMIN — IPRATROPIUM BROMIDE AND ALBUTEROL SULFATE 3 ML: 2.5; .5 SOLUTION RESPIRATORY (INHALATION) at 07:26

## 2023-11-20 RX ADMIN — BARIUM SULFATE 1 TEASPOON(S): 0.6 CREAM ORAL at 10:02

## 2023-11-20 NOTE — OUTREACH NOTE
Prep Survey      Flowsheet Row Responses   [facility] facility patient discharged from? [place]   Is LACE score < 7 ? No   Eligibility Readm Mgmt   Discharge diagnosis septic shock, COPD exacerbation, PNA   Does the patient have one of the following disease processes/diagnoses(primary or secondary)? Sepsis   Does the patient have Home health ordered? No   Is there a DME ordered? No   Prep survey completed? Yes            Oumou JIANG - Registered Nurse

## 2023-11-20 NOTE — PROGRESS NOTES
LOS: 3 days   Patient Care Team:  System, Provider Not In as PCP - General    Chief Complaint: Follow-up coronary artery disease, paroxysmal atrial fibrillation, left ventricular apical thrombus    Interval History: Feels much better in general.  No significant chest pain.  No new shortness of breath.    Vital Signs:  Temp:  [97.7 °F (36.5 °C)-98.4 °F (36.9 °C)] 98.2 °F (36.8 °C)  Heart Rate:  [71-82] 76  Resp:  [18-22] 20  BP: (112-149)/(62-69) 149/69    Intake/Output Summary (Last 24 hours) at 11/20/2023 1127  Last data filed at 11/20/2023 0907  Gross per 24 hour   Intake 600 ml   Output 600 ml   Net 0 ml       Physical Exam:   General Appearance:    No acute distress, alert and oriented x4   Lungs:     Decreased breath sounds bilaterally.    Heart:    Regular rhythm and normal rate.  No murmurs, gallops, or   rubs.   Abdomen:     Soft, nontender, nondistended.    Extremities:   No clubbing, cyanosis, or edema.     Results Review:    Results from last 7 days   Lab Units 11/20/23  0840 11/17/23  1721 11/16/23  2355   SODIUM mmol/L  --   --  135*   POTASSIUM mmol/L 4.1   < > 4.9   CHLORIDE mmol/L  --   --  101   CO2 mmol/L  --   --  24.0   BUN mg/dL  --   --  19   CREATININE mg/dL  --   --  1.16   GLUCOSE mg/dL  --   --  113*   CALCIUM mg/dL  --   --  8.7    < > = values in this interval not displayed.     Results from last 7 days   Lab Units 11/17/23  0206 11/16/23  2355   HSTROP T ng/L 41* 28*     Results from last 7 days   Lab Units 11/20/23  0506   WBC 10*3/mm3 10.04   HEMOGLOBIN g/dL 12.7*   HEMATOCRIT % 38.3   PLATELETS 10*3/mm3 124*     Results from last 7 days   Lab Units 11/18/23  1011 11/18/23  0447 11/17/23  2348 11/17/23  0856 11/16/23  2355   INR   --   --   --   --  0.93   APTT seconds 74.8* 74.1* 162.8*   < > 24.8    < > = values in this interval not displayed.         Results from last 7 days   Lab Units 11/20/23  0840   MAGNESIUM mg/dL 2.0           I reviewed the patient's new clinical results.         Assessment:  1.  Left lower lobe pneumonia  2.  Left ventricular apical thrombus  3.  COPD with acute exacerbation  4.  Acute hypoxic respiratory failure  5.  Coronary artery disease, status post 2 separate CABG surgeries  6.  Paroxysmal atrial fibrillation  7.  Elevated troponin, likely type II NSTEMI secondary to #1 - #4.  8.  Ischemic cardiomyopathy, EF 35-40%  9.  Significantly elevated D-dimer with no PE on CTA    Plan:  -Hypotension has resolved.  Resume home medications, including Entresto.  No evidence of acute on chronic CHF.    -Start Eliquis 5 mg twice daily as an outpatient for the left ventricular apical thrombus.    -Continue aspirin and Lipitor.  No evidence of angina.  Elevated troponin is a type II NSTEMI secondary to the above as noted.    -[facility] on [facility]23.  [place]ong for discharge from a cardiac perspective.    Frankie Maravilla MD  11/20/23  11:27 EST

## 2023-11-20 NOTE — MBS/VFSS/FEES
Acute Care - Speech Language Pathology   Swallow Initial Evaluation [facility]     Patient Name: Wisam Malone  : 1945  MRN: [id number]  Today's Date: 2023               Admit Date: 2023    Visit Dx:     ICD-10-CM ICD-9-CM   1. Pneumonia due to infectious organism, unspecified laterality, unspecified part of lung  J18.9 486   2. Acute respiratory failure with hypoxia  J96.01 518.81   3. COPD with acute exacerbation  J44.1 491.21   4. Sepsis, due to unspecified organism, unspecified whether acute organ dysfunction present  A41.9 038.9     995.91   5. Septic shock  A41.9 038.9    R65.21 785.52     995.92     Patient Active Problem List   Diagnosis   (none) - all problems resolved or deleted     History reviewed. No pertinent past medical history.  History reviewed. No pertinent surgical history.    SLP Recommendation and Plan  SLP Swallowing Diagnosis: mild-moderate, oral dysphagia, pharyngeal dysphagia (23)  SLP Diet Recommendation: regular textures, thin liquids (23)  Recommended Precautions and Strategies: upright posture during/after eating, small bites of food and sips of liquid, no straw, multiple swallows per sip of liquid, multiple swallows per bite of food, chin tuck, other (see comments) (chin tuck w/ thins) (23)  SLP Rec. for Method of Medication Administration: meds whole, with puree (23)     Monitor for Signs of Aspiration: yes (23)     Swallow Criteria for Skilled Therapeutic Interventions Met: demonstrates skilled criteria (23)  Anticipated Discharge Disposition (SLP): home (23)  Rehab Potential/Prognosis, Swallowing: good, to achieve stated therapy goals (23)  Therapy Frequency (Swallow): PRN (23)  Predicted Duration Therapy Intervention (Days): until discharge (23)  Oral Care Recommendations: Oral Care BID/PRN (23)  Demonstrates Need for Referral to Another  Service: speech therapy (11/20/23 1100)                                   Oral Care Recommendations: Oral Care BID/PRN (11/20/23 1100)    Plan of Care Reviewed With: patient  Outcome Evaluation: Swallow eval completed. Bipap removed for eval and sats remained >90. Pt had inconsistent slight throat clear/cough as Bipap was removed and inconsistently throughout the eval. No overt s/s directly after trials including thin, pureed, soft, mixed, and regular. Mastication was functional. Laryngeal elevation was adequate with palpation. MD concerned for aspiration. Recommend continue on regular and thin; meds as tolerated; urpight for meals and 30 min after; slow rate; small bites/sips. ST to schedule VFSS Sat/Mon to r/o aspiration.      SWALLOW EVALUATION (last 72 hours)       SLP Adult Swallow Evaluation       Row Name 11/20/23 1100 11/17/23 1600                Rehab Evaluation    Document Type evaluation  -AW evaluation  -AW       Subjective Information no complaints  -AW no complaints  -AW       Patient Observations alert;cooperative;agree to therapy  -AW alert;cooperative;agree to therapy  -AW       Patient/Family/Caregiver Comments/Observations -- Pt's spouse and other family members were present.  -AW       Patient Effort good  -AW good  -AW       Symptoms Noted During/After Treatment none  -AW none  -AW          General Information    Patient Profile Reviewed yes  -AW yes  -AW       Pertinent History Of Current Problem Pt admitted with respiratory failure, LLL PNA, septic shock, and COPD exacerbation  -AW --       Current Method of Nutrition regular textures;thin liquids  -AW regular textures;thin liquids  -AW       Precautions/Limitations, Vision WFL;for purposes of eval  -AW WFL;for purposes of eval  -AW       Precautions/Limitations, Hearing WFL  -AW WFL;for purposes of eval  -AW       Prior Level of Function-Communication WFL  -AW WFL  -AW       Prior Level of Function-Swallowing no diet consistency restrictions   -AW no diet consistency restrictions  -AW       Plans/Goals Discussed with patient;agreed upon  -AW patient;family;spouse/S.O.;agreed upon  -AW       Barriers to Rehab none identified  -AW medically complex  -AW       Patient's Goals for Discharge return home  -AW return home  -AW       Family Goals for Discharge -- family did not state  -AW          Pain    Additional Documentation Pain Scale: Numbers Pre/Post-Treatment (Group)  -AW Pain Scale: Numbers Pre/Post-Treatment (Group)  -AW          Pain Scale: Numbers Pre/Post-Treatment    Pretreatment Pain Rating 0/10 - no pain  -AW 0/10 - no pain  -AW       Posttreatment Pain Rating 0/10 - no pain  -AW 0/10 - no pain  -AW          Oral Motor Structure and Function    Dentition Assessment upper dentures/partial in place;lower dentures/partial in place  -AW upper dentures/partial in place;lower dentures/partial in place  -AW       Secretion Management WNL/WFL  -AW WNL/WFL  -AW       Mucosal Quality moist, healthy  -AW moist, healthy  -AW          Oral Musculature and Cranial Nerve Assessment    Oral Motor General Assessment WFL  -AW WFL  -AW          General Eating/Swallowing Observations    Respiratory Support Currently in Use room air  -AW other (see comments)  BiPap removed for eval. Sats remained >90  -AW       Eating/Swallowing Skills self-fed;fed by SLP  -AW self-fed  -AW       Positioning During Eating upright in bed  -AW upright in bed  -AW       Utensils Used -- cup;spoon;straw  -AW       Consistencies Trialed -- thin liquids;pureed;soft to chew textures;mixed consistency;regular textures  -AW          Clinical Swallow Eval    Clinical Swallow Evaluation Summary -- Swallow eval completed. Bipap removed for eval and sats remained >90. Pt had inconsistent slight throat clear/cough as Bipap was removed and inconsistently throughout the eval. No overt s/s directly after trials including thin, pureed, soft, mixed, and regular. Mastication was functional. Laryngeal  elevation was adequate with palpation. MD concerned for aspiration. Recommend continue on regular and thin; meds as tolerated; urpight for meals and 30 min after; slow rate; small bites/sips. ST to schedule VFSS Sat/Mon to r/o aspiration.  -AW          MBS/VFSS    Utensils Used spoon;cup;straw  -AW --       Consistencies Trialed regular textures;soft to chew textures;mixed consistency;pureed;thin liquids;nectar/syrup-thick liquids  -AW --          MBS/VFSS Interpretation    VFSS Summary VFSS completed with Dr. Vargas. Pt exhibited mild to moderate  oropharyngeal dysphagia characterized by mistiming. Pt had silent penetration before and during the swallow initially with thins via cup, non transient at times. At the end of the study, pt had silent penetration with trace aspiration with thin via cup. Chin tuck reduced penetration with thins. No penetration or aspiration occurred with nectar thick via cup, pureed, soft solids, mixed, or regular consistencies. Shallow penetration occurred with straw sips of nectar. Mastication was functional. Pt swallowed spontaneously to assist with clearing mild pharyngeal residuals (tongue base, valleculae, pyriforms). Discussed results of VFSS with pt after the study. Recommend regular with thins, single cup sips with chin tuck. An alternative would be nectar thick liquids. Outpatient ST was also recommended, however, pt was not interested. Education completed on the risks of aspiration and written information was given on strategies including chin tuck.  -AW --          SLP Communication to Radiology    Summary Statement VFSS completed with Dr. Vargas. Pt exhibited mild to moderate oropharyngeal dysphagia characterized by mistiming. Pt had silent penetration before and during the swallow initially with thins via cup, non transient at times. At the end of the study, pt had silent penetration with trace aspiration with thin via cup. Chin tuck reduced penetration with thins. No  penetration or aspiration occurred with nectar thick via cup, pureed, soft solids, mixed, or regular consistencies. Shallow penetration occurred with straw sips of nectar. Mastication was functional. Pt swallowed spontaneously to assist with clearing mild pharyngeal residuals (tongue base, valleculae, pyriforms).  -AW --          SLP Evaluation Clinical Impression    SLP Swallowing Diagnosis mild-moderate;oral dysphagia;pharyngeal dysphagia  -AW R/O pharyngeal dysphagia  -AW       Functional Impact risk of aspiration/pneumonia  -AW risk of aspiration/pneumonia  -AW       Rehab Potential/Prognosis, Swallowing good, to achieve stated therapy goals  -AW good, to achieve stated therapy goals  -AW       Swallow Criteria for Skilled Therapeutic Interventions Met demonstrates skilled criteria  -AW demonstrates skilled criteria  -AW          Recommendations    Therapy Frequency (Swallow) PRN  -AW PRN  -AW       Predicted Duration Therapy Intervention (Days) until discharge  -AW until discharge  -AW       SLP Diet Recommendation regular textures;thin liquids  -AW regular textures;thin liquids  -AW       Recommended Diagnostics -- VFSS (MBS)  -AW       Recommended Precautions and Strategies upright posture during/after eating;small bites of food and sips of liquid;no straw;multiple swallows per sip of liquid;multiple swallows per bite of food;chin tuck;other (see comments)  chin tuck w/ thins  -AW upright posture during/after eating;small bites of food and sips of liquid  -AW       Oral Care Recommendations Oral Care BID/PRN  -AW Oral Care BID/PRN  -AW       SLP Rec. for Method of Medication Administration meds whole;with puree  -AW meds whole;as tolerated  -AW       Monitor for Signs of Aspiration yes  -AW yes  -AW       Anticipated Discharge Disposition (SLP) home  -AW unknown  -AW       Demonstrates Need for Referral to Another Service speech therapy  -AW --          Swallow Goals (SLP)    Swallow STGs -- diet tolerance  goal selection (SLP)  -AW          (STG) Patient will tolerate trials of    Consistencies Trialed (Tolerate trials) -- regular textures;thin liquids  -AW       Desired Outcome (Tolerate trials) -- without signs/symptoms of aspiration  -AW       [place] (Tolerate trials) -- independently (over 90% accuracy)  -AW       Time Frame (Tolerate trials) -- by discharge  -AW                 User Key  (r) = Recorded By, (t) = Taken By, (c) = Cosigned By      Initials Name Effective Dates    Pinky Stacy SLP 08/28/23 -                     EDUCATION  The patient has been educated in the following areas:   Dysphagia (Swallowing Impairment) Oral Care/Hydration Modified Diet Instruction.        SLP GOALS       Row Name 11/17/23 1600             (STG) Patient will tolerate trials of    Consistencies Trialed (Tolerate trials) regular textures;thin liquids  -AW      Desired Outcome (Tolerate trials) without signs/symptoms of aspiration  -AW      [place] (Tolerate trials) independently (over 90% accuracy)  -AW      Time Frame (Tolerate trials) by discharge  -AW                User Key  (r) = Recorded By, (t) = Taken By, (c) = Cosigned By      Initials Name Provider Type    Pinky Stacy SLP Speech and Language Pathologist                       Time Calculation:    Time Calculation- SLP       Row Name 11/20/23 1211             Time Calculation- SLP    SLP Start Time 0930  -AW      SLP Received On 11/20/23  -AW                User Key  (r) = Recorded By, (t) = Taken By, (c) = Cosigned By      Initials Name Provider Type    Pinky Stacy SLP Speech and Language Pathologist                    Therapy Charges for Today       Code Description Service Date Service Provider Modifiers Qty    60142289986 HC ST MOTION FLUORO EVAL SWALLOW 5 11/20/2023 Pinky Forrest SLP GN 1                 NEEMA Covington  11/20/2023

## 2023-11-20 NOTE — DISCHARGE SUMMARY
DISCHARGE SUMMARY    Patient Name: Wisam Malone  Age/Sex: 77 y.o. male  : 1945  MRN: [id number]  Patient Care Team:  System, Provider Not In as PCP - General       Date of Admit: 2023  Date of Discharge:  23  Discharge Condition: Stable    Discharge Diagnoses:  COPD with severe emphysema with recurrent ER visit and bronchitis attack  Chronic hypercapnic respiratory failure, on nocturnal ventilation  Pneumonia  COPD exacerbation  Coronary artery disease  Atrial fibrillation on anticoagulation, currently on heparin drip  Steroid-induced hyperglycemia, improved with steroid taper  Ischemic cardiomyopathy EF 20-25%  Patient elevated D-dimer, ruled out for pulmonary embolism      History of present illness from H&P from 2023:   77-year-old male with a history of COPD presents with shortness of breath.  Symptoms of shortness of breath started several hours prior to calling EMS.  His nebulized medications at home were not helping.  Does not use oxygen at home but apparently a couple years ago was prescribed oxygen.  EMS was concerned for flash pulmonary edema and he received sublingual nitro and was on CPAP in the ambulance.  His initial saturations were in the low 80s and he had improvement with positive airway pressure.  In the emergency room they evaluated and felt it was more consistent with pneumonia.  Chest x-ray shows new left lower lobe infiltrate with leukocytosis.  Received antibiotics and was initially going to be admitted to the floor to hospitalist service but developed hypotension and now requiring pressor.     Hospital Course:   Wisam Malone presented to [facility] with complaints of respiratory distress, hypotension was treated for sepsis with shock, patient had cardiomyopathy show we had to be careful with the fluid resuscitation.  He had a history of recurrent bronchitis attack and patient will be discharged on a modified regimen with 3 times weekly  Zithromax in addition to slow prednisone taper down to the 5 mg daily dose until [facility]  He did have some steroid-induced hyperglycemia when he was on the higher dose, that was no longer an issue once the steroid dose was tapered down  He had some difficulty with expectoration and he did better with the pulmonary toilet regimen  He has been on room air  Patient was using the BiPAP at night and he does the same thing at home.  He feels like he is back to his baseline and he feels ready for discharge  Has history of A-fib, was seen by cardiology and needed to be on the anticoagulation after discharge per cardiology recommendation  Patient had evidence of infiltrate on the CT scan suggestive of pneumonia, was treated with antibiotic with Zosyn and was later transitioned to oral Augmentin and he will finish his course after discharge before starting the 3 times weekly Zithromax  Consults:   IP CONSULT TO PULMONOLOGY  IP CONSULT TO CASE MANAGEMENT   IP CONSULT TO CARDIOLOGY    Significant Discharge Diagnostics   Procedures Performed:         Pertinent Lab Results:  Results from last 7 days   Lab Units 11/18/23  0625 11/17/23  1721 11/16/23 2355   SODIUM mmol/L  --   --  135*   POTASSIUM mmol/L 4.9 2.7* 4.9   CHLORIDE mmol/L  --   --  101   CO2 mmol/L  --   --  24.0   BUN mg/dL  --   --  19   CREATININE mg/dL  --   --  1.16   GLUCOSE mg/dL  --   --  113*   CALCIUM mg/dL  --   --  8.7   AST (SGOT) U/L  --   --  23   ALT (SGPT) U/L  --   --  43*     Results from last 7 days   Lab Units 11/17/23  0206 11/16/23 2355   HSTROP T ng/L 41* 28*     Results from last 7 days   Lab Units 11/20/23  0506 11/18/23  0625 11/18/23  0447 11/16/23 2355   WBC 10*3/mm3 10.04 13.88* 13.81* 22.91*   HEMOGLOBIN g/dL 12.7* 11.9* 11.5* 15.4   HEMATOCRIT % 38.3 35.8* 34.4* 45.7   PLATELETS 10*3/mm3 124* 98* 92* 144   MCV fL 91.0 91.1 90.8 91.0   MCH pg 30.2 30.3 30.3 30.7   MCHC g/dL 33.2 33.2  33.4 33.7   RDW % 12.4 12.4 12.4 12.6   RDW-SD fl 41.2 41.2 41.2 42.1   MPV fL 9.4 9.1 9.5 9.4   NEUTROPHIL % %  --  93.4* 94.1* 92.3*   LYMPHOCYTE % %  --  3.4* 3.0* 4.4*   MONOCYTES % %  --  2.2* 2.2* 2.2*   EOSINOPHIL % %  --  0.0* 0.0* 0.2*   BASOPHIL % %  --  0.1 0.1 0.2   IMM GRAN % %  --  0.9* 0.6* 0.7*   NEUTROS ABS 10*3/mm3  --  12.97* 12.98* 21.14*   LYMPHS ABS 10*3/mm3  --  0.47* 0.42* 1.01   MONOS ABS 10*3/mm3  --  0.30 0.31 0.50   EOS ABS 10*3/mm3  --  0.00 0.00 0.04   BASOS ABS 10*3/mm3  --  0.02 0.02 0.05   IMMATURE GRANS (ABS) 10*3/mm3  --  0.12* 0.08* 0.17*   NRBC /100 WBC  --  0.0 0.0 0.0     Results from last 7 days   Lab Units 11/18/23  1011 11/18/23  0447 11/17/23  2348 11/17/23  1526 11/17/23  0856 11/16/23  2355   INR   --   --   --   --   --  0.93   APTT seconds 74.8* 74.1* 162.8* 167.8* 36.0* 24.8     Results from last 7 days   Lab Units 11/17/23  2348 11/17/23  1721 11/16/23  2355   MAGNESIUM mg/dL 3.2* 1.1* 1.9         Results from last 7 days   Lab Units 11/16/23  2355   PROBNP pg/mL 1,465.0         Results from last 7 days   Lab Units 11/16/23  2258   PH, ARTERIAL pH units 7.344*   PCO2, ARTERIAL mm Hg 47.6*   PO2 ART mm Hg 135.2*   HCO3 ART mmol/L 25.9     Results from last 7 days   Lab Units 11/17/23  0206 11/16/23  2355   PROCALCITONIN ng/mL 1.55*  --    LACTATE mmol/L 1.8 2.6*             Results from last 7 days   Lab Units 11/16/23  2355 11/16/23  2354   BLOODCX  No growth at 3 days No growth at 3 days         Results from last 7 days   Lab Units 11/16/23  2237   ADENOVIRUS DETECTION BY PCR  Not Detected   CORONAVIRUS 229E  Not Detected   CORONAVIRUS HKU1  Not Detected   CORONAVIRUS NL63  Not Detected   CORONAVIRUS OC43  Not Detected   HUMAN METAPNEUMOVIRUS  Not Detected   HUMAN RHINOVIRUS/ENTEROVIRUS  Not Detected   INFLUENZA B PCR  Not Detected   PARAINFLUENZA 1  Not Detected   PARAINFLUENZA VIRUS 2  Not Detected   PARAINFLUENZA VIRUS 3  Not Detected   PARAINFLUENZA VIRUS 4  Not  Detected   BORDETELLA PERTUSSIS PCR  Not Detected   BORDETELLA PARAPERTUSSIS PCR  Not Detected   CHLAMYDOPHILA PNEUMONIAE PCR  Not Detected   MYCOPLAMA PNEUMO PCR  Not Detected   RSV, PCR  Not Detected           Imaging Results:  Imaging Results (All)       Procedure Component Value Units Date/Time    CT Angiogram Chest [[id number]] Collected: 11/17/23 1135     Updated: 11/17/23 1247    Narrative:      CT ANGIOGRAM CHEST     HISTORY: Shortness of breath, possible pulmonary embolism.     TECHNIQUE: CT angiogram chest was performed using 95 mL of Isovue-370 IV  contrast. Multiplanar and three-dimensional reformatted images were  produced at a separate workstation. No prior CT for correlation.     Radiation dose reduction techniques were utilized, including automated  exposure control and exposure modulation based on body size.     FINDINGS: There are sternal wires and postoperative change from previous  coronary bypass. Extensive atheromatous vascular calcification is  observed. The thoracic aorta is normal in caliber and enhances normally.     The pulmonary arteries are normal in caliber and enhance normally. No  evidence of pulmonary embolism. No right ventricular enlargement.     Partially calcified small lymph nodes are observed in the mediastinum  and the right hilum. No lymphadenopathy.     The most caudad axial image partially demonstrates the upper edge of an  infrarenal abdominal aortic aneurysm measuring 40 x 41 mm at the most  caudad image. The aneurysm is not seen in its entirety. No prior abdomen  imaging present at this institution or denoted in the Care Everywhere  system.     Advanced emphysematous change diffusely throughout the lungs. There are  secretions in the airways but no focal airway mass lesion. Patchy areas  of parenchymal opacity are scattered throughout the posterior right  lower lobe and more extensively in the left lower lobe and the lingula.     Degenerative change in the thoracic spine  with nonsurgical osseous  ankylosis across multiple disc levels. No compression deformity or bone  lesion.       Impression:      1. Partially visualized infrarenal abdominal aortic aneurysm. The most  caudad image shows the aneurysm measuring about 40 mm diameter. There  does not appear to be any prior imaging of this lesion.  2. No pulmonary embolism or other acute vascular abnormality in the  chest. There has been previous coronary bypass.  3. Very extensive emphysematous change in the lungs. There are  secretions in the airways and patchy opacities in the lower lobes and  the lingula favored to represent aspiration pneumonitis or pneumonia.     This report was finalized on 11/17/2023 12:44 PM by Dr. Wisam Rosario M.D on Workstation: [id number]       XR Chest 1 View [[id number]] Collected: 11/16/23 2324     Updated: 11/16/23 2329    Narrative:      SINGLE VIEW OF THE CHEST     HISTORY: Shortness of breath     COMPARISON: None available.     FINDINGS:  Patient status post median sternotomy with coronary artery bypass  grafting. Heart size is within normal limits. No pneumothorax or pleural  effusion is seen. The patient is noted to have infiltrates at the left  lung base, suspicious for pneumonia. There are background changes of  COPD.       Impression:      Infiltrates identified within the left lung, suspicious for pneumonia.  Follow-up exam to document resolution is recommended.     This report was finalized on 11/16/2023 11:26 PM by Dr. Rosa Sanders M.D on Workstation: [id number]               Objective:   Temp:  [97.7 °F (36.5 °C)-98.4 °F (36.9 °C)] 97.7 °F (36.5 °C)  Heart Rate:  [71-82] 82  Resp:  [18-24] 20  BP: (112-132)/(62-68) 131/68   SpO2:  [96 %-100 %] 100 %  on  Flow (L/min):  [2] 2 Device (Oxygen Therapy): room air    Intake/Output Summary (Last 24 hours) at 11/20/2023 0834  Last data filed at 11/20/2023 0015  Gross per 24 hour   Intake 600 ml   Output 900 ml   Net -300 ml     Body mass  index is 22.28 kg/m².      11/18/23  1038 11/19/23  0619 11/20/23  0500   Weight: 73.9 kg (163 lb) 75.6 kg (166 lb 10.7 oz) 74.5 kg (164 lb 3.9 oz)     Weight change: 0.564 kg (1 lb 3.9 oz)    Physical Exam:  GEN: Pleasant, alert, cooperative, well developed, chronically ill but in no acute distress  EYES:   Sclerae clear. No icterus. PERRL. Normal EOM  ENT:   External ears/nose normal, no oral lesions, no thrush, mucous membranes moist  NECK:  Supple, midline trachea, no JVD  LUNGS: Normal chest on inspection, minimal rhonchi only audible upon coughing, no wheezes with improved breath sounds yet still diminished compared to normal. Respirations regular, even and unlabored.   CV:  Regular rhythm and rate. Normal S1/S2. No murmurs, gallops, or rubs noted.  ABD:  Soft, nontender and nondistended. Normal bowel sounds. No guarding  EXT:  Moves all extremities well. No cyanosis. No redness.  Minimal edema.   Skin: Dry, intact, no bleeding    Discharge Medications and Instructions:     Discharge Medications     Discharge Medications        New Medications        Instructions Start Date   amoxicillin-clavulanate 875-125 MG per tablet  Commonly known as: AUGMENTIN   1 tablet, Oral, Every 12 Hours Scheduled      apixaban 5 MG tablet tablet  Commonly known as: ELIQUIS   5 mg, Oral, 2 Times Daily      azithromycin 250 MG tablet  Commonly known as: ZITHROMAX   250 mg, Oral, 3 Times Weekly, Take 2 by mouth today then 1 daily for 4 days             Changes to Medications        Instructions Start Date   predniSONE 10 MG tablet  Commonly known as: DELTASONE  What changed:   how much to take  when to take this  additional instructions   20 mg, Oral, 2 Times Daily      predniSONE 20 MG tablet  Commonly known as: DELTASONE  What changed: You were already taking a medication with the same name, and this prescription was added. Make sure you understand how and when to take each.   10 mg, Oral, 2 Times Daily   Start Date: November 22,  2023     predniSONE 10 MG tablet  Commonly known as: DELTASONE  What changed: You were already taking a medication with the same name, and this prescription was added. Make sure you understand how and when to take each.   10 mg, Oral, Daily   Start Date: November 29, 2023     predniSONE 5 MG tablet  Commonly known as: DELTASONE  What changed: You were already taking a medication with the same name, and this prescription was added. Make sure you understand how and when to take each.   5 mg, Oral, Daily   Start Date: December 6, 2023            Continue These Medications        Instructions Start Date   albuterol sulfate  (90 Base) MCG/ACT inhaler  Commonly known as: PROVENTIL HFA;VENTOLIN HFA;PROAIR HFA   2 puffs, Inhalation, Every 4 Hours PRN      aspirin 81 MG EC tablet   81 mg, Oral, Daily      atorvastatin 80 MG tablet  Commonly known as: LIPITOR   80 mg, Oral, Daily      dextromethorphan-guaifenesin  MG/5ML syrup  Commonly known as: ROBITUSSIN-DM   5 mL, Oral, Every 6 Hours PRN      famotidine 20 MG tablet  Commonly known as: PEPCID   20 mg, Oral, 2 Times Daily      fluticasone 50 MCG/ACT nasal spray  Commonly known as: FLONASE   2 sprays, Nasal, Daily      guaiFENesin 600 MG 12 hr tablet  Commonly known as: MUCINEX   1,200 mg, Oral, 2 Times Daily      ipratropium-albuterol  MCG/ACT inhaler  Commonly known as: COMBIVENT RESPIMAT   1 puff, Inhalation, 4 Times Daily PRN      metoprolol tartrate 50 MG tablet  Commonly known as: LOPRESSOR   75 mg, Oral, 2 Times Daily      polyethylene glycol 17 g packet  Commonly known as: MIRALAX   17 g, Oral, Daily      sacubitril-valsartan 24-26 MG tablet  Commonly known as: ENTRESTO   1 tablet, Oral, 2 Times Daily      tiotropium bromide monohydrate 2.5 MCG/ACT aerosol solution inhaler  Commonly known as: SPIRIVA RESPIMAT   2 puffs, Inhalation, Daily - RT               Discharge Diet:    Dietary Orders (From admission, onward)       Start     Ordered     11/17/23 0219  Diet: Regular/House Diet; Texture: Regular Texture (IDDSI 7); Fluid Consistency: Thin (IDDSI 0)  Diet Effective Now        References:    Diet Order [place]   Question Answer Comment   Diets: Regular/House Diet    Texture: Regular Texture (IDDSI 7)    Fluid Consistency: Thin (IDDSI 0)        11/17/23 0218                    Activity at Discharge:   As tolerated    Discharge disposition: Home    Discharge Instructions and Follow ups:      Follow-up Information       System, Provider Not In .    Contact information:  [facility] [address]                           No future appointments.     Medication Reconciliation: Please see electronically completed Med Rec.    Total time spent discharging patient including evaluation, medication reconciliation, arranging follow up, and post hospitalization instructions and education total time exceeds 30 minutes.     Zelda Colbert MD  11/20/23  08:34 EST      Dictated utilizing Dragon dictation

## 2023-11-21 LAB
BACTERIA SPEC RESP CULT: NORMAL
GRAM STN SPEC: NORMAL

## 2023-11-22 LAB
BACTERIA SPEC AEROBE CULT: NORMAL
BACTERIA SPEC AEROBE CULT: NORMAL

## 2023-11-28 ENCOUNTER — READMISSION MANAGEMENT (OUTPATIENT)
Dept: CALL CENTER | Facility: HOSPITAL | Age: 78
End: 2023-11-28
Payer: MEDICARE

## 2023-11-28 NOTE — OUTREACH NOTE
Sepsis Week 1 Survey      Flowsheet Row Responses   [facility] facility patient discharged from? [place]   Does the patient have one of the following disease processes/diagnoses(primary or secondary)? Sepsis   Week 1 attempt successful? No   Unsuccessful attempts Attempt 1            JUN Garcia Registered Nurse

## 2023-12-01 ENCOUNTER — READMISSION MANAGEMENT (OUTPATIENT)
Dept: CALL CENTER | Facility: HOSPITAL | Age: 78
End: 2023-12-01
Payer: MEDICARE

## 2023-12-01 NOTE — OUTREACH NOTE
"Sepsis Week 1 Survey      Flowsheet Row Responses   [facility] patient discharged from? [place]   Does the patient have one of the following disease processes/diagnoses(primary or secondary)? Sepsis   Week 1 attempt successful? Yes   Call start time 1643   Revoke Readmitted  [facility]]   Call end time 1646   Discharge diagnosis septic shock, COPD exacerbation, PNA   Meds reviewed with patient/caregiver? Yes   DME comments % RA   Psychosocial issues? No   Comments Pt reports no fever at this time. Pt continues with \"rattling in chest\" and cough, he reports. [facility].   Call end time 1646            Argenis KING - Registered Nurse  "

## 2024-03-08 ENCOUNTER — APPOINTMENT (OUTPATIENT)
Dept: GENERAL RADIOLOGY | Facility: HOSPITAL | Age: 79
DRG: 190 | End: 2024-03-08
Payer: MEDICARE

## 2024-03-08 ENCOUNTER — HOSPITAL ENCOUNTER (INPATIENT)
Facility: HOSPITAL | Age: 79
LOS: 2 days | Discharge: HOME | DRG: 190 | End: 2024-03-10
Attending: EMERGENCY MEDICINE | Admitting: HOSPITALIST
Payer: MEDICARE

## 2024-03-08 DIAGNOSIS — J44.1 COPD WITH ACUTE EXACERBATION: Primary | ICD-10-CM

## 2024-03-08 DIAGNOSIS — J96.00 ACUTE RESPIRATORY FAILURE, UNSPECIFIED WHETHER WITH HYPOXIA OR HYPERCAPNIA: ICD-10-CM

## 2024-03-08 PROBLEM — I48.0 PAROXYSMAL ATRIAL FIBRILLATION: Status: ACTIVE | Noted: 2024-03-08

## 2024-03-08 PROBLEM — R63.4 WEIGHT LOSS: Status: ACTIVE | Noted: 2024-03-08

## 2024-03-08 PROBLEM — I25.10 ATHEROSCLEROTIC CORONARY VASCULAR DISEASE: Status: ACTIVE | Noted: 2024-03-08

## 2024-03-08 PROBLEM — I25.5 ISCHEMIC CARDIOMYOPATHY: Status: ACTIVE | Noted: 2024-03-08

## 2024-03-08 PROBLEM — I10 HYPERTENSION: Status: ACTIVE | Noted: 2024-03-08

## 2024-03-08 LAB
ALBUMIN SERPL-MCNC: 3.9 G/DL (ref 3.5–5.2)
ALBUMIN/GLOB SERPL: 1.3 G/DL
ALP SERPL-CCNC: 127 U/L (ref 39–117)
ALT SERPL W P-5'-P-CCNC: 31 U/L (ref 1–41)
ANION GAP SERPL CALCULATED.3IONS-SCNC: 10.2 MMOL/L (ref 5–15)
ANION GAP SERPL CALCULATED.3IONS-SCNC: 8.8 MMOL/L (ref 5–15)
ARTERIAL PATENCY WRIST A: POSITIVE
AST SERPL-CCNC: 25 U/L (ref 1–40)
ATMOSPHERIC PRESS: 749.5 MMHG
B PARAPERT DNA SPEC QL NAA+PROBE: NOT DETECTED
B PERT DNA SPEC QL NAA+PROBE: NOT DETECTED
BASE EXCESS BLDA CALC-SCNC: 1.8 MMOL/L (ref 0–2)
BASOPHILS # BLD AUTO: 0.05 10*3/MM3 (ref 0–0.2)
BASOPHILS # BLD AUTO: 0.07 10*3/MM3 (ref 0–0.2)
BASOPHILS NFR BLD AUTO: 0.4 % (ref 0–1.5)
BASOPHILS NFR BLD AUTO: 0.8 % (ref 0–1.5)
BDY SITE: ABNORMAL
BILIRUB SERPL-MCNC: 0.4 MG/DL (ref 0–1.2)
BUN SERPL-MCNC: 15 MG/DL (ref 8–23)
BUN SERPL-MCNC: 15 MG/DL (ref 8–23)
BUN/CREAT SERPL: 16.3 (ref 7–25)
BUN/CREAT SERPL: 16.3 (ref 7–25)
C PNEUM DNA NPH QL NAA+NON-PROBE: NOT DETECTED
CALCIUM SPEC-SCNC: 9.1 MG/DL (ref 8.6–10.5)
CALCIUM SPEC-SCNC: 9.3 MG/DL (ref 8.6–10.5)
CHLORIDE SERPL-SCNC: 100 MMOL/L (ref 98–107)
CHLORIDE SERPL-SCNC: 100 MMOL/L (ref 98–107)
CO2 BLDA-SCNC: 29.4 MMOL/L (ref 23–27)
CO2 SERPL-SCNC: 27.8 MMOL/L (ref 22–29)
CO2 SERPL-SCNC: 29.2 MMOL/L (ref 22–29)
CREAT SERPL-MCNC: 0.92 MG/DL (ref 0.76–1.27)
CREAT SERPL-MCNC: 0.92 MG/DL (ref 0.76–1.27)
D-LACTATE SERPL-SCNC: 0.9 MMOL/L (ref 0.5–2)
DEPRECATED RDW RBC AUTO: 39.4 FL (ref 37–54)
DEPRECATED RDW RBC AUTO: 40.7 FL (ref 37–54)
DEVICE COMMENT: ABNORMAL
EGFRCR SERPLBLD CKD-EPI 2021: 84.6 ML/MIN/1.73
EGFRCR SERPLBLD CKD-EPI 2021: 84.6 ML/MIN/1.73
EOSINOPHIL # BLD AUTO: 0.02 10*3/MM3 (ref 0–0.4)
EOSINOPHIL # BLD AUTO: 0.09 10*3/MM3 (ref 0–0.4)
EOSINOPHIL NFR BLD AUTO: 0.2 % (ref 0.3–6.2)
EOSINOPHIL NFR BLD AUTO: 1 % (ref 0.3–6.2)
ERYTHROCYTE [DISTWIDTH] IN BLOOD BY AUTOMATED COUNT: 11.8 % (ref 12.3–15.4)
ERYTHROCYTE [DISTWIDTH] IN BLOOD BY AUTOMATED COUNT: 11.8 % (ref 12.3–15.4)
FLUAV SUBTYP SPEC NAA+PROBE: NOT DETECTED
FLUBV RNA ISLT QL NAA+PROBE: NOT DETECTED
GAS FLOW AIRWAY: 2 LPM
GEN 5 2HR TROPONIN T REFLEX: 48 NG/L
GLOBULIN UR ELPH-MCNC: 2.9 GM/DL
GLUCOSE SERPL-MCNC: 113 MG/DL (ref 65–99)
GLUCOSE SERPL-MCNC: 118 MG/DL (ref 65–99)
HADV DNA SPEC NAA+PROBE: NOT DETECTED
HCO3 BLDA-SCNC: 27.9 MMOL/L (ref 22–28)
HCOV 229E RNA SPEC QL NAA+PROBE: NOT DETECTED
HCOV HKU1 RNA SPEC QL NAA+PROBE: NOT DETECTED
HCOV NL63 RNA SPEC QL NAA+PROBE: NOT DETECTED
HCOV OC43 RNA SPEC QL NAA+PROBE: NOT DETECTED
HCT VFR BLD AUTO: 42.2 % (ref 37.5–51)
HCT VFR BLD AUTO: 43.7 % (ref 37.5–51)
HEMODILUTION: NO
HGB BLD-MCNC: 14 G/DL (ref 13–17.7)
HGB BLD-MCNC: 14.2 G/DL (ref 13–17.7)
HMPV RNA NPH QL NAA+NON-PROBE: NOT DETECTED
HPIV1 RNA ISLT QL NAA+PROBE: NOT DETECTED
HPIV2 RNA SPEC QL NAA+PROBE: NOT DETECTED
HPIV3 RNA NPH QL NAA+PROBE: NOT DETECTED
HPIV4 P GENE NPH QL NAA+PROBE: NOT DETECTED
IMM GRANULOCYTES # BLD AUTO: 0.05 10*3/MM3 (ref 0–0.05)
IMM GRANULOCYTES # BLD AUTO: 0.08 10*3/MM3 (ref 0–0.05)
IMM GRANULOCYTES NFR BLD AUTO: 0.6 % (ref 0–0.5)
IMM GRANULOCYTES NFR BLD AUTO: 0.7 % (ref 0–0.5)
LYMPHOCYTES # BLD AUTO: 0.66 10*3/MM3 (ref 0.7–3.1)
LYMPHOCYTES # BLD AUTO: 1.4 10*3/MM3 (ref 0.7–3.1)
LYMPHOCYTES NFR BLD AUTO: 16.1 % (ref 19.6–45.3)
LYMPHOCYTES NFR BLD AUTO: 5.8 % (ref 19.6–45.3)
M PNEUMO IGG SER IA-ACNC: NOT DETECTED
MAGNESIUM SERPL-MCNC: 2.2 MG/DL (ref 1.6–2.4)
MCH RBC QN AUTO: 30.5 PG (ref 26.6–33)
MCH RBC QN AUTO: 30.6 PG (ref 26.6–33)
MCHC RBC AUTO-ENTMCNC: 32.5 G/DL (ref 31.5–35.7)
MCHC RBC AUTO-ENTMCNC: 33.2 G/DL (ref 31.5–35.7)
MCV RBC AUTO: 92.1 FL (ref 79–97)
MCV RBC AUTO: 94 FL (ref 79–97)
MODALITY: ABNORMAL
MONOCYTES # BLD AUTO: 0.23 10*3/MM3 (ref 0.1–0.9)
MONOCYTES # BLD AUTO: 0.71 10*3/MM3 (ref 0.1–0.9)
MONOCYTES NFR BLD AUTO: 2 % (ref 5–12)
MONOCYTES NFR BLD AUTO: 8.2 % (ref 5–12)
NEUTROPHILS NFR BLD AUTO: 10.34 10*3/MM3 (ref 1.7–7)
NEUTROPHILS NFR BLD AUTO: 6.36 10*3/MM3 (ref 1.7–7)
NEUTROPHILS NFR BLD AUTO: 73.3 % (ref 42.7–76)
NEUTROPHILS NFR BLD AUTO: 90.9 % (ref 42.7–76)
NRBC BLD AUTO-RTO: 0 /100 WBC (ref 0–0.2)
NRBC BLD AUTO-RTO: 0 /100 WBC (ref 0–0.2)
NT-PROBNP SERPL-MCNC: 813 PG/ML (ref 0–1800)
PCO2 BLDA: 48.3 MM HG (ref 35–45)
PH BLDA: 7.37 PH UNITS (ref 7.35–7.45)
PLATELET # BLD AUTO: 223 10*3/MM3 (ref 140–450)
PLATELET # BLD AUTO: 239 10*3/MM3 (ref 140–450)
PMV BLD AUTO: 9.1 FL (ref 6–12)
PMV BLD AUTO: 9.1 FL (ref 6–12)
PO2 BLDA: 104.9 MM HG (ref 80–100)
POTASSIUM SERPL-SCNC: 4.8 MMOL/L (ref 3.5–5.2)
POTASSIUM SERPL-SCNC: 4.9 MMOL/L (ref 3.5–5.2)
PROCALCITONIN SERPL-MCNC: 0.06 NG/ML (ref 0–0.25)
PROT SERPL-MCNC: 6.8 G/DL (ref 6–8.5)
QT INTERVAL: 396 MS
QTC INTERVAL: 485 MS
RBC # BLD AUTO: 4.58 10*6/MM3 (ref 4.14–5.8)
RBC # BLD AUTO: 4.65 10*6/MM3 (ref 4.14–5.8)
RHINOVIRUS RNA SPEC NAA+PROBE: NOT DETECTED
RSV RNA NPH QL NAA+NON-PROBE: NOT DETECTED
SAO2 % BLDCOA: 97.8 % (ref 92–98.5)
SARS-COV-2 RNA NPH QL NAA+NON-PROBE: NOT DETECTED
SODIUM SERPL-SCNC: 138 MMOL/L (ref 136–145)
SODIUM SERPL-SCNC: 138 MMOL/L (ref 136–145)
TROPONIN T DELTA: 1 NG/L
TROPONIN T SERPL HS-MCNC: 47 NG/L
WBC NRBC COR # BLD AUTO: 11.38 10*3/MM3 (ref 3.4–10.8)
WBC NRBC COR # BLD AUTO: 8.68 10*3/MM3 (ref 3.4–10.8)

## 2024-03-08 PROCEDURE — 25010000002 METHYLPREDNISOLONE PER 125 MG: Performed by: EMERGENCY MEDICINE

## 2024-03-08 PROCEDURE — 25810000003 SODIUM CHLORIDE 0.9 % SOLUTION 250 ML FLEX CONT: Performed by: HOSPITALIST

## 2024-03-08 PROCEDURE — 94799 UNLISTED PULMONARY SVC/PX: CPT

## 2024-03-08 PROCEDURE — 93005 ELECTROCARDIOGRAM TRACING: CPT | Performed by: EMERGENCY MEDICINE

## 2024-03-08 PROCEDURE — 83605 ASSAY OF LACTIC ACID: CPT | Performed by: EMERGENCY MEDICINE

## 2024-03-08 PROCEDURE — 25010000002 AZITHROMYCIN PER 500 MG: Performed by: HOSPITALIST

## 2024-03-08 PROCEDURE — 94760 N-INVAS EAR/PLS OXIMETRY 1: CPT

## 2024-03-08 PROCEDURE — 85025 COMPLETE CBC W/AUTO DIFF WBC: CPT | Performed by: EMERGENCY MEDICINE

## 2024-03-08 PROCEDURE — 71045 X-RAY EXAM CHEST 1 VIEW: CPT

## 2024-03-08 PROCEDURE — 97162 PT EVAL MOD COMPLEX 30 MIN: CPT

## 2024-03-08 PROCEDURE — 36415 COLL VENOUS BLD VENIPUNCTURE: CPT | Performed by: NURSE PRACTITIONER

## 2024-03-08 PROCEDURE — 84484 ASSAY OF TROPONIN QUANT: CPT | Performed by: EMERGENCY MEDICINE

## 2024-03-08 PROCEDURE — 94664 DEMO&/EVAL PT USE INHALER: CPT

## 2024-03-08 PROCEDURE — 87040 BLOOD CULTURE FOR BACTERIA: CPT | Performed by: EMERGENCY MEDICINE

## 2024-03-08 PROCEDURE — 85025 COMPLETE CBC W/AUTO DIFF WBC: CPT | Performed by: NURSE PRACTITIONER

## 2024-03-08 PROCEDURE — 94761 N-INVAS EAR/PLS OXIMETRY MLT: CPT

## 2024-03-08 PROCEDURE — 36600 WITHDRAWAL OF ARTERIAL BLOOD: CPT | Performed by: EMERGENCY MEDICINE

## 2024-03-08 PROCEDURE — 25010000002 METHYLPREDNISOLONE PER 125 MG: Performed by: NURSE PRACTITIONER

## 2024-03-08 PROCEDURE — 83880 ASSAY OF NATRIURETIC PEPTIDE: CPT | Performed by: EMERGENCY MEDICINE

## 2024-03-08 PROCEDURE — 80053 COMPREHEN METABOLIC PANEL: CPT | Performed by: EMERGENCY MEDICINE

## 2024-03-08 PROCEDURE — 93010 ELECTROCARDIOGRAM REPORT: CPT | Performed by: INTERNAL MEDICINE

## 2024-03-08 PROCEDURE — 99291 CRITICAL CARE FIRST HOUR: CPT

## 2024-03-08 PROCEDURE — 84145 PROCALCITONIN (PCT): CPT | Performed by: EMERGENCY MEDICINE

## 2024-03-08 PROCEDURE — 0202U NFCT DS 22 TRGT SARS-COV-2: CPT | Performed by: EMERGENCY MEDICINE

## 2024-03-08 PROCEDURE — 82803 BLOOD GASES ANY COMBINATION: CPT | Performed by: EMERGENCY MEDICINE

## 2024-03-08 PROCEDURE — 83735 ASSAY OF MAGNESIUM: CPT | Performed by: NURSE PRACTITIONER

## 2024-03-08 PROCEDURE — 94640 AIRWAY INHALATION TREATMENT: CPT

## 2024-03-08 RX ORDER — CHOLECALCIFEROL (VITAMIN D3) 125 MCG
1000 CAPSULE ORAL DAILY
COMMUNITY

## 2024-03-08 RX ORDER — METHYLPREDNISOLONE SODIUM SUCCINATE 125 MG/2ML
60 INJECTION, POWDER, LYOPHILIZED, FOR SOLUTION INTRAMUSCULAR; INTRAVENOUS EVERY 12 HOURS
Status: DISCONTINUED | OUTPATIENT
Start: 2024-03-08 | End: 2024-03-09

## 2024-03-08 RX ORDER — SODIUM CHLORIDE 9 MG/ML
40 INJECTION, SOLUTION INTRAVENOUS AS NEEDED
Status: DISCONTINUED | OUTPATIENT
Start: 2024-03-08 | End: 2024-03-08

## 2024-03-08 RX ORDER — IPRATROPIUM BROMIDE AND ALBUTEROL SULFATE 2.5; .5 MG/3ML; MG/3ML
3 SOLUTION RESPIRATORY (INHALATION)
Status: DISCONTINUED | OUTPATIENT
Start: 2024-03-08 | End: 2024-03-09

## 2024-03-08 RX ORDER — IPRATROPIUM BROMIDE AND ALBUTEROL SULFATE 2.5; .5 MG/3ML; MG/3ML
3 SOLUTION RESPIRATORY (INHALATION) ONCE
Status: COMPLETED | OUTPATIENT
Start: 2024-03-08 | End: 2024-03-08

## 2024-03-08 RX ORDER — ACETAMINOPHEN 160 MG/5ML
650 SOLUTION ORAL EVERY 4 HOURS PRN
Status: DISCONTINUED | OUTPATIENT
Start: 2024-03-08 | End: 2024-03-08

## 2024-03-08 RX ORDER — ALBUTEROL SULFATE 2.5 MG/3ML
2.5 SOLUTION RESPIRATORY (INHALATION) EVERY 6 HOURS PRN
Status: DISCONTINUED | OUTPATIENT
Start: 2024-03-08 | End: 2024-03-08

## 2024-03-08 RX ORDER — AMOXICILLIN 250 MG
2 CAPSULE ORAL 2 TIMES DAILY PRN
Status: DISCONTINUED | OUTPATIENT
Start: 2024-03-08 | End: 2024-03-10 | Stop reason: HOSPADM

## 2024-03-08 RX ORDER — IPRATROPIUM BROMIDE AND ALBUTEROL SULFATE 2.5; .5 MG/3ML; MG/3ML
3 SOLUTION RESPIRATORY (INHALATION)
Status: DISCONTINUED | OUTPATIENT
Start: 2024-03-08 | End: 2024-03-08

## 2024-03-08 RX ORDER — GUAIFENESIN 600 MG/1
1200 TABLET, EXTENDED RELEASE ORAL EVERY 12 HOURS SCHEDULED
Status: DISCONTINUED | OUTPATIENT
Start: 2024-03-08 | End: 2024-03-10 | Stop reason: HOSPADM

## 2024-03-08 RX ORDER — FAMOTIDINE 20 MG/1
20 TABLET, FILM COATED ORAL 2 TIMES DAILY
Status: DISCONTINUED | OUTPATIENT
Start: 2024-03-08 | End: 2024-03-10 | Stop reason: HOSPADM

## 2024-03-08 RX ORDER — NITROGLYCERIN 0.4 MG/1
0.4 TABLET SUBLINGUAL
Status: DISCONTINUED | OUTPATIENT
Start: 2024-03-08 | End: 2024-03-10 | Stop reason: HOSPADM

## 2024-03-08 RX ORDER — BISACODYL 5 MG/1
5 TABLET, DELAYED RELEASE ORAL DAILY PRN
Status: DISCONTINUED | OUTPATIENT
Start: 2024-03-08 | End: 2024-03-10 | Stop reason: HOSPADM

## 2024-03-08 RX ORDER — FOLIC ACID 1 MG/1
1 TABLET ORAL DAILY
COMMUNITY

## 2024-03-08 RX ORDER — FLUTICASONE PROPIONATE 50 MCG
2 SPRAY, SUSPENSION (ML) NASAL DAILY
Status: DISCONTINUED | OUTPATIENT
Start: 2024-03-08 | End: 2024-03-10 | Stop reason: HOSPADM

## 2024-03-08 RX ORDER — ACETAMINOPHEN 325 MG/1
650 TABLET ORAL EVERY 4 HOURS PRN
Status: DISCONTINUED | OUTPATIENT
Start: 2024-03-08 | End: 2024-03-10 | Stop reason: HOSPADM

## 2024-03-08 RX ORDER — SODIUM CHLORIDE 0.9 % (FLUSH) 0.9 %
10 SYRINGE (ML) INJECTION AS NEEDED
Status: DISCONTINUED | OUTPATIENT
Start: 2024-03-08 | End: 2024-03-08

## 2024-03-08 RX ORDER — PREDNISONE 5 MG/1
5 TABLET ORAL DAILY
Status: ON HOLD | COMMUNITY
End: 2024-03-10

## 2024-03-08 RX ORDER — CHOLECALCIFEROL (VITAMIN D3) 125 MCG
1000 CAPSULE ORAL DAILY
Status: DISCONTINUED | OUTPATIENT
Start: 2024-03-08 | End: 2024-03-10 | Stop reason: HOSPADM

## 2024-03-08 RX ORDER — IPRATROPIUM BROMIDE AND ALBUTEROL SULFATE 2.5; .5 MG/3ML; MG/3ML
3 SOLUTION RESPIRATORY (INHALATION)
Status: DISCONTINUED | OUTPATIENT
Start: 2024-03-08 | End: 2024-03-10 | Stop reason: HOSPADM

## 2024-03-08 RX ORDER — POLYETHYLENE GLYCOL 3350 17 G/17G
17 POWDER, FOR SOLUTION ORAL DAILY PRN
Status: DISCONTINUED | OUTPATIENT
Start: 2024-03-08 | End: 2024-03-10 | Stop reason: HOSPADM

## 2024-03-08 RX ORDER — BISACODYL 10 MG
10 SUPPOSITORY, RECTAL RECTAL DAILY PRN
Status: DISCONTINUED | OUTPATIENT
Start: 2024-03-08 | End: 2024-03-10 | Stop reason: HOSPADM

## 2024-03-08 RX ORDER — BUDESONIDE AND FORMOTEROL FUMARATE DIHYDRATE 160; 4.5 UG/1; UG/1
2 AEROSOL RESPIRATORY (INHALATION)
Status: DISCONTINUED | OUTPATIENT
Start: 2024-03-08 | End: 2024-03-10 | Stop reason: HOSPADM

## 2024-03-08 RX ORDER — SODIUM CHLORIDE 0.9 % (FLUSH) 0.9 %
10 SYRINGE (ML) INJECTION EVERY 12 HOURS SCHEDULED
Status: DISCONTINUED | OUTPATIENT
Start: 2024-03-08 | End: 2024-03-08

## 2024-03-08 RX ORDER — ACETAMINOPHEN 650 MG/1
650 SUPPOSITORY RECTAL EVERY 4 HOURS PRN
Status: DISCONTINUED | OUTPATIENT
Start: 2024-03-08 | End: 2024-03-08

## 2024-03-08 RX ORDER — ERGOCALCIFEROL 1.25 MG/1
50000 CAPSULE ORAL WEEKLY
COMMUNITY

## 2024-03-08 RX ORDER — METHYLPREDNISOLONE SODIUM SUCCINATE 125 MG/2ML
125 INJECTION, POWDER, LYOPHILIZED, FOR SOLUTION INTRAMUSCULAR; INTRAVENOUS ONCE
Status: COMPLETED | OUTPATIENT
Start: 2024-03-08 | End: 2024-03-08

## 2024-03-08 RX ORDER — FOLIC ACID 1 MG/1
1 TABLET ORAL DAILY
Status: DISCONTINUED | OUTPATIENT
Start: 2024-03-08 | End: 2024-03-10 | Stop reason: HOSPADM

## 2024-03-08 RX ORDER — ONDANSETRON 2 MG/ML
4 INJECTION INTRAMUSCULAR; INTRAVENOUS EVERY 6 HOURS PRN
Status: DISCONTINUED | OUTPATIENT
Start: 2024-03-08 | End: 2024-03-10 | Stop reason: HOSPADM

## 2024-03-08 RX ORDER — ATORVASTATIN CALCIUM 80 MG/1
80 TABLET, FILM COATED ORAL NIGHTLY
Status: DISCONTINUED | OUTPATIENT
Start: 2024-03-08 | End: 2024-03-10 | Stop reason: HOSPADM

## 2024-03-08 RX ADMIN — METOPROLOL TARTRATE 75 MG: 25 TABLET, FILM COATED ORAL at 20:27

## 2024-03-08 RX ADMIN — FAMOTIDINE 20 MG: 20 TABLET, FILM COATED ORAL at 20:20

## 2024-03-08 RX ADMIN — IPRATROPIUM BROMIDE AND ALBUTEROL SULFATE 3 ML: .5; 3 SOLUTION RESPIRATORY (INHALATION) at 16:41

## 2024-03-08 RX ADMIN — IPRATROPIUM BROMIDE AND ALBUTEROL SULFATE 3 ML: .5; 3 SOLUTION RESPIRATORY (INHALATION) at 03:50

## 2024-03-08 RX ADMIN — IPRATROPIUM BROMIDE AND ALBUTEROL SULFATE 3 ML: .5; 3 SOLUTION RESPIRATORY (INHALATION) at 22:47

## 2024-03-08 RX ADMIN — METHYLPREDNISOLONE SODIUM SUCCINATE 60 MG: 125 INJECTION, POWDER, FOR SOLUTION INTRAMUSCULAR; INTRAVENOUS at 11:56

## 2024-03-08 RX ADMIN — ATORVASTATIN CALCIUM 80 MG: 80 TABLET, FILM COATED ORAL at 20:20

## 2024-03-08 RX ADMIN — AZITHROMYCIN MONOHYDRATE 500 MG: 500 INJECTION, POWDER, LYOPHILIZED, FOR SOLUTION INTRAVENOUS at 20:21

## 2024-03-08 RX ADMIN — METHYLPREDNISOLONE SODIUM SUCCINATE 125 MG: 125 INJECTION, POWDER, FOR SOLUTION INTRAMUSCULAR; INTRAVENOUS at 04:10

## 2024-03-08 RX ADMIN — IPRATROPIUM BROMIDE AND ALBUTEROL SULFATE 3 ML: .5; 3 SOLUTION RESPIRATORY (INHALATION) at 13:55

## 2024-03-08 RX ADMIN — APIXABAN 5 MG: 5 TABLET, FILM COATED ORAL at 20:20

## 2024-03-08 RX ADMIN — METOPROLOL TARTRATE 75 MG: 25 TABLET, FILM COATED ORAL at 10:24

## 2024-03-08 RX ADMIN — APIXABAN 5 MG: 5 TABLET, FILM COATED ORAL at 10:24

## 2024-03-08 RX ADMIN — GUAIFENESIN 1200 MG: 600 TABLET, EXTENDED RELEASE ORAL at 20:20

## 2024-03-08 RX ADMIN — FOLIC ACID 1 MG: 1 TABLET ORAL at 10:28

## 2024-03-08 RX ADMIN — IPRATROPIUM BROMIDE AND ALBUTEROL SULFATE 3 ML: .5; 3 SOLUTION RESPIRATORY (INHALATION) at 07:37

## 2024-03-08 RX ADMIN — FAMOTIDINE 20 MG: 20 TABLET, FILM COATED ORAL at 10:24

## 2024-03-08 RX ADMIN — IPRATROPIUM BROMIDE AND ALBUTEROL SULFATE 3 ML: .5; 3 SOLUTION RESPIRATORY (INHALATION) at 20:00

## 2024-03-08 RX ADMIN — GUAIFENESIN 1200 MG: 600 TABLET, EXTENDED RELEASE ORAL at 08:08

## 2024-03-08 RX ADMIN — ACETAMINOPHEN 325MG 650 MG: 325 TABLET ORAL at 08:08

## 2024-03-08 RX ADMIN — Medication 1000 MCG: at 10:28

## 2024-03-08 RX ADMIN — IPRATROPIUM BROMIDE AND ALBUTEROL SULFATE 3 ML: .5; 3 SOLUTION RESPIRATORY (INHALATION) at 11:42

## 2024-03-08 RX ADMIN — FLUTICASONE PROPIONATE 2 SPRAY: 50 SPRAY, METERED NASAL at 11:56

## 2024-03-08 RX ADMIN — IPRATROPIUM BROMIDE AND ALBUTEROL SULFATE 3 ML: .5; 3 SOLUTION RESPIRATORY (INHALATION) at 09:30

## 2024-03-08 RX ADMIN — BUDESONIDE AND FORMOTEROL FUMARATE DIHYDRATE 2 PUFF: 160; 4.5 AEROSOL RESPIRATORY (INHALATION) at 20:05

## 2024-03-08 NOTE — CONSULTS
Patient Identification:  Wisam Malone  78 y.o.  male  1945  [id number]          LOS 0    Requesting physician:   Jose Cruz Olson MD    Reason for Consultation:    COPD    History of Present Illness:   78-year-old male with a history of chronic obstructive pulmonary disease, gastroesophageal reflux disease, hypertension who presented with shortness of breath.    Patient states that has been having worsening shortness of breath for the past several days.  Cough persist however this is unchanged from previously.  Continues to have sputum production.  Denies any recent pulmonary infection.  Former smoker, 1 to 1.5 packs/day for over 50 years.  Quit approximately 2 years ago.  [facility].  Is on prednisone 5 mg daily and azithromycin for COPD.  States that since coming to the hospital has noticed that his breathing is improved.  On 1 L nasal cannula.    Past Medical History:  Past Medical History:   Diagnosis Date    COPD (chronic obstructive pulmonary disease)     Elevated cholesterol     GERD (gastroesophageal reflux disease)     Hypertension        Past Surgical History:  Past Surgical History:   Procedure Laterality Date    CORONARY ARTERY BYPASS GRAFT  1996        Home Meds:  Medications Prior to Admission   Medication Sig Dispense Refill Last Dose    albuterol sulfate  (90 Base) MCG/ACT inhaler Inhale 2 puffs Every 4 (Four) Hours As Needed for Wheezing.   3/7/2024    apixaban (ELIQUIS) 5 MG tablet tablet Take 1 tablet by mouth Every 12 (Twelve) Hours.   3/7/2024    atorvastatin (LIPITOR) 80 MG tablet Take 1 tablet by mouth Daily.   3/7/2024    dextromethorphan-guaifenesin (ROBITUSSIN-DM)  MG/5ML syrup Take 5 mL by mouth Every 6 (Six) Hours As Needed.   3/7/2024    famotidine (PEPCID) 20 MG tablet Take 1 tablet by mouth 2 (Two) Times a Day.   3/7/2024    fluticasone (FLONASE) 50 MCG/ACT nasal spray 2 sprays into the nostril(s) as directed by provider Daily.    "3/7/2024    folic acid (FOLVITE) 1 MG tablet Take 1 tablet by mouth Daily.   3/7/2024    guaiFENesin (MUCINEX) 600 MG 12 hr tablet Take 2 tablets by mouth 2 (Two) Times a Day.   3/7/2024    ipratropium-albuterol (COMBIVENT RESPIMAT)  MCG/ACT inhaler Inhale 1 puff 4 (Four) Times a Day As Needed for Wheezing.   3/7/2024    metoprolol tartrate (LOPRESSOR) 50 MG tablet Take 1.5 tablets by mouth 2 (Two) Times a Day.   3/7/2024    predniSONE (DELTASONE) 5 MG tablet Take 1 tablet by mouth Daily.   Past Month    tiotropium bromide monohydrate (SPIRIVA RESPIMAT) 2.5 MCG/ACT aerosol solution inhaler Inhale 2 puffs Daily.   3/7/2024    vitamin B-12 (CYANOCOBALAMIN) 500 MCG tablet Take 2 tablets by mouth Daily.   Past Week    vitamin D (ERGOCALCIFEROL) 1.25 MG (71506 UT) capsule capsule Take 1 capsule by mouth 1 (One) Time Per Week.   Past Week    aspirin 81 MG EC tablet Take 1 tablet by mouth Daily.   More than a month    polyethylene glycol (MIRALAX) 17 g packet Take 17 g by mouth Daily.   More than a month    sacubitril-valsartan (ENTRESTO) 24-26 MG tablet Take 1 tablet by mouth 2 (Two) Times a Day.   More than a month         Allergies:  No Known Allergies    Social History:   Social History     Socioeconomic History    Marital status:    Tobacco Use    Smoking status: Former     Average packs/day: 1 pack/day for 62.0 years (62.0 ttl pk-yrs)     Types: Cigarettes     Start date: 1960    Smokeless tobacco: Never   Vaping Use    Vaping status: Never Used   Substance and Sexual Activity    Drug use: Never       Family History:  History reviewed. No pertinent family history.    Review of Systems:  12 point review of systems performed and all else negative except as per HPI above.    Objective:    PHYSICAL EXAM:    /57 (BP Location: Right arm, Patient Position: Lying)   Pulse 83   Temp 98.3 °F (36.8 °C) (Oral)   Resp 18   Ht 182.9 cm (72\")   Wt 67.9 kg (149 lb 9.6 oz)   SpO2 97%   BMI 20.29 kg/m²  Body " mass index is 20.29 kg/m². 97% 67.9 kg (149 lb 9.6 oz)    General: Alert, nontoxic, NAD  HEENT: NC/AT, EOMI, MMM  Neck: Supple, trachea midline  Cardiac: RRR, no murmur, gallops, rubs  Pulmonary: Diffuse wheezing bilaterally  GI: Soft, non-tender, non-distended, normal bowel sounds  Extremities: Warm, well perfused, no LE edema  Skin: no visible rash  Neuro: CN II - XII grossly intact  Psychiatry: Normal mood and affect    Lab Review:   Results from last 7 days   Lab Units 03/08/24  0708 03/08/24  0340   WBC 10*3/mm3 11.38* 8.68   HEMOGLOBIN g/dL 14.0 14.2   PLATELETS 10*3/mm3 223 239     Results from last 7 days   Lab Units 03/08/24  0708 03/08/24  0340   SODIUM mmol/L 138 138   POTASSIUM mmol/L 4.9 4.8   CHLORIDE mmol/L 100 100   CO2 mmol/L 29.2* 27.8   BUN mg/dL 15 15   CREATININE mg/dL 0.92 0.92   GLUCOSE mg/dL 113* 118*   CALCIUM mg/dL 9.3 9.1   MAGNESIUM mg/dL 2.2  --    Estimated Creatinine Clearance: 62.5 mL/min (by C-G formula based on SCr of 0.92 mg/dL).    Results from last 7 days   Lab Units 03/08/24  0708 03/08/24  0340   AST (SGOT) U/L  --  25   ALT (SGPT) U/L  --  31   PROCALCITONIN ng/mL  --  0.06   LACTATE mmol/L  --  0.9   PLATELETS 10*3/mm3 223 239       Results from last 7 days   Lab Units 03/08/24  0346   PH, ARTERIAL pH units 7.370   PCO2, ARTERIAL mm Hg 48.3*   PO2 ART mm Hg 104.9*   HCO3 ART mmol/L 27.9        Imaging reviewed  Chest imaging from this hospitalization reviewed.       Assessment / Recommendations:    Acute exacerbation of chronic obstructive pulmonary disease  Acute hypoxic respiratory failure  Chronic hypercapnic respiratory failure  Metabolic alkalosis  Leukocytosis  Coronary artery disease  Hypertension  Ischemic cardiomyopathy  Atrial fibrillation    -Patient presents with COPD exacerbation.  [facility]  -Significant wheezing still present on evaluation.  Will continue Solu-Medrol, transition to prednisone once symptoms improved.  -Will  increase DuoNebs to every 4 hours from every 6 hours.  States that frequency is not enough.  Discontinue Spiriva due to patient being on frequent DuoNebs.  Start Symbicort.  -Will treat with azithromycin for 3 days.  -On 1 L nasal cannula, weaned to room air with appropriate saturations.  Goal SpO2 88 to 92%.    Thank you for allowing me to participate in the care of this patient.  I will continue to follow along with you.    Carlos Donovan MD  [facility], [facility]  Pulmonary and Critical Care Medicine, Interventional Pulmonology    3/8/2024  17:48 EST    Parts of this note may be an electronic transcription/translation of spoken language to printed text using the Dragon dictation system.

## 2024-03-08 NOTE — ED PROVIDER NOTES
EMERGENCY DEPARTMENT ENCOUNTER    Room Number:  15/15  PCP: System, Provider Not In  Historian: Patient/EMS report      HPI:  Chief Complaint: Shortness of breath  A complete HPI/ROS/PMH/PSH/SH/FH are unobtainable due to: None  Context: Wisam Malone is a 78 y.o. male who presents to the ED c/o fairly sudden onset of severe shortness of breath beginning just prior to ED arrival today.  The patient has had mild episodes of shortness of breath over the last couple of days that significantly worsened tonight.  He does report that he is on a home BiPAP machine at night with the ongoing shortness of breath.  He denies chest pain, fever/chills, cough, back pain, or nausea/vomiting.  EMS placed him on CPAP and gave him a duo nebulizer treatment in route with some improvement in symptoms.            PAST MEDICAL HISTORY  Active Ambulatory Problems     Diagnosis Date Noted    No Active Ambulatory Problems     Resolved Ambulatory Problems     Diagnosis Date Noted    Septic shock 11/17/2023     No Additional Past Medical History         PAST SURGICAL HISTORY  No past surgical history on file.      FAMILY HISTORY  No family history on file.      SOCIAL HISTORY  Social History     Socioeconomic History    Marital status:    Tobacco Use    Smoking status: Former     Types: Cigarettes    Smokeless tobacco: Never   Vaping Use    Vaping status: Never Used   Substance and Sexual Activity    Drug use: Never         ALLERGIES  Patient has no known allergies.        REVIEW OF SYSTEMS  Review of Systems   Constitutional:  Negative for activity change, appetite change and fever.   HENT:  Negative for congestion and sore throat.    Eyes: Negative.    Respiratory:  Positive for shortness of breath. Negative for cough.    Cardiovascular:  Negative for chest pain and leg swelling.   Gastrointestinal:  Negative for abdominal pain, diarrhea and vomiting.   Endocrine: Negative.    Genitourinary:  Negative for decreased urine volume and  dysuria.   Musculoskeletal:  Negative for neck pain.   Skin:  Negative for rash and wound.   Allergic/Immunologic: Negative.    Neurological:  Negative for weakness, numbness and headaches.   Hematological: Negative.    Psychiatric/Behavioral: Negative.     All other systems reviewed and are negative.         PHYSICAL EXAM  ED Triage Vitals   Temp Heart Rate Resp BP SpO2   -- 03/08/24 0338 03/08/24 0339 03/08/24 0338 03/08/24 0338    84 24 (!) 125/109 100 %      Temp src Heart Rate Source Patient Position BP Location FiO2 (%)   -- 03/08/24 0338 03/08/24 0338 03/08/24 0338 --    Monitor Lying Right arm        Physical Exam  Constitutional:       General: He is in acute distress.      Appearance: Normal appearance. He is not ill-appearing or toxic-appearing.   HENT:      Head: Normocephalic and atraumatic.   Eyes:      Extraocular Movements: Extraocular movements intact.      Pupils: Pupils are equal, round, and reactive to light.   Cardiovascular:      Rate and Rhythm: Normal rate and regular rhythm.      Heart sounds: No murmur heard.     No friction rub. No gallop.   Pulmonary:      Effort: Tachypnea and respiratory distress present.      Breath sounds: Wheezing and rales present.   Abdominal:      General: Abdomen is flat. There is no distension.      Palpations: Abdomen is soft.      Tenderness: There is no abdominal tenderness.   Musculoskeletal:         General: No swelling or tenderness. Normal range of motion.      Cervical back: Normal range of motion and neck supple.   Skin:     General: Skin is warm and dry.   Neurological:      General: No focal deficit present.      Mental Status: He is alert and oriented to person, place, and time.      Sensory: No sensory deficit.      Motor: No weakness.   Psychiatric:         Mood and Affect: Mood normal.         Behavior: Behavior normal.           Vital signs and nursing notes reviewed.          LAB RESULTS  Recent Results (from the past 24 hour(s))   Comprehensive  Metabolic Panel    Collection Time: 03/08/24  3:40 AM    Specimen: Blood   Result Value Ref Range    Glucose 118 (H) 65 - 99 mg/dL    BUN 15 8 - 23 mg/dL    Creatinine 0.92 0.76 - 1.27 mg/dL    Sodium 138 136 - 145 mmol/L    Potassium 4.8 3.5 - 5.2 mmol/L    Chloride 100 98 - 107 mmol/L    CO2 27.8 22.0 - 29.0 mmol/L    Calcium 9.1 8.6 - 10.5 mg/dL    Total Protein 6.8 6.0 - 8.5 g/dL    Albumin 3.9 3.5 - 5.2 g/dL    ALT (SGPT) 31 1 - 41 U/L    AST (SGOT) 25 1 - 40 U/L    Alkaline Phosphatase 127 (H) 39 - 117 U/L    Total Bilirubin 0.4 0.0 - 1.2 mg/dL    Globulin 2.9 gm/dL    A/G Ratio 1.3 g/dL    BUN/Creatinine Ratio 16.3 7.0 - 25.0    Anion Gap 10.2 5.0 - 15.0 mmol/L    eGFR 84.6 >60.0 mL/min/1.73   BNP    Collection Time: 03/08/24  3:40 AM    Specimen: Blood   Result Value Ref Range    proBNP 813.0 0.0 - 1,800.0 pg/mL   High Sensitivity Troponin T    Collection Time: 03/08/24  3:40 AM    Specimen: Blood   Result Value Ref Range    HS Troponin T 47 (H) <22 ng/L   Lactic Acid, Plasma    Collection Time: 03/08/24  3:40 AM    Specimen: Blood   Result Value Ref Range    Lactate 0.9 0.5 - 2.0 mmol/L   Procalcitonin    Collection Time: 03/08/24  3:40 AM    Specimen: Blood   Result Value Ref Range    Procalcitonin 0.06 0.00 - 0.25 ng/mL   CBC Auto Differential    Collection Time: 03/08/24  3:40 AM    Specimen: Blood   Result Value Ref Range    WBC 8.68 3.40 - 10.80 10*3/mm3    RBC 4.65 4.14 - 5.80 10*6/mm3    Hemoglobin 14.2 13.0 - 17.7 g/dL    Hematocrit 43.7 37.5 - 51.0 %    MCV 94.0 79.0 - 97.0 fL    MCH 30.5 26.6 - 33.0 pg    MCHC 32.5 31.5 - 35.7 g/dL    RDW 11.8 (L) 12.3 - 15.4 %    RDW-SD 40.7 37.0 - 54.0 fl    MPV 9.1 6.0 - 12.0 fL    Platelets 239 140 - 450 10*3/mm3    Neutrophil % 73.3 42.7 - 76.0 %    Lymphocyte % 16.1 (L) 19.6 - 45.3 %    Monocyte % 8.2 5.0 - 12.0 %    Eosinophil % 1.0 0.3 - 6.2 %    Basophil % 0.8 0.0 - 1.5 %    Immature Grans % 0.6 (H) 0.0 - 0.5 %    Neutrophils, Absolute 6.36 1.70 - 7.00  10*3/mm3    Lymphocytes, Absolute 1.40 0.70 - 3.10 10*3/mm3    Monocytes, Absolute 0.71 0.10 - 0.90 10*3/mm3    Eosinophils, Absolute 0.09 0.00 - 0.40 10*3/mm3    Basophils, Absolute 0.07 0.00 - 0.20 10*3/mm3    Immature Grans, Absolute 0.05 0.00 - 0.05 10*3/mm3    nRBC 0.0 0.0 - 0.2 /100 WBC   ECG 12 Lead Dyspnea    Collection Time: 03/08/24  3:40 AM   Result Value Ref Range    QT Interval 396 ms    QTC Interval 485 ms   Blood Gas, Arterial -    Collection Time: 03/08/24  3:46 AM    Specimen: Arterial Blood   Result Value Ref Range    Site Right Radial     Paul's Test Positive     pH, Arterial 7.370 7.350 - 7.450 pH units    pCO2, Arterial 48.3 (H) 35.0 - 45.0 mm Hg    pO2, Arterial 104.9 (H) 80.0 - 100.0 mm Hg    HCO3, Arterial 27.9 22.0 - 28.0 mmol/L    Base Excess, Arterial 1.8 0.0 - 2.0 mmol/L    O2 Saturation, Arterial 97.8 92.0 - 98.5 %    CO2 Content 29.4 (H) 23 - 27 mmol/L    Barometric Pressure for Blood Gas 749.5000 mmHg    Modality Cannula     Flow Rate 2.0000 lpm    Hemodilution No     Device Comment sat 100%    Respiratory Panel PCR w/COVID-19(SARS-CoV-2) [facility]AL/JAMI/MENA/[facility] In-House, [facility] Swab [facility], 2 HR TAT - Swab, Nasopharynx    Collection Time: 03/08/24  4:14 AM    Specimen: Nasopharynx; Swab   Result Value Ref Range    ADENOVIRUS, PCR Not Detected Not Detected    Coronavirus 229E Not Detected Not Detected    Coronavirus HKU1 Not Detected Not Detected    Coronavirus NL63 Not Detected Not Detected    Coronavirus OC43 Not Detected Not Detected    COVID19 Not Detected Not Detected - Ref. Range    Human Metapneumovirus Not Detected Not Detected    Human Rhinovirus/Enterovirus Not Detected Not Detected    Influenza A PCR Not Detected Not Detected    Influenza B PCR Not Detected Not Detected    Parainfluenza Virus 1 Not Detected Not Detected    Parainfluenza Virus 2 Not Detected Not Detected    Parainfluenza Virus 3 Not Detected Not Detected    Parainfluenza Virus 4 Not Detected Not Detected     RSV, PCR Not Detected Not Detected    Bordetella pertussis pcr Not Detected Not Detected    Bordetella parapertussis PCR Not Detected Not Detected    Chlamydophila pneumoniae PCR Not Detected Not Detected    Mycoplasma pneumo by PCR Not Detected Not Detected       Ordered the above labs and reviewed the results.        RADIOLOGY  XR Chest 1 View    Result Date: 3/8/2024  SINGLE VIEW OF THE CHEST  HISTORY: Shortness of air  COMPARISON: November 16, 2023  FINDINGS: Heart size is within normal limits. 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 No pneumothorax, pleural effusion, or acute infiltrate is seen. Blunting of the right costophrenic angle is stable. Background emphysematous changes are noted.      No acute findings.  This report was finalized on 3/8/2024 4:20 AM by Dr. Rosa Sanders M.D on Workstation: [id number]       Ordered the above noted radiological studies. Reviewed by me in PACS.            PROCEDURES  Critical Care    Performed by: Lalo Salgado MD  Authorized by: Lalo Salgado MD    Critical care provider statement:     Critical care time (minutes):  33    Critical care time was exclusive of:  Separately billable procedures and treating other patients    Critical care was necessary to treat or prevent imminent or life-threatening deterioration of the following conditions:  Respiratory failure    Critical care was time spent personally by me on the following activities:  Blood draw for specimens, development of treatment plan with patient or surrogate, discussions with consultants, evaluation of patient's response to treatment, examination of patient, obtaining history from patient or surrogate, ordering and performing treatments and interventions, ordering and review of laboratory studies, ordering and review of radiographic studies, pulse oximetry, re-evaluation of patient's condition and review of old charts    Care discussed with: admitting  provider        EKG independently interpreted by myself as follows:    EKG          EKG time: 0340  Rhythm/Rate: NSR with PVC, 90  P waves and CO: nml  QRS, axis: nml, LAD   ST and T waves: Normal ST segments, lateral T wave inversions    Interpreted Contemporaneously by me, independently viewed  unchanged compared to prior 11/17/23            MEDICATIONS GIVEN IN ER  Medications   sodium chloride 0.9 % flush 10 mL (has no administration in time range)   sodium chloride 0.9 % flush 10 mL (has no administration in time range)   sodium chloride 0.9 % flush 10 mL (has no administration in time range)   sodium chloride 0.9 % infusion 40 mL (has no administration in time range)   nitroglycerin (NITROSTAT) SL tablet 0.4 mg (has no administration in time range)   acetaminophen (TYLENOL) tablet 650 mg (has no administration in time range)     Or   acetaminophen (TYLENOL) 160 MG/5ML oral solution 650 mg (has no administration in time range)     Or   acetaminophen (TYLENOL) suppository 650 mg (has no administration in time range)   sennosides-docusate (PERICOLACE) 8.6-50 MG per tablet 2 tablet (has no administration in time range)     And   polyethylene glycol (MIRALAX) packet 17 g (has no administration in time range)     And   bisacodyl (DULCOLAX) EC tablet 5 mg (has no administration in time range)     And   bisacodyl (DULCOLAX) suppository 10 mg (has no administration in time range)   ondansetron (ZOFRAN) injection 4 mg (has no administration in time range)   ipratropium-albuterol (DUO-NEB) nebulizer solution 3 mL (has no administration in time range)   albuterol (PROVENTIL) nebulizer solution 0.083% 2.5 mg/3mL (has no administration in time range)   methylPREDNISolone sodium succinate (SOLU-Medrol) injection 60 mg (has no administration in time range)   ipratropium-albuterol (DUO-NEB) nebulizer solution 3 mL (3 mL Nebulization Given 3/8/24 0350)   methylPREDNISolone sodium succinate (SOLU-Medrol) injection 125 mg (125 mg  Intravenous Given 3/8/24 7065)                   MEDICAL DECISION MAKING, PROGRESS, and CONSULTS    All labs have been independently reviewed by me.  All radiology studies have been reviewed by me and I have also reviewed the radiology report.   EKG's independently viewed and interpreted by me.  Discussion below represents my analysis of pertinent findings related to patient's condition, differential diagnosis, treatment plan and final disposition.      Additional sources:  - Discussed/ obtained information from independent historians: History obtained from the patient as well as the EMS report.    - External (non-ED) record review: Upon medical records review, the patient was admitted to the hospital last from 11/16/2023 through 11/20/2023 secondary to hypoxemic respiratory failure with COPD and acute exacerbation.    - Chronic or social conditions impacting care: COPD    - Shared decision making: Admission decision based on shared conversations have between myself, the patient at bedside, as well as ZAIDA Hernandez for A.      Orders placed during this visit:  Orders Placed This Encounter   Procedures    Critical Care    Respiratory Panel PCR w/COVID-19(SARS-CoV-2) [facility]/JAMI/MENA/[facility] In-House, NP Swab in UTM/VTM, 2 HR TAT - Swab, Nasopharynx    Blood Culture - Blood,    Blood Culture - Blood,    XR Chest 1 View    Comprehensive Metabolic Panel    BNP    High Sensitivity Troponin T    Lactic Acid, Plasma    Procalcitonin    CBC Auto Differential    Blood Gas, Arterial -    High Sensitivity Troponin T 2Hr    Basic Metabolic Panel    CBC Auto Differential    Magnesium    Diet: Cardiac; Healthy Heart (2-3 Na+); Fluid Consistency: Thin (IDDSI 0)    Vital Signs    Intake & Output    Weigh Patient    Oral Care    Place Sequential Compression Device    Maintain Sequential Compression Device    Telemetry - Maintain IV Access    Telemetry - Place Orders & Notify Provider of Results When Patient Experiences  Acute Chest Pain, Dysrhythmia or Respiratory Distress    May Be Off Telemetry for Tests    Pulse Oximetry, Continuous    Code Status and Medical Interventions:    LHA (on-call MD unless specified) Details    ECG 12 Lead Dyspnea    Insert Peripheral IV    Insert Peripheral IV    Inpatient Admission    CBC & Differential             Differential diagnosis includes but is not limited to:    Acute hypoxemic/hypercapnic respiratory failure, COPD with acute exacerbation, CHF with acute exacerbation, pneumonia, COVID-19, pulmonary edema, acute coronary syndrome, or pulmonary embolism      Independent interpretation of labs, radiology studies, and discussions with consultants:    Chest x-ray was independently interpreted by myself with my interpretation showing no cardiomegaly nor area of edema, infiltrate, or pneumothorax.      ED Course as of 03/08/24 0525   Fri Mar 08, 2024   0336 On my initial examination, the patient is in moderate to severe respiratory distress however EMS reports that he looks substantially better than he did initially after a duo nebulizer treatment.  We will take the patient off CPAP and place him on high flow oxygen by nasal cannula.  We will treat the COPD exacerbation as we begin his workup. [BM]   0430 On reevaluation, the patient does appear to be breathing significantly better and not in nearly as much distress as he was initially.  He does continue to be significantly wheezy throughout all lung fields however.  We have been able to titrate him down to 2 L of oxygen by nasal cannula.  I informed him that we would be admitting him to the hospital for COPD management.  He is in agreement with that plan and all questions have been answered. [BM]   0518 The patient's presentation, workup, as well as diagnosis and treatment plan was discussed at length with ZAIDA Hernandez for [facility].  She agrees to admit the patient to the hospital today for Dr. Srivastava. [BM]      ED Course User Index  [BM]  Lalo Salgado MD           DIAGNOSIS  Final diagnoses:   COPD with acute exacerbation   Acute respiratory failure, unspecified whether with hypoxia or hypercapnia         DISPOSITION  ADMISSION    Discussed treatment plan and reason for admission with pt/family and admitting physician.  Pt/family voiced understanding of the plan for admission for further testing/treatment as needed.               Latest Documented Vital Signs:  As of 05:25 EST  BP- 126/83 HR- 89 Temp- 97.5 °F (36.4 °C) (Oral) O2 sat- 99%              --    Please note that portions of this were completed with a voice recognition program.       Note Disclaimer: At [facility], we believe that sharing information builds trust and better relationships. You are receiving this note because you are receiving care at [facility] or recently visited. It is possible you will see health information before a provider has talked with you about it. This kind of information can be easy to misunderstand. To help you fully understand what it means for your health, we urge you to discuss this note with your provider.             Lalo Salgado MD  03/08/24 7404

## 2024-03-08 NOTE — PLAN OF CARE
Goal Outcome Evaluation:  Plan of Care Reviewed With: patient              Patient is a 78 y.o. male admitted to [facility] for COPD with acute exacerbation on 3/8/2024. PMHx includes COPD. Patient is ind at baseline with the use rwx and reports he walks about 30ft at baseline and wears 2L O2. He lives with his spouse and has 2 SHRAVAN. Today, patient performed bed mobility with SBA, required CGA for transfers, and took a few sidesteps along EOB requiring CGA. Pt is unsteady and reports difficulty breathing during all mobility. Pt O2 >95% throughout session and educated to slow breathing and PLB. RT at bedside at end of session.  Balance, activity tolerance, strength deficits noted. Patient may benefit from skilled PT services to address functional deficits and improve level of independence prior to discharge. Anticipate home with assist and HHPT vs SNF upon DC.      Anticipated Discharge Disposition (PT): home with home health, home with assist, skilled nursing facility (pending progress; likely home at dc with HHPT)

## 2024-03-08 NOTE — CASE MANAGEMENT/SOCIAL WORK
Discharge Planning Assessment  [facility]     Patient Name: Wisam Malone  MRN: [id number]  Today's Date: 3/8/2024    Admit Date: 3/8/2024    Plan: [facility] with family [facility]   Discharge Needs Assessment       Row Name 03/08/24 1522       Living Environment    People in Home spouse;child(maria m), adult;other (see comments)  daughter in law    Current Living Arrangements home    Potentially Unsafe Housing Conditions none    Primary Care Provided by self    Provides Primary Care For no one    Family Caregiver if Needed spouse;grandchild(maria m), adult    Quality of Family Relationships helpful;involved;supportive    Able to Return to Prior Arrangements yes       Resource/Environmental Concerns    Resource/Environmental Concerns none    Transportation Concerns none       Transition Planning    Patient/Family Anticipates Transition to home with family    Patient/Family Anticipated Services at Transition none    Transportation Anticipated family or friend will provide       Discharge Needs Assessment    Readmission Within the Last 30 Days no previous admission in last 30 days    Equipment Currently Used at Home walker, standard;bipap;nebulizer    Concerns to be Addressed denies needs/concerns at this time    Anticipated Changes Related to Illness none    Equipment Needed After Discharge none    Provided Post Acute Provider List? N/A    Provided Post Acute Provider Quality & Resource List? N/A                   Discharge Plan       Row Name 03/08/24 1523       Plan    Plan Home with family and [facility]    Patient/Family in Agreement with Plan yes    Plan Comments CCP met with patient at bedside. Introduced self and explained role of CCP. Patient confirmed the information on his face sheet is accurate. Patient does not have a PCP. CCP discussed with him if he would like help setting one up through [facility], [place] and he declined. Patient is enrolled into [employer]. Patient lives at home with spouse, son,  and daughter in law. Patient sates he get [facility]. [facility]. Patient is a part of Home-Based Primary Care. Patient uses a walker, O2 Machine, and BiPap. Patient plans home at discharge with HH and family to transport.                  Continued Care and Services - Admitted Since 3/8/2024    No active coordination exists for this encounter.          Demographic Summary       Row Name 03/08/24 1522       General Information    Admission Type inpatient    Arrived From emergency department    Referral Source admission list    Reason for Consult discharge planning    Preferred Language English                   Functional Status       Row Name 03/08/24 1522       Functional Status    Usual Activity Tolerance moderate    Current Activity Tolerance moderate       Functional Status, IADL    Medications independent    Meal Preparation independent    Housekeeping independent    Laundry independent    Shopping independent       Mental Status Summary    Recent Changes in Mental Status/Cognitive Functioning no changes       Employment/    Employment Status retired                   Psychosocial    No documentation.                  Abuse/Neglect    No documentation.                  Legal    No documentation.                  Substance Abuse    No documentation.                  Patient Forms    No documentation.

## 2024-03-08 NOTE — ED NOTES
Nursing report ED to floor  Wisam Malone  78 y.o.  male    HPI :  HPI (Adult)  Stated Reason for Visit: SOB  History Obtained From: EMS  Medications/Treatments Administered by EMS Prior to Presentation: nebulizer    Wisam Malone is a 78 y.o. male who presents to the ED c/o fairly sudden onset of severe shortness of breath beginning just prior to ED arrival today.  The patient has had mild episodes of shortness of breath over the last couple of days that significantly worsened tonight.  He does report that he is on a home BiPAP machine at night with the ongoing shortness of breath.  He denies chest pain, fever/chills, cough, back pain, or nausea/vomiting.  EMS placed him on CPAP and gave him a duo nebulizer treatment in route with some improvement in symptoms.     Chief Complaint  Chief Complaint   Patient presents with    Shortness of Breath       Admitting doctor:   Aishwarya Srivastava MD    Admitting diagnosis:   The primary encounter diagnosis was COPD with acute exacerbation. A diagnosis of Acute respiratory failure, unspecified whether with hypoxia or hypercapnia was also pertinent to this visit.    Code status:   Current Code Status       Date Active Code Status Order ID Comments User Context       3/8/2024 0519 CPR (Attempt to Resuscitate) [id number]  Elizabeth Recinos, APRN ED        Question Answer    Code Status (Patient has no pulse and is not breathing) CPR (Attempt to Resuscitate)    Medical Interventions (Patient has pulse or is breathing) Full Support                    Allergies:   Patient has no known allergies.    Isolation:   Enhanced Droplet/Contact     Intake and Output  No intake or output data in the 24 hours ending 03/08/24 0525    Weight:       03/08/24  0341   Weight: 72.6 kg (160 lb)       Most recent vitals:   Vitals:    03/08/24 0344 03/08/24 0350 03/08/24 0356 03/08/24 0441   BP:    126/83   BP Location:       Patient Position:       Pulse:  85 85 89   Resp:  20     Temp: 97.5 °F  (36.4 °C)      TempSrc: Oral      SpO2:  100% 100% 99%   Weight:       Height:           Active LDAs/IV Access:   Lines, Drains & Airways       Active LDAs       Name Placement date Placement time Site Days    Peripheral IV 03/08/24 0338 Left Antecubital 03/08/24 0338  Antecubital  less than 1                    Labs (abnormal labs have a star):   Labs Reviewed   COMPREHENSIVE METABOLIC PANEL - Abnormal; Notable for the following components:       Result Value    Glucose 118 (*)     Alkaline Phosphatase 127 (*)     All other components within normal limits    Narrative:     GFR Normal >60  Chronic Kidney Disease <60  Kidney Failure <15    The GFR formula is only valid for adults with stable renal function between ages 18 and 70.   TROPONIN - Abnormal; Notable for the following components:    HS Troponin T 47 (*)     All other components within normal limits    Narrative:     High Sensitive Troponin T Reference Range:  <14.0 ng/L- Negative Female for AMI  <22.0 ng/L- Negative Male for AMI  >=14 - Abnormal Female indicating possible myocardial injury.  >=22 - Abnormal Male indicating possible myocardial injury.   Clinicians would have to utilize clinical acumen, EKG, Troponin, and serial changes to determine if it is an Acute Myocardial Infarction or myocardial injury due to an underlying chronic condition.        CBC WITH AUTO DIFFERENTIAL - Abnormal; Notable for the following components:    RDW 11.8 (*)     Lymphocyte % 16.1 (*)     Immature Grans % 0.6 (*)     All other components within normal limits   BLOOD GAS, ARTERIAL - Abnormal; Notable for the following components:    pCO2, Arterial 48.3 (*)     pO2, Arterial 104.9 (*)     CO2 Content 29.4 (*)     All other components within normal limits   RESPIRATORY PANEL PCR W/ COVID-19 (SARS-COV-2), NP SWAB IN UTM/VTP, 2 HR TAT - Normal    Narrative:     In the setting of a positive respiratory panel with a viral infection PLUS a negative procalcitonin without other  "underlying concern for bacterial infection, consider observing off antibiotics or discontinuation of antibiotics and continue supportive care. If the respiratory panel is positive for atypical bacterial infection (Bordetella pertussis, Chlamydophila pneumoniae, or Mycoplasma pneumoniae), consider antibiotic de-escalation to target atypical bacterial infection.   BNP (IN-HOUSE) - Normal    Narrative:     This assay is used as an aid in the diagnosis of individuals suspected of having heart failure. It can be used as an aid in the diagnosis of acute decompensated heart failure (ADHF) in patients presenting with signs and symptoms of ADHF to the emergency department (ED). In addition, NT-proBNP of <300 pg/mL indicates ADHF is not likely.    Age Range Result Interpretation  NT-proBNP Concentration (pg/mL:      <50             Positive            >450                   Gray                 300-450                    Negative             <300    50-75           Positive            >900                  Gray                300-900                  Negative            <300      >75             Positive            >1800                  Gray                300-1800                  Negative            <300   LACTIC ACID, PLASMA - Normal   PROCALCITONIN - Normal    Narrative:     As a Marker for Sepsis (Non-Neonates):    1. <0.5 ng/mL represents a low risk of severe sepsis and/or septic shock.  2. >2 ng/mL represents a high risk of severe sepsis and/or septic shock.    As a Marker for Lower Respiratory Tract Infections that require antibiotic therapy:    PCT on Admission    Antibiotic Therapy       6-12 Hrs later    >0.5                Strongly Recommended  >0.25 - <0.5        Recommended   0.1 - 0.25          Discouraged              Remeasure/reassess PCT  <0.1                Strongly Discouraged     Remeasure/reassess PCT    As 28 day mortality risk marker: \"Change in Procalcitonin Result\" (>80% or <=80%) if Day 0 (or Day " 1) and Day 4 values are available. Refer to [email or web address]-pct-[email or web address]    Change in PCT <=80%  A decrease of PCT levels below or equal to 80% defines a positive change in PCT test result representing a higher risk for 28-day all-cause mortality of patients diagnosed with severe sepsis for septic shock.    Change in PCT >80%  A decrease of PCT levels of more than 80% defines a negative change in PCT result representing a lower risk for 28-day all-cause mortality of patients diagnosed with severe sepsis or septic shock.      BLOOD CULTURE   BLOOD CULTURE   HIGH SENSITIVITIY TROPONIN T 2HR   BASIC METABOLIC PANEL   CBC WITH AUTO DIFFERENTIAL   MAGNESIUM   CBC AND DIFFERENTIAL    Narrative:     The following orders were created for panel order CBC & Differential.  Procedure                               Abnormality         Status                     ---------                               -----------         ------                     CBC Auto Differential[[id number]]        Abnormal            Final result                 Please view results for these tests on the individual orders.       EKG:   ECG 12 Lead Dyspnea   Preliminary Result   HEART RATE= 90  bpm   RR Interval= 667  ms   WI Interval= 148  ms   P Horizontal Axis= 1  deg   P Front Axis= 77  deg   QRSD Interval= 112  ms   QT Interval= 396  ms   QTcB= 485  ms   QRS Axis= -3  deg   T Wave Axis= 125  deg   - ABNORMAL ECG -   Sinus rhythm   Supraventricular bigeminy   Probable left ventricular hypertrophy   Anterior Q waves, possibly due to LVH   Abnrm T, consider ischemia, anterolateral lds   Electronically Signed By:    Date and Time of Study: 2024-03-08 03:40:30          Meds given in ED:   Medications   sodium chloride 0.9 % flush 10 mL (has no administration in time range)   sodium chloride 0.9 % flush 10 mL (has no administration in time range)   sodium chloride 0.9 % flush 10 mL (has no administration in time range)   sodium chloride 0.9 % infusion  40 mL (has no administration in time range)   nitroglycerin (NITROSTAT) SL tablet 0.4 mg (has no administration in time range)   acetaminophen (TYLENOL) tablet 650 mg (has no administration in time range)     Or   acetaminophen (TYLENOL) 160 MG/5ML oral solution 650 mg (has no administration in time range)     Or   acetaminophen (TYLENOL) suppository 650 mg (has no administration in time range)   sennosides-docusate (PERICOLACE) 8.6-50 MG per tablet 2 tablet (has no administration in time range)     And   polyethylene glycol (MIRALAX) packet 17 g (has no administration in time range)     And   bisacodyl (DULCOLAX) EC tablet 5 mg (has no administration in time range)     And   bisacodyl (DULCOLAX) suppository 10 mg (has no administration in time range)   ondansetron (ZOFRAN) injection 4 mg (has no administration in time range)   ipratropium-albuterol (DUO-NEB) nebulizer solution 3 mL (has no administration in time range)   albuterol (PROVENTIL) nebulizer solution 0.083% 2.5 mg/3mL (has no administration in time range)   methylPREDNISolone sodium succinate (SOLU-Medrol) injection 60 mg (has no administration in time range)   ipratropium-albuterol (DUO-NEB) nebulizer solution 3 mL (3 mL Nebulization Given 3/8/24 0350)   methylPREDNISolone sodium succinate (SOLU-Medrol) injection 125 mg (125 mg Intravenous Given 3/8/24 0410)       Imaging results:  XR Chest 1 View    Result Date: 3/8/2024  No acute findings.  This report was finalized on 3/8/2024 4:20 AM by Dr. Rosa Sanders M.D on Workstation: [id number]       Ambulatory status:   - assist x1, ambulate with walker.     Social issues:   Social History     Socioeconomic History    Marital status:    Tobacco Use    Smoking status: Former     Types: Cigarettes    Smokeless tobacco: Never   Vaping Use    Vaping status: Never Used   Substance and Sexual Activity    Drug use: Never       Peripheral Neurovascular  Peripheral Neurovascular (Adult)  Peripheral  Neurovascular WDL: WDL  Additional Documentation: Edema (Group)  Edema  Edema: ankle, left, ankle, right  Ankle, Left Edema: 1+ (Trace)  Ankle, Right Edema: 1+ (Trace)    Neuro Cognitive  Neuro Cognitive (Adult)  Cognitive/Neuro/Behavioral WDL: WDL    Learning  Learning Assessment (Adult)  Learning Readiness and Ability: no barriers identified    Respiratory  Respiratory (Adult)  Airway WDL: .WDL except  Respiratory WDL  Respiratory WDL: .WDL except, cough  Cough Frequency: frequent  Cough Type: productive, congested  Breath Sounds  Breath Sounds: All Fields  All Lung Fields Breath Sounds: coarse    Abdominal Pain       Pain Assessments  Pain (Adult)  (0-10) Pain Rating: Rest: 0  (0-10) Pain Rating: Activity: 0    NIH Stroke Scale       Alaina Copeland RN  03/08/24 05:25 EST

## 2024-03-08 NOTE — THERAPY EVALUATION
Patient Name: Wisam Malone  : 1945    MRN: [id number]                              Today's Date: 3/8/2024       Admit Date: 3/8/2024    Visit Dx:     ICD-10-CM ICD-9-CM   1. COPD with acute exacerbation  J44.1 491.21   2. Acute respiratory failure, unspecified whether with hypoxia or hypercapnia  J96.00 518.81     Patient Active Problem List   Diagnosis    COPD exacerbation    Atherosclerotic coronary vascular disease    Hypertension    Weight loss    Ischemic cardiomyopathy    Paroxysmal atrial fibrillation     Past Medical History:   Diagnosis Date    COPD (chronic obstructive pulmonary disease)     Elevated cholesterol     GERD (gastroesophageal reflux disease)     Hypertension      Past Surgical History:   Procedure Laterality Date    CORONARY ARTERY BYPASS GRAFT        General Information       Row Name 24 1527          Physical Therapy Time and Intention    Document Type evaluation  -CB     Mode of Treatment individual therapy;physical therapy  -CB       Row Name 24 1527          General Information    Patient Profile Reviewed yes  -CB     Prior Level of Function independent:;gait;transfer;bed mobility  rollator; reports he ambulates about 30ft at baseline; 2L O2 at baseline  -CB     Existing Precautions/Restrictions fall;oxygen therapy device and L/min  -CB     Barriers to Rehab none identified  -CB       Row Name 24 1527          Living Environment    People in Home spouse  -CB       Row Name 24 1527          Home Main Entrance    Number of Stairs, Main Entrance two  -CB     Stair Railings, Main Entrance railings safe and in good condition  -CB       Row Name 24 1527          Cognition    Orientation Status (Cognition) oriented x 3  -CB       Row Name 24 1527          Safety Issues, Functional Mobility    Impairments Affecting Function (Mobility) endurance/activity tolerance;shortness of breath;strength;balance  -CB               User Key  (r) = Recorded By,  "(t) = Taken By, (c) = Cosigned By      Initials Name Provider Type    Jacquelin Hernandez PT Physical Therapist                   Mobility       Row Name 03/08/24 1531          Bed Mobility    Bed Mobility supine-sit;sit-supine  -CB     Supine-Sit Elbert (Bed Mobility) standby assist  -CB     Sit-Supine Elbert (Bed Mobility) standby assist  -CB       Row Name 03/08/24 1531          Sit-Stand Transfer    Sit-Stand Elbert (Transfers) contact guard;verbal cues  -CB     Assistive Device (Sit-Stand Transfers) --  no AD  -CB     Comment, (Sit-Stand Transfer) x2  -CB       Row Name 03/08/24 1531          Gait/Stairs (Locomotion)    Elbert Level (Gait) contact guard;verbal cues  -CB     Assistive Device (Gait) --  no AD  -CB     Distance in Feet (Gait) a few sidesteps along EOB  -CB     Deviations/Abnormal Patterns (Gait) gait speed decreased;stride length decreased  -CB     Bilateral Gait Deviations forward flexed posture  -CB     Comment, (Gait/Stairs) very unsteady on his feet; states he does not need a walker during sidesteps and denies further ambulation due to \"needing a breathing treatment and not being able to breathe.\" O2 >95% throughout treatment on supp O2  -CB               User Key  (r) = Recorded By, (t) = Taken By, (c) = Cosigned By      Initials Name Provider Type    Jacquelin Hernandez PT Physical Therapist                   Obj/Interventions       Row Name 03/08/24 1532          Range of Motion Comprehensive    General Range of Motion bilateral lower extremity ROM WFL  -CB       Row Name 03/08/24 1532          Strength Comprehensive (MMT)    General Manual Muscle Testing (MMT) Assessment lower extremity strength deficits identified  -CB       Row Name 03/08/24 1532          Balance    Balance Assessment standing static balance;standing dynamic balance;sitting static balance  -CB     Static Sitting Balance standby assist  -CB     Static Standing Balance contact guard  -CB     Dynamic " Standing Balance contact guard  -CB     Position/Device Used, Standing Balance unsupported  -CB       Row Name 03/08/24 1532          Sensory Assessment (Somatosensory)    Sensory Assessment (Somatosensory) sensation intact  -CB               User Key  (r) = Recorded By, (t) = Taken By, (c) = Cosigned By      Initials Name Provider Type    CB Jacquelin Ramirez, PT Physical Therapist                   Goals/Plan       Row Name 03/08/24 1533          Bed Mobility Goal 1 (PT)    Activity/Assistive Device (Bed Mobility Goal 1, PT) bed mobility activities, all  -CB     [place] Level/Cues Needed (Bed Mobility Goal 1, PT) modified independence  -CB     Time Frame (Bed Mobility Goal 1, PT) long term goal (LTG);1 week  -CB       Row Name 03/08/24 1533          Transfer Goal 1 (PT)    Activity/Assistive Device (Transfer Goal 1, PT) sit-to-stand/stand-to-sit;bed-to-chair/chair-to-bed;walker, rolling  -CB     [place] Level/Cues Needed (Transfer Goal 1, PT) standby assist  -CB     Time Frame (Transfer Goal 1, PT) long term goal (LTG);1 week  -CB       Row Name 03/08/24 1533          Gait Training Goal 1 (PT)    Activity/Assistive Device (Gait Training Goal 1, PT) gait (walking locomotion);assistive device use;improve balance and speed;increase endurance/gait distance;decrease fall risk;diminish gait deviation;walker, rolling  -CB     [place] Level (Gait Training Goal 1, PT) contact guard required  -CB     Distance (Gait Training Goal 1, PT) 30ft  -CB     Time Frame (Gait Training Goal 1, PT) long term goal (LTG);1 week  -CB       Row Name 03/08/24 1533          Therapy Assessment/Plan (PT)    Planned Therapy Interventions (PT) balance training;bed mobility training;gait training;home exercise program;patient/family education;transfer training;strengthening  -CB               User Key  (r) = Recorded By, (t) = Taken By, (c) = Cosigned By      Initials Name Provider Type    CB Jacquelin Ramirez, PT Physical Therapist                    Clinical Impression       Row Name 03/08/24 1532          Pain    Pretreatment Pain Rating 0/10 - no pain  -CB       Row Name 03/08/24 1532          Plan of Care Review    Plan of Care Reviewed With patient  -CB       Row Name 03/08/24 1532          Therapy Assessment/Plan (PT)    Rehab Potential (PT) good, to achieve stated therapy goals  -CB     Criteria for Skilled Interventions Met (PT) yes  -CB     Therapy Frequency (PT) 6 times/wk  -CB       Row Name 03/08/24 1532          Vital Signs    Pre SpO2 (%) 97  -CB     O2 Delivery Pre Treatment supplemental O2  -CB     Intra SpO2 (%) 95  -CB     O2 Delivery Intra Treatment supplemental O2  -CB     O2 Delivery Post Treatment supplemental O2  -CB       Row Name 03/08/24 1532          Positioning and Restraints    Pre-Treatment Position in bed  -CB     Post Treatment Position bed  -CB     In Bed notified nsg;fowlers;call light within reach;encouraged to call for assist;exit alarm on;side rails up x2  with RT  -CB               User Key  (r) = Recorded By, (t) = Taken By, (c) = Cosigned By      Initials Name Provider Type    Jacquelin Hernandez, PT Physical Therapist                   Outcome Measures       Row Name 03/08/24 1534 03/08/24 0800       How much help from another person do you currently need...    Turning from your back to your side while in flat bed without using bedrails? 3  -CB 3  -AH    Moving from lying on back to sitting on the side of a flat bed without bedrails? 3  -CB 3  -AH    Moving to and from a bed to a chair (including a wheelchair)? 3  -CB 4  -AH    Standing up from a chair using your arms (e.g., wheelchair, bedside chair)? 3  -CB 4  -AH    Climbing 3-5 steps with a railing? 2  -CB 3  -AH    To walk in hospital room? 2  -CB 3  -AH    AM-PAC 6 Clicks Score (PT) 16  -CB 20  -AH    Highest Level of Mobility Goal 5 --> Static standing  -CB 6 --> Walk 10 steps or more  -AH      Row Name 03/08/24 0636          How much help from another  person do you currently need...    Turning from your back to your side while in flat bed without using bedrails? 3  -CA     Moving from lying on back to sitting on the side of a flat bed without bedrails? 3  -CA     Moving to and from a bed to a chair (including a wheelchair)? 4  -CA     Standing up from a chair using your arms (e.g., wheelchair, bedside chair)? 4  -CA     Climbing 3-5 steps with a railing? 3  -CA     To walk in hospital room? 3  -CA     AM-PAC 6 Clicks Score (PT) 20  -CA     Highest Level of Mobility Goal 6 --> Walk 10 steps or more  -CA       Row Name 03/08/24 1534          Functional Assessment    Outcome Measure Options AM-PAC 6 Clicks Basic Mobility (PT)  -CB               User Key  (r) = Recorded By, (t) = Taken By, (c) = Cosigned By      Initials Name Provider Type    CB Jacquelin Ramirez, PT Physical Therapist    Pinky Eden, RN Registered Nurse    Roxie Toledo, RN Registered Nurse                                 Physical Therapy Education       Title: PT OT SLP Therapies (In Progress)       Topic: Physical Therapy (In Progress)       Point: Mobility training (Done)       Learning Progress Summary             Patient Acceptance, E,TB, VU,NR by CB at 3/8/2024 1535                         Point: Home exercise program (Not Started)       Learner Progress:  Not documented in this visit.              Point: Body mechanics (Done)       Learning Progress Summary             Patient Acceptance, E,TB, VU,NR by CB at 3/8/2024 1535                         Point: Precautions (Done)       Learning Progress Summary             Patient Acceptance, E,TB, VU,NR by CB at 3/8/2024 1535                                         User Key       Initials Effective Dates Name Provider Type Discipline     10/22/21 -  Jacquelin Ramirez, PT Physical Therapist PT                  PT Recommendation and Plan  Planned Therapy Interventions (PT): balance training, bed mobility training, gait training, home exercise  program, patient/family education, transfer training, strengthening  Plan of Care Reviewed With: patient     Time Calculation:         PT Charges       Row Name 03/08/24 1539             Time Calculation    Start Time 1346  -CB      Stop Time 1355  -CB      Time Calculation (min) 9 min  -CB      PT Received On 03/08/24  -CB      PT - Next Appointment 03/09/24  -CB      PT Goal Re-Cert Due Date 03/15/24  -CB                User Key  (r) = Recorded By, (t) = Taken By, (c) = Cosigned By      Initials Name Provider Type    Jacquelin Hernandez, PT Physical Therapist                  Therapy Charges for Today       Code Description Service Date Service Provider Modifiers Qty    [id number] HC PT EVAL MOD COMPLEXITY 3 3/8/2024 Jacquelin Ramirez, PT GP 1            PT G-Codes  Outcome Measure Options: AM-PAC 6 Clicks Basic Mobility (PT)  AM-PAC 6 Clicks Score (PT): 16  PT Discharge Summary  Anticipated Discharge Disposition (PT): home with home health, home with assist, skilled nursing facility (pending progress; likely home at [place] with HHPT)    Jacquelin Ramirez PT  3/8/2024

## 2024-03-08 NOTE — PLAN OF CARE
Goal Outcome Evaluation:           Progress: no change  Outcome Evaluation: VSS, AOx4. Very SOA upon ambulation and with minimal motion. Numerous breathing treatments performed today. On 1L o2 at this time, sating at 94% currently. Productive cough and very coarse breath sounds. WCTM.

## 2024-03-08 NOTE — H&P
Patient Name:  Wisam Malone  YOB: 1945  MRN:  [id number]  Admit Date:  3/8/2024  Patient Care Team:  System, Provider Not In as PCP - General      Subjective   History Present Illness     Chief Complaint   Patient presents with    Shortness of Breath       Mr. Malone is a 78 y.o. male with a history of COPD and hypertension that presents to  complaining of shortness of breath. I put [facility].  Was brought in here by EMS due to complaint of severe shortness of breath.  Symptoms have been present for the last 2 days but last night got acutely worse.  He denies any chest pain.  He denies any change in cough.  No fever or chills.  No URI symptoms.  No sick contacts.  He no longer smokes having quit 2 years ago.  Prior to that smoked at least a pack a day for 50 years.  He reports compliance with all his inhalers.  States he was recently started on prednisone by [facility].  He states he has never been formally tested for sleep apnea but he tried his son's BiPAP and he liked it “so I got me one.”  Wears it with sleep.  He denies hemoptysis.  He has lost about 12 pounds over the last week or 2.      78 y.o. male who presents to the ED c/o fairly sudden onset of severe shortness of breath beginning just prior to ED arrival today.  The patient has had mild episodes of shortness of breath over the last couple of days that significantly worsened tonight.  He does report that he is on a home BiPAP machine at night with the ongoing shortness of breath.  He denies chest pain, fever/chills, cough, back pain, or nausea/vomiting.  EMS placed him on CPAP and gave him a duo nebulizer treatment in route with some improvement in symptoms.           Review of Systems   Constitutional:  Positive for unexpected weight change.   HENT: Negative.     Eyes: Negative.    Respiratory:  Positive for chest tightness, shortness of breath and wheezing.     Cardiovascular:  Positive for leg swelling.   Gastrointestinal: Negative.    Endocrine: Negative.    Genitourinary: Negative.    Musculoskeletal: Negative.    Skin: Negative.    Neurological: Negative.    Hematological: Negative.    Psychiatric/Behavioral: Negative.          Personal History     Past Medical History:   Diagnosis Date    COPD (chronic obstructive pulmonary disease)     Elevated cholesterol     GERD (gastroesophageal reflux disease)     Hypertension      Past Surgical History:   Procedure Laterality Date    CORONARY ARTERY BYPASS GRAFT  1996     History reviewed. No pertinent family history.  Social History     Tobacco Use    Smoking status: Former     Average packs/day: 1 pack/day for 62.0 years (62.0 ttl pk-yrs)     Types: Cigarettes     Start date: 1960    Smokeless tobacco: Never   Vaping Use    Vaping status: Never Used   Substance Use Topics    Drug use: Never     No current facility-administered medications on file prior to encounter.     Current Outpatient Medications on File Prior to Encounter   Medication Sig Dispense Refill    albuterol sulfate  (90 Base) MCG/ACT inhaler Inhale 2 puffs Every 4 (Four) Hours As Needed for Wheezing.      apixaban (ELIQUIS) 5 MG tablet tablet Take 1 tablet by mouth Every 12 (Twelve) Hours.      atorvastatin (LIPITOR) 80 MG tablet Take 1 tablet by mouth Daily.      dextromethorphan-guaifenesin (ROBITUSSIN-DM)  MG/5ML syrup Take 5 mL by mouth Every 6 (Six) Hours As Needed.      famotidine (PEPCID) 20 MG tablet Take 1 tablet by mouth 2 (Two) Times a Day.      fluticasone (FLONASE) 50 MCG/ACT nasal spray 2 sprays into the nostril(s) as directed by provider Daily.      folic acid (FOLVITE) 1 MG tablet Take 1 tablet by mouth Daily.      guaiFENesin (MUCINEX) 600 MG 12 hr tablet Take 2 tablets by mouth 2 (Two) Times a Day.      ipratropium-albuterol (COMBIVENT RESPIMAT)  MCG/ACT inhaler Inhale 1 puff 4 (Four) Times a Day As Needed for Wheezing.       metoprolol tartrate (LOPRESSOR) 50 MG tablet Take 1.5 tablets by mouth 2 (Two) Times a Day.      predniSONE (DELTASONE) 5 MG tablet Take 1 tablet by mouth Daily.      tiotropium bromide monohydrate (SPIRIVA RESPIMAT) 2.5 MCG/ACT aerosol solution inhaler Inhale 2 puffs Daily.      vitamin B-12 (CYANOCOBALAMIN) 500 MCG tablet Take 2 tablets by mouth Daily.      vitamin D (ERGOCALCIFEROL) 1.25 MG (85863 UT) capsule capsule Take 1 capsule by mouth 1 (One) Time Per Week.      aspirin 81 MG EC tablet Take 1 tablet by mouth Daily.      polyethylene glycol (MIRALAX) 17 g packet Take 17 g by mouth Daily.      sacubitril-valsartan (ENTRESTO) 24-26 MG tablet Take 1 tablet by mouth 2 (Two) Times a Day.       No Known Allergies    Objective    Objective     Vital Signs  Temp:  [97.5 °F (36.4 °C)-98.3 °F (36.8 °C)] 98 °F (36.7 °C)  Heart Rate:  [84-90] 90  Resp:  [18-24] 18  BP: (117-126)/() 117/69  SpO2:  [99 %-100 %] 100 %  on  Flow (L/min):  [1-2] 1;   Device (Oxygen Therapy): nasal cannula  Body mass index is 20.29 kg/m².    Physical Exam  Vitals and nursing note reviewed.   Constitutional:       Appearance: He is well-developed. He is ill-appearing (Chronically).   HENT:      Head: Normocephalic and atraumatic.   Eyes:      General: No scleral icterus.  Cardiovascular:      Rate and Rhythm: Normal rate and regular rhythm.   Pulmonary:      Effort: Pulmonary effort is normal.      Breath sounds: Wheezing and rhonchi present.   Abdominal:      General: Bowel sounds are normal. There is no distension.      Palpations: Abdomen is soft.      Tenderness: There is no abdominal tenderness.   Musculoskeletal:      Comments: Pedal edema present   Skin:     General: Skin is warm and dry.   Neurological:      Mental Status: He is alert. Mental status is at baseline.         Results Review:  I reviewed the patient's new clinical results.  I reviewed the patient's new imaging results and agree with the interpretation.  I reviewed  the patient's other test results and agree with the interpretation  I personally viewed and interpreted the patient's EKG/Telemetry data  Discussed with ED provider.    Lab Results (last 24 hours)       Procedure Component Value Units Date/Time    CBC & Differential [[id number]]  (Abnormal) Collected: 03/08/24 0340    Specimen: Blood Updated: 03/08/24 0357    Narrative:      The following orders were created for panel order CBC & Differential.  Procedure                               Abnormality         Status                     ---------                               -----------         ------                     CBC Auto Differential[[id number]]        Abnormal            Final result                 Please view results for these tests on the individual orders.    Comprehensive Metabolic Panel [[id number]]  (Abnormal) Collected: 03/08/24 0340    Specimen: Blood Updated: 03/08/24 0417     Glucose 118 mg/dL      BUN 15 mg/dL      Creatinine 0.92 mg/dL      Sodium 138 mmol/L      Potassium 4.8 mmol/L      Chloride 100 mmol/L      CO2 27.8 mmol/L      Calcium 9.1 mg/dL      Total Protein 6.8 g/dL      Albumin 3.9 g/dL      ALT (SGPT) 31 U/L      AST (SGOT) 25 U/L      Alkaline Phosphatase 127 U/L      Total Bilirubin 0.4 mg/dL      Globulin 2.9 gm/dL      A/G Ratio 1.3 g/dL      BUN/Creatinine Ratio 16.3     Anion Gap 10.2 mmol/L      eGFR 84.6 mL/min/1.73     Narrative:      GFR Normal >60  Chronic Kidney Disease <60  Kidney Failure <15    The GFR formula is only valid for adults with stable renal function between ages 18 and 70.    BNP [[id number]]  (Normal) Collected: 03/08/24 0340    Specimen: Blood Updated: 03/08/24 0424     proBNP 813.0 pg/mL     Narrative:      This assay is used as an aid in the diagnosis of individuals suspected of having heart failure. It can be used as an aid in the diagnosis of acute decompensated heart failure (ADHF) in patients presenting with signs and symptoms of ADHF to the emergency  department (ED). In addition, NT-proBNP of <300 pg/mL indicates ADHF is not likely.    Age Range Result Interpretation  NT-proBNP Concentration (pg/mL:      <50             Positive            >450                   Gray                 300-450                    Negative             <300    50-75           Positive            >900                  Gray                300-900                  Negative            <300      >75             Positive            >1800                  Gray                300-1800                  Negative            <300    High Sensitivity Troponin T [[id number]]  (Abnormal) Collected: 03/08/24 0340    Specimen: Blood Updated: 03/08/24 0424     HS Troponin T 47 ng/L     Narrative:      High Sensitive Troponin T Reference Range:  <14.0 ng/L- Negative Female for AMI  <22.0 ng/L- Negative Male for AMI  >=14 - Abnormal Female indicating possible myocardial injury.  >=22 - Abnormal Male indicating possible myocardial injury.   Clinicians would have to utilize clinical acumen, EKG, Troponin, and serial changes to determine if it is an Acute Myocardial Infarction or myocardial injury due to an underlying chronic condition.         Lactic Acid, Plasma [[id number]]  (Normal) Collected: 03/08/24 0340    Specimen: Blood Updated: 03/08/24 0416     Lactate 0.9 mmol/L     Procalcitonin [[id number]]  (Normal) Collected: 03/08/24 0340    Specimen: Blood Updated: 03/08/24 0424     Procalcitonin 0.06 ng/mL     Narrative:      As a Marker for Sepsis (Non-Neonates):    1. <0.5 ng/mL represents a low risk of severe sepsis and/or septic shock.  2. >2 ng/mL represents a high risk of severe sepsis and/or septic shock.    As a Marker for Lower Respiratory Tract Infections that require antibiotic therapy:    PCT on Admission    Antibiotic Therapy       6-12 Hrs later    >0.5                Strongly Recommended  >0.25 - <0.5        Recommended   0.1 - 0.25          Discouraged              Remeasure/reassess  "PCT  <0.1                Strongly Discouraged     Remeasure/reassess PCT    As 28 day mortality risk marker: \"Change in Procalcitonin Result\" (>80% or <=80%) if Day 0 (or Day 1) and Day 4 values are available. Refer to http://www.[facility]-pct-calculator.com    Change in PCT <=80%  A decrease of PCT levels below or equal to 80% defines a positive change in PCT test result representing a higher risk for 28-day all-cause mortality of patients diagnosed with severe sepsis for septic shock.    Change in PCT >80%  A decrease of PCT levels of more than 80% defines a negative change in PCT result representing a lower risk for 28-day all-cause mortality of patients diagnosed with severe sepsis or septic shock.       CBC Auto Differential [[id number]]  (Abnormal) Collected: 03/08/24 0340    Specimen: Blood Updated: 03/08/24 0357     WBC 8.68 10*3/mm3      RBC 4.65 10*6/mm3      Hemoglobin 14.2 g/dL      Hematocrit 43.7 %      MCV 94.0 fL      MCH 30.5 pg      MCHC 32.5 g/dL      RDW 11.8 %      RDW-SD 40.7 fl      MPV 9.1 fL      Platelets 239 10*3/mm3      Neutrophil % 73.3 %      Lymphocyte % 16.1 %      Monocyte % 8.2 %      Eosinophil % 1.0 %      Basophil % 0.8 %      Immature Grans % 0.6 %      Neutrophils, Absolute 6.36 10*3/mm3      Lymphocytes, Absolute 1.40 10*3/mm3      Monocytes, Absolute 0.71 10*3/mm3      Eosinophils, Absolute 0.09 10*3/mm3      Basophils, Absolute 0.07 10*3/mm3      Immature Grans, Absolute 0.05 10*3/mm3      nRBC 0.0 /100 WBC     Blood Gas, Arterial - [[id number]]  (Abnormal) Collected: 03/08/24 0346    Specimen: Arterial Blood Updated: 03/08/24 0350     Site Right Radial     Paul's Test Positive     pH, Arterial 7.370 pH units      pCO2, Arterial 48.3 mm Hg      pO2, Arterial 104.9 mm Hg      HCO3, Arterial 27.9 mmol/L      Base Excess, Arterial 1.8 mmol/L      Comment: Serial Number: [id number]:  [id number]        O2 Saturation, Arterial 97.8 %      CO2 Content 29.4 mmol/L      Barometric " Pressure for Blood Gas 749.5000 mmHg      Modality Cannula     Flow Rate 2.0000 lpm      Hemodilution No     Device Comment sat 100%    Respiratory Panel PCR w/COVID-19(SARS-CoV-2) KUSHAL/JAMI/MENA/PAD/COR/GARY In-House, NP Swab in UTM/VTM, 2 HR TAT - Swab, Nasopharynx [[id number]]  (Normal) Collected: 03/08/24 0414    Specimen: Swab from Nasopharynx Updated: 03/08/24 0524     ADENOVIRUS, PCR Not Detected     Coronavirus 229E Not Detected     Coronavirus HKU1 Not Detected     Coronavirus NL63 Not Detected     Coronavirus OC43 Not Detected     COVID19 Not Detected     Human Metapneumovirus Not Detected     Human Rhinovirus/Enterovirus Not Detected     Influenza A PCR Not Detected     Influenza B PCR Not Detected     Parainfluenza Virus 1 Not Detected     Parainfluenza Virus 2 Not Detected     Parainfluenza Virus 3 Not Detected     Parainfluenza Virus 4 Not Detected     RSV, PCR Not Detected     Bordetella pertussis pcr Not Detected     Bordetella parapertussis PCR Not Detected     Chlamydophila pneumoniae PCR Not Detected     Mycoplasma pneumo by PCR Not Detected    Narrative:      In the setting of a positive respiratory panel with a viral infection PLUS a negative procalcitonin without other underlying concern for bacterial infection, consider observing off antibiotics or discontinuation of antibiotics and continue supportive care. If the respiratory panel is positive for atypical bacterial infection (Bordetella pertussis, Chlamydophila pneumoniae, or Mycoplasma pneumoniae), consider antibiotic de-escalation to target atypical bacterial infection.    Blood Culture - Blood, Arm, Right [[id number]] Collected: 03/08/24 0708    Specimen: Blood from Arm, Right Updated: 03/08/24 0719    High Sensitivity Troponin T 2Hr [[id number]]  (Abnormal) Collected: 03/08/24 0708    Specimen: Blood Updated: 03/08/24 0746     HS Troponin T 48 ng/L      Troponin T Delta 1 ng/L     Narrative:      High Sensitive Troponin T Reference  Range:  <14.0 ng/L- Negative Female for AMI  <22.0 ng/L- Negative Male for AMI  >=14 - Abnormal Female indicating possible myocardial injury.  >=22 - Abnormal Male indicating possible myocardial injury.   Clinicians would have to utilize clinical acumen, EKG, Troponin, and serial changes to determine if it is an Acute Myocardial Infarction or myocardial injury due to an underlying chronic condition.         Basic Metabolic Panel [[id number]]  (Abnormal) Collected: 03/08/24 0708    Specimen: Blood Updated: 03/08/24 0746     Glucose 113 mg/dL      BUN 15 mg/dL      Creatinine 0.92 mg/dL      Sodium 138 mmol/L      Potassium 4.9 mmol/L      Chloride 100 mmol/L      CO2 29.2 mmol/L      Calcium 9.3 mg/dL      BUN/Creatinine Ratio 16.3     Anion Gap 8.8 mmol/L      eGFR 84.6 mL/min/1.73     Narrative:      GFR Normal >60  Chronic Kidney Disease <60  Kidney Failure <15    The GFR formula is only valid for adults with stable renal function between ages 18 and 70.    CBC Auto Differential [[id number]]  (Abnormal) Collected: 03/08/24 0708    Specimen: Blood Updated: 03/08/24 0736     WBC 11.38 10*3/mm3      RBC 4.58 10*6/mm3      Hemoglobin 14.0 g/dL      Hematocrit 42.2 %      MCV 92.1 fL      MCH 30.6 pg      MCHC 33.2 g/dL      RDW 11.8 %      RDW-SD 39.4 fl      MPV 9.1 fL      Platelets 223 10*3/mm3      Neutrophil % 90.9 %      Lymphocyte % 5.8 %      Monocyte % 2.0 %      Eosinophil % 0.2 %      Basophil % 0.4 %      Immature Grans % 0.7 %      Neutrophils, Absolute 10.34 10*3/mm3      Lymphocytes, Absolute 0.66 10*3/mm3      Monocytes, Absolute 0.23 10*3/mm3      Eosinophils, Absolute 0.02 10*3/mm3      Basophils, Absolute 0.05 10*3/mm3      Immature Grans, Absolute 0.08 10*3/mm3      nRBC 0.0 /100 WBC     Magnesium [[id number]]  (Normal) Collected: 03/08/24 0708    Specimen: Blood Updated: 03/08/24 0746     Magnesium 2.2 mg/dL     Blood Culture - Blood, Hand, Left [[id number]] Collected: 03/08/24 0712    Specimen: Blood  from Hand, Left Updated: 03/08/24 0719            Imaging Results (Last 24 Hours)       Procedure Component Value Units Date/Time    XR Chest 1 View [[id number]] Collected: 03/08/24 0419     Updated: 03/08/24 0423    Narrative:      SINGLE VIEW OF THE CHEST     HISTORY: Shortness of air     COMPARISON: November 16, 2023     FINDINGS:  Heart size is within normal limits. Patient is status post median  sternotomy with coronary artery bypass grafting. No pneumothorax,  pleural effusion, or acute infiltrate is seen. Blunting of the right  costophrenic angle is stable. Background emphysematous changes are  noted.       Impression:      No acute findings.     This report was finalized on 3/8/2024 4:20 AM by Dr. Rosa Sanders M.D on Workstation: [id number]               Results for orders placed during the hospital encounter of 11/16/23    Adult Transthoracic Echo Complete W/ Cont if Necessary Per Protocol    Interpretation Summary    There is a small to moderate size layered thrombus in the anterior apex    The left ventricular cavity is mild to moderately dilated.    The anteroseptum and apex are thinned, consistent with a prior infarct. There is akinesis of the mid to distal anteroseptum and the entire apex.    Left ventricular systolic function is moderately decreased. Left ventricular ejection fraction appears to be 36 - 40%.    Left ventricular diastolic function is consistent with (grade I) impaired relaxation.    Normal right ventricular cavity size and systolic function noted.    The left atrial cavity is mildly dilated.    Calculated right ventricular systolic pressure from tricuspid regurgitation is 29 mmHg.    There is no evidence of pericardial effusion.    ECG 12 Lead Dyspnea   Final Result   HEART RATE= 90  bpm   RR Interval= 667  ms   ME Interval= 148  ms   P Horizontal Axis= 1  deg   P Front Axis= 77  deg   QRSD Interval= 112  ms   QT Interval= 396  ms   QTcB= 485  ms   QRS Axis= -3  deg   T Wave  Axis= 125  deg   - ABNORMAL ECG -   Sinus rhythm   Supraventricular bigeminy   Probable left ventricular hypertrophy   Anterior Q waves, possibly due to LVH   Abnrm T, consider ischemia, anterolateral lds   No change from previous tracing   Electronically Signed By: Argentina Luz ([facility]) 08-Mar-2024 10:08:53   Date and Time of Study: 2024-03-08 03:40:30        Assessment/Plan   Assessment & Plan   Active Hospital Problems    Diagnosis  POA    **COPD exacerbation [J44.1]  Yes    Atherosclerotic coronary vascular disease [I25.10]  Yes    Hypertension [I10]  Yes    Weight loss [R63.4]  Yes    Ischemic cardiomyopathy [I25.5]  Yes    Paroxysmal atrial fibrillation [I48.0]  Yes      Resolved Hospital Problems   No resolved problems to display.       78 y.o. male admitted with COPD exacerbation.    Patient admitted for acute exacerbation of COPD.  He has significant rhonchi and wheezing on exam.  Chest x-ray reviewed and negative for infiltrate.  Will continue inhalants as well as steroids.  Will consult pulmonology.  He had CT chest 4 months ago without evidence of malignancy.  He had severe emphysematous changes at that time.  He has mild relatively flat elevation of troponin similar to last hospitalization.  No complaint of chest pain.  No plans for further cardiac workup at this time.    Continue cardiac medications including Eliquis for history paroxysmal atrial fibrillation.      Pharmacy to dose Lovenox for DVT prophylaxis.  Full code.  Discussed with patient, nursing staff, and ED provider.      Jose Cruz Olson MD  [facility]  03/08/24  10:17 EST

## 2024-03-09 PROBLEM — I51.3 LV (LEFT VENTRICULAR) MURAL THROMBUS: Status: ACTIVE | Noted: 2024-03-09

## 2024-03-09 PROCEDURE — 94799 UNLISTED PULMONARY SVC/PX: CPT

## 2024-03-09 PROCEDURE — 25810000003 SODIUM CHLORIDE 0.9 % SOLUTION 250 ML FLEX CONT: Performed by: HOSPITALIST

## 2024-03-09 PROCEDURE — 25010000002 AZITHROMYCIN PER 500 MG: Performed by: HOSPITALIST

## 2024-03-09 PROCEDURE — 94761 N-INVAS EAR/PLS OXIMETRY MLT: CPT

## 2024-03-09 PROCEDURE — 94760 N-INVAS EAR/PLS OXIMETRY 1: CPT

## 2024-03-09 PROCEDURE — 94660 CPAP INITIATION&MGMT: CPT

## 2024-03-09 PROCEDURE — 25010000002 METHYLPREDNISOLONE PER 125 MG: Performed by: NURSE PRACTITIONER

## 2024-03-09 RX ORDER — PREDNISONE 20 MG/1
40 TABLET ORAL
Status: DISCONTINUED | OUTPATIENT
Start: 2024-03-10 | End: 2024-03-10 | Stop reason: HOSPADM

## 2024-03-09 RX ORDER — ALBUTEROL SULFATE 2.5 MG/3ML
2.5 SOLUTION RESPIRATORY (INHALATION)
Status: DISCONTINUED | OUTPATIENT
Start: 2024-03-09 | End: 2024-03-10 | Stop reason: HOSPADM

## 2024-03-09 RX ADMIN — GUAIFENESIN 1200 MG: 600 TABLET, EXTENDED RELEASE ORAL at 08:09

## 2024-03-09 RX ADMIN — Medication 1000 MCG: at 08:09

## 2024-03-09 RX ADMIN — METOPROLOL TARTRATE 75 MG: 25 TABLET, FILM COATED ORAL at 20:23

## 2024-03-09 RX ADMIN — APIXABAN 5 MG: 5 TABLET, FILM COATED ORAL at 20:23

## 2024-03-09 RX ADMIN — FOLIC ACID 1 MG: 1 TABLET ORAL at 08:09

## 2024-03-09 RX ADMIN — ATORVASTATIN CALCIUM 80 MG: 80 TABLET, FILM COATED ORAL at 20:24

## 2024-03-09 RX ADMIN — METHYLPREDNISOLONE SODIUM SUCCINATE 60 MG: 125 INJECTION, POWDER, FOR SOLUTION INTRAMUSCULAR; INTRAVENOUS at 02:38

## 2024-03-09 RX ADMIN — IPRATROPIUM BROMIDE AND ALBUTEROL SULFATE 3 ML: .5; 3 SOLUTION RESPIRATORY (INHALATION) at 14:15

## 2024-03-09 RX ADMIN — IPRATROPIUM BROMIDE AND ALBUTEROL SULFATE 3 ML: .5; 3 SOLUTION RESPIRATORY (INHALATION) at 07:23

## 2024-03-09 RX ADMIN — IPRATROPIUM BROMIDE AND ALBUTEROL SULFATE 3 ML: .5; 3 SOLUTION RESPIRATORY (INHALATION) at 19:29

## 2024-03-09 RX ADMIN — FAMOTIDINE 20 MG: 20 TABLET, FILM COATED ORAL at 08:09

## 2024-03-09 RX ADMIN — BUDESONIDE AND FORMOTEROL FUMARATE DIHYDRATE 2 PUFF: 160; 4.5 AEROSOL RESPIRATORY (INHALATION) at 19:34

## 2024-03-09 RX ADMIN — IPRATROPIUM BROMIDE AND ALBUTEROL SULFATE 3 ML: .5; 3 SOLUTION RESPIRATORY (INHALATION) at 22:50

## 2024-03-09 RX ADMIN — METHYLPREDNISOLONE SODIUM SUCCINATE 60 MG: 125 INJECTION, POWDER, FOR SOLUTION INTRAMUSCULAR; INTRAVENOUS at 11:12

## 2024-03-09 RX ADMIN — BUDESONIDE AND FORMOTEROL FUMARATE DIHYDRATE 2 PUFF: 160; 4.5 AEROSOL RESPIRATORY (INHALATION) at 07:23

## 2024-03-09 RX ADMIN — AZITHROMYCIN MONOHYDRATE 500 MG: 500 INJECTION, POWDER, LYOPHILIZED, FOR SOLUTION INTRAVENOUS at 18:38

## 2024-03-09 RX ADMIN — METOPROLOL TARTRATE 75 MG: 25 TABLET, FILM COATED ORAL at 08:09

## 2024-03-09 RX ADMIN — FLUTICASONE PROPIONATE 2 SPRAY: 50 SPRAY, METERED NASAL at 08:08

## 2024-03-09 RX ADMIN — FAMOTIDINE 20 MG: 20 TABLET, FILM COATED ORAL at 20:23

## 2024-03-09 RX ADMIN — GUAIFENESIN 1200 MG: 600 TABLET, EXTENDED RELEASE ORAL at 20:23

## 2024-03-09 RX ADMIN — IPRATROPIUM BROMIDE AND ALBUTEROL SULFATE 3 ML: .5; 3 SOLUTION RESPIRATORY (INHALATION) at 02:51

## 2024-03-09 RX ADMIN — APIXABAN 5 MG: 5 TABLET, FILM COATED ORAL at 08:09

## 2024-03-09 RX ADMIN — IPRATROPIUM BROMIDE AND ALBUTEROL SULFATE 3 ML: .5; 3 SOLUTION RESPIRATORY (INHALATION) at 10:23

## 2024-03-09 NOTE — PLAN OF CARE
Problem: Adult Inpatient Plan of Care  Goal: Plan of Care Review  Outcome: Ongoing, Progressing  Flowsheets (Taken 3/9/2024 0613)  Plan of Care Reviewed With: patient  Goal: Patient-Specific Goal (Individualized)  Outcome: Ongoing, Progressing  Goal: Absence of Hospital-Acquired Illness or Injury  Outcome: Ongoing, Progressing  Intervention: Identify and Manage Fall Risk  Recent Flowsheet Documentation  Taken 3/9/2024 0600 by Yaz Saucedo RN  Safety Promotion/Fall Prevention: safety round/check completed  Taken 3/9/2024 0400 by Yaz Saucedo RN  Safety Promotion/Fall Prevention: safety round/check completed  Taken 3/9/2024 0000 by Yaz Saucedo RN  Safety Promotion/Fall Prevention: safety round/check completed  Intervention: Prevent Skin Injury  Recent Flowsheet Documentation  Taken 3/9/2024 0600 by Yaz Saucedo RN  Body Position: position changed independently  Taken 3/9/2024 0400 by Yaz Saucedo RN  Body Position: position changed independently  Taken 3/9/2024 0000 by Yaz Saucedo RN  Body Position: position changed independently  Skin Protection: adhesive use limited  Intervention: Prevent and Manage VTE (Venous Thromboembolism) Risk  Recent Flowsheet Documentation  Taken 3/9/2024 0000 by Yaz Saucedo RN  Activity Management: activity encouraged  VTE Prevention/Management: patient refused intervention  Range of Motion: active ROM (range of motion) encouraged  Intervention: Prevent Infection  Recent Flowsheet Documentation  Taken 3/9/2024 0600 by Yaz Saucedo RN  Infection Prevention:   hand hygiene promoted   rest/sleep promoted  Taken 3/9/2024 0400 by Yaz Saucedo RN  Infection Prevention:   hand hygiene promoted   rest/sleep promoted  Taken 3/9/2024 0200 by Yaz Saucedo RN  Infection Prevention:   hand hygiene promoted   rest/sleep promoted  Taken 3/9/2024 0000 by Yaz Saucedo RN  Infection  Prevention:   hand hygiene promoted   rest/sleep promoted  Goal: Optimal Comfort and Wellbeing  Outcome: Ongoing, Progressing  Intervention: Provide Person-Centered Care  Recent Flowsheet Documentation  Taken 3/9/2024 0000 by Yaz Saucedo RN  Trust Relationship/Rapport: care explained  Goal: Readiness for Transition of Care  Outcome: Ongoing, Progressing     Problem: Adjustment to Illness COPD (Chronic Obstructive Pulmonary Disease)  Goal: Optimal Chronic Illness Coping  Outcome: Ongoing, Progressing  Intervention: Support and Optimize Psychosocial Response  Recent Flowsheet Documentation  Taken 3/9/2024 0000 by Yaz Saucedo RN  Family/Support System Care: self-care encouraged     Problem: Functional Ability Impaired COPD (Chronic Obstructive Pulmonary Disease)  Goal: Optimal Level of Functional [place]  Outcome: Ongoing, Progressing  Intervention: Optimize Functional Ability  Recent Flowsheet Documentation  Taken 3/9/2024 0000 by Yaz Saucedo RN  Activity Management: activity encouraged     Problem: Infection COPD (Chronic Obstructive Pulmonary Disease)  Goal: Absence of Infection Signs and Symptoms  Outcome: Ongoing, Progressing     Problem: Oral Intake Inadequate COPD (Chronic Obstructive Pulmonary Disease)  Goal: Improved Nutrition Intake  Outcome: Ongoing, Progressing     Problem: Respiratory Compromise COPD (Chronic Obstructive Pulmonary Disease)  Goal: Effective Oxygenation and Ventilation  Outcome: Ongoing, Progressing  Intervention: Promote Airway Secretion Clearance  Recent Flowsheet Documentation  Taken 3/9/2024 0000 by Yaz Saucedo RN  Activity Management: activity encouraged  Cough And Deep Breathing: done independently per patient  Intervention: Optimize Oxygenation and Ventilation  Recent Flowsheet Documentation  Taken 3/9/2024 0600 by Yaz Saucedo RN  Head of Bed (HOB) Positioning: HOB elevated  Taken 3/9/2024 0400 by Yaz Saucedo  RN  Head of Bed (HOB) Positioning: HOB elevated  Taken 3/9/2024 0000 by Yaz Saucedo RN  Head of Bed (HOB) Positioning: HOB elevated     Problem: Skin Injury Risk Increased  Goal: Skin Health and Integrity  Outcome: Ongoing, Progressing  Intervention: Optimize Skin Protection  Recent Flowsheet Documentation  Taken 3/9/2024 0600 by Yaz Saucedo RN  Head of Bed (HOB) Positioning: HOB elevated  Taken 3/9/2024 0400 by Yaz Saucedo RN  Head of Bed (HOB) Positioning: HOB elevated  Taken 3/9/2024 0000 by Yaz Saucedo RN  Pressure Reduction Techniques: frequent weight shift encouraged  Head of Bed (HOB) Positioning: HOB elevated  Pressure Reduction Devices: alternating pressure pump (ADD)  Skin Protection: adhesive use limited     Problem: COPD (Chronic Obstructive Pulmonary Disease) Comorbidity  Goal: Maintenance of COPD Symptom Control  Outcome: Ongoing, Progressing  Intervention: Maintain COPD-Symptom Control  Recent Flowsheet Documentation  Taken 3/9/2024 0600 by Yaz Saucedo RN  Medication Review/Management: medications reviewed  Taken 3/9/2024 0400 by Yaz Saucedo RN  Medication Review/Management: medications reviewed  Taken 3/9/2024 0000 by Yaz Saucedo RN  Medication Review/Management: medications reviewed     Problem: Fall Injury Risk  Goal: Absence of Fall and Fall-Related Injury  Outcome: Ongoing, Progressing  Intervention: Identify and Manage Contributors  Recent Flowsheet Documentation  Taken 3/9/2024 0600 by Yaz Saucedo RN  Medication Review/Management: medications reviewed  Taken 3/9/2024 0400 by Yaz Saucedo RN  Medication Review/Management: medications reviewed  Taken 3/9/2024 0000 by Yaz Saucedo RN  Medication Review/Management: medications reviewed  Intervention: Promote Injury-Free Environment  Recent Flowsheet Documentation  Taken 3/9/2024 0600 by Yaz Saucedo RN  Safety Promotion/Fall  Prevention: safety round/check completed  Taken 3/9/2024 0400 by Yaz Saucedo, RN  Safety Promotion/Fall Prevention: safety round/check completed  Taken 3/9/2024 0000 by Yaz Saucedo, RN  Safety Promotion/Fall Prevention: safety round/check completed   Goal Outcome Evaluation:  Plan of Care Reviewed With: patient

## 2024-03-09 NOTE — PROGRESS NOTES
[facility]  [phone]  Dr. Galo Garcia    Subjective:  LOS: 1    Chief Complaint:      He is doing well today.  He has no acute complaints or questions.  He reports that his breathing and that close to baseline.  He does not feel like he is wheezing.  He is currently on 2 L of oxygen satting 99%.  He reports he has an oxygen concentrator at home but has not needed to use it in years.      Objective   Vital Signs past 24hrs    Temp range: Temp (24hrs), Av °F (36.7 °C), Min:97.7 °F (36.5 °C), Max:98.4 °F (36.9 °C)    BP range: BP: ()/(53-66) 111/53  Pulse range: Heart Rate:  [74-97] 74  Resp rate range: Resp:  [18-24] 18    Device (Oxygen Therapy): nasal cannulaFlow (L/min):  [1-2] 2  Oxygen range:SpO2:  [93 %-100 %] 98 %      67.9 kg (149 lb 9.6 oz); Body mass index is 20.29 kg/m².    Intake/Output Summary (Last 24 hours) at 3/9/2024 1241  Last data filed at 3/9/2024 1232  Gross per 24 hour   Intake 480 ml   Output 750 ml   Net -270 ml         Physical Exam  Constitutional:       Appearance: Normal appearance.   HENT:      Head: Normocephalic and atraumatic.      Nose: Nose normal.      Mouth/Throat:      Mouth: Mucous membranes are moist.      Pharynx: Oropharynx is clear.   Eyes:      Extraocular Movements: Extraocular movements intact.      Conjunctiva/sclera: Conjunctivae normal.   Cardiovascular:      Rate and Rhythm: Normal rate and regular rhythm.      Pulses: Normal pulses.      Heart sounds: Normal heart sounds. No murmur heard.  Pulmonary:      Effort: Pulmonary effort is normal. No respiratory distress.      Breath sounds: Normal breath sounds. No stridor. No wheezing or rales.   Abdominal:      General: Abdomen is flat. Bowel sounds are normal.      Palpations: Abdomen is soft.      Tenderness: There is no abdominal tenderness.   Skin:     General: Skin is warm and dry.   Neurological:      General: No focal deficit present.      Mental Status: He is alert and oriented to  person, place, and time.       Results Review:    I have reviewed the laboratory and imaging data since the last note by [facility] physician.  My annotations are noted in assessment and plan.    Lab Results (last 24 hours)       ** No results found for the last 24 hours. **          XR Chest 1 View    Result Date: 3/8/2024  No acute findings.  This report was finalized on 3/8/2024 4:20 AM by Dr. Rosa Sanders M.D on Workstation: [id number]       Lab Results (last 24 hours)       Procedure Component Value Units Date/Time    Blood Culture - Blood, Hand, Left [[id number]]  (Normal) Collected: 03/08/24 0712    Specimen: Blood from Hand, Left Updated: 03/09/24 0730     Blood Culture No growth at 24 hours    Narrative:      Less than seven (7) mL's of blood was collected.  Insufficient quantity may yield false negative results.    Blood Culture - Blood, Arm, Right [[id number]]  (Normal) Collected: 03/08/24 0708    Specimen: Blood from Arm, Right Updated: 03/09/24 0730     Blood Culture No growth at 24 hours    Narrative:      Less than seven (7) mL's of blood was collected.  Insufficient quantity may yield false negative results.          Imaging Results (Last 24 Hours)       ** No results found for the last 24 hours. **            Result Review:  I have personally reviewed the results from last note by [facility] physician to 3/9/2024 12:41 EST and agree with these findings:  [x]  Laboratory list / accordion  [x]  Microbiology  [x]  Radiology  []  EKG/Telemetry   []  Cardiology/Vascular   []  Pathology  []  Old records  []  Other:    Medication Review:  I have reviewed the current MAR.  My annotations are noted in assessment and plan.    apixaban, 5 mg, Oral, Q12H  atorvastatin, 80 mg, Oral, Nightly  azithromycin, 500 mg, Intravenous, Q24H  budesonide-formoterol, 2 puff, Inhalation, BID - RT  famotidine, 20 mg, Oral, BID  fluticasone, 2 spray, Nasal, Daily  folic acid, 1 mg, Oral, Daily  guaiFENesin, 1,200 mg, Oral,  Q12H  ipratropium-albuterol, 3 mL, Nebulization, Q4H - RT  metoprolol tartrate, 75 mg, Oral, BID  [START ON 3/10/2024] predniSONE, 40 mg, Oral, Daily With Breakfast  vitamin B-12, 1,000 mcg, Oral, Daily           Lines, Drains & Airways       Active LDAs       Name Placement date Placement time Site Days    Peripheral IV 03/08/24 0338 Left Antecubital 03/08/24  0338  Antecubital  1                  No active isolations  Diet Orders (active) (From admission, onward)       Start     Ordered    03/08/24 0518  Diet: Cardiac; Healthy Heart (2-3 Na+); Fluid Consistency: Thin (IDDSI 0)  Diet Effective Now         03/08/24 0519                  PCCM Problems  Acute COPD exacerbation  Acute hypoxic respiratory failure  Chronic hypercapnic respiratory failure on Bipap  Former smoker  Leukocytosis    Relevant Medical Diagnoses  CAD   Hypertension  Ischemic cardiomyopathy  A-fib  AAA (4 cm on CTA chest 11/17/23)        THESE ARE NEW MEDICAL PROBLEMS TO ME.    Plan of Treatment  - Respiratory status close to baseline.  - Recommend switching steroids to prednisone 40 mg daily for 5 more days starting 3/10/24  - Complete 3 days azithromycin 500 mg daily (last dose 3/10/24)  - Goal O2 sats 88 to 92%, wean O2 as tolerated  - In the room today, he was satting 99% on 2 L of oxygen.  I turned the oxygen off and recommended to him to see how he does today without oxygen.  If his respiratory status continues to improve, I anticipate possible discharge tomorrow 3/10/24.  [facility] and he can call them for follow-up appointment after discharge. At home, he he reports being on prednisone 5 mg daily for his severe COPD.  I told him he could probably come off of this in the future, but that for now he can go back to 5 mg after finishing the 40 mg dose and he can follow-up with [facility] for further discussion of discontinuing chronic steroids.  - Patient uses trilogy vent at home for severe COPD.  Okay to continue using  BiPAP at night while hospitalized.      Galo Garcia MD  03/09/24  12:41 EST      Part of this note may be an electronic transcription/translation of spoken language to printed text using the Dragon Dictation System.

## 2024-03-09 NOTE — PLAN OF CARE
Goal Outcome Evaluation:  Plan of Care Reviewed With: patient        Progress: improving  Outcome Evaluation: VSS, AOx4. Breathing seems to be much improved today vs. yesterday, still has some SOA with ambulation however time to recover has improved as well. Decent appetite and fluid intake. No new issues or pain complaints at this time. Irrigated and new dressing to top of right foot. SCDs applied, tolerating welll. WCTM.

## 2024-03-09 NOTE — PROGRESS NOTES
Name: Wisam Malone ADMIT: 3/8/2024   : 1945  PCP: System, Provider Not In    MRN: [id number] LOS: 1 days   AGE/SEX: 78 y.o. male  ROOM: [facility]/     Subjective   Subjective   No acute events. Patient is feeling better overall but still short of breath and having a productive cough. Taking PO. No CP/d/c/n/v/d.  No family at bedside.    Objective   Objective   Vital Signs  Temp:  [97.7 °F (36.5 °C)-98.4 °F (36.9 °C)] 97.7 °F (36.5 °C)  Heart Rate:  [74-97] 74  Resp:  [18-24] 18  BP: ()/(53-66) 111/53  SpO2:  [93 %-100 %] 98 %  on  Flow (L/min):  [1-2] 2;   Device (Oxygen Therapy): nasal cannula  Body mass index is 20.29 kg/m².  Physical Exam  Vitals and nursing note reviewed.   Constitutional:       General: He is not in acute distress.     Appearance: He is ill-appearing. He is not toxic-appearing or diaphoretic.   HENT:      Head: Normocephalic and atraumatic.      Nose: Nose normal.      Mouth/Throat:      Mouth: Mucous membranes are moist.      Pharynx: Oropharynx is clear.   Eyes:      Conjunctiva/sclera: Conjunctivae normal.      Pupils: Pupils are equal, round, and reactive to light.   Cardiovascular:      Rate and Rhythm: Normal rate and regular rhythm.      Pulses: Normal pulses.   Pulmonary:      Effort: Pulmonary effort is normal.      Breath sounds: Wheezing and rhonchi present.   Abdominal:      General: Bowel sounds are normal. There is no distension.      Palpations: Abdomen is soft.      Tenderness: There is no abdominal tenderness.   Musculoskeletal:         General: No swelling or tenderness.      Cervical back: Neck supple.   Skin:     General: Skin is warm and dry.      Capillary Refill: Capillary refill takes less than 2 seconds.   Neurological:      General: No focal deficit present.      Mental Status: He is alert and oriented to person, place, and time.   Psychiatric:         Mood and Affect: Mood normal.         Behavior: Behavior normal.       Results Review     I reviewed  "the patient's new clinical results.  Results from last 7 days   Lab Units 03/08/24  0708 03/08/24  0340   WBC 10*3/mm3 11.38* 8.68   HEMOGLOBIN g/dL 14.0 14.2   PLATELETS 10*3/mm3 223 239     Results from last 7 days   Lab Units 03/08/24  0708 03/08/24  0340   SODIUM mmol/L 138 138   POTASSIUM mmol/L 4.9 4.8   CHLORIDE mmol/L 100 100   CO2 mmol/L 29.2* 27.8   BUN mg/dL 15 15   CREATININE mg/dL 0.92 0.92   GLUCOSE mg/dL 113* 118*   EGFR mL/min/1.73 84.6 84.6     Results from last 7 days   Lab Units 03/08/24  0340   ALBUMIN g/dL 3.9   BILIRUBIN mg/dL 0.4   ALK PHOS U/L 127*   AST (SGOT) U/L 25   ALT (SGPT) U/L 31     Results from last 7 days   Lab Units 03/08/24  0708 03/08/24  0340   CALCIUM mg/dL 9.3 9.1   ALBUMIN g/dL  --  3.9   MAGNESIUM mg/dL 2.2  --      Results from last 7 days   Lab Units 03/08/24  0340   PROCALCITONIN ng/mL 0.06   LACTATE mmol/L 0.9     No results found for: \"HGBA1C\", \"POCGLU\"    XR Chest 1 View    Result Date: 3/8/2024  No acute findings.  This report was finalized on 3/8/2024 4:20 AM by Dr. Rosa Sanders M.D on Workstation: [id number]       I have personally reviewed all medications:  Scheduled Medications  apixaban, 5 mg, Oral, Q12H  atorvastatin, 80 mg, Oral, Nightly  azithromycin, 500 mg, Intravenous, Q24H  budesonide-formoterol, 2 puff, Inhalation, BID - RT  famotidine, 20 mg, Oral, BID  fluticasone, 2 spray, Nasal, Daily  folic acid, 1 mg, Oral, Daily  guaiFENesin, 1,200 mg, Oral, Q12H  ipratropium-albuterol, 3 mL, Nebulization, Q4H - RT  metoprolol tartrate, 75 mg, Oral, BID  [START ON 3/10/2024] predniSONE, 40 mg, Oral, Daily With Breakfast  vitamin B-12, 1,000 mcg, Oral, Daily    Infusions   Diet  Diet: Cardiac; Healthy Heart (2-3 Na+); Fluid Consistency: Thin (IDDSI 0)    I have personally reviewed:  [x]  Laboratory   [x]  Microbiology   [x]  Radiology   [x]  EKG/Telemetry  []  Cardiology/Vascular   []  Pathology    [x]  Records    Assessment/Plan     Active Hospital Problems "    Diagnosis  POA    **COPD exacerbation [J44.1]  Yes    LV (left ventricular) mural thrombus [I51.3]  Yes    Atherosclerotic coronary vascular disease [I25.10]  Yes    Hypertension [I10]  Yes    Weight loss [R63.4]  Yes    Ischemic cardiomyopathy [I25.5]  Yes    Paroxysmal atrial fibrillation [I48.0]  Yes      Resolved Hospital Problems   No resolved problems to display.   COPD Exacerbation  - RVP negative, no evidence of bacterial pneumonia  - continue on IV steroids and scheduled nebulized bronchodilators-possibly switch to oral steroid tomorrow  - encourage aggressive pulmonary toilet- increase mucinex dose and add incentive spirometry and flutter valve  - continue short course of azithromycin for anti-inflammatory purposes  - appreciate pulmonology recs    HTN/PAF/CAD/ICM/Hx of LV Thrombus  - BP acceptable, HR controlled, no anginal symptoms  - continue metoprolol, lipitor, and eliquis      Eliquis (home med) for DVT prophylaxis.  Full code.  Discussed with patient and nursing staff.  Anticipate discharge home in 1-2 days.      Iain Bowen MD  [facility]  03/09/24  12:44 EST

## 2024-03-10 ENCOUNTER — READMISSION MANAGEMENT (OUTPATIENT)
Dept: CALL CENTER | Facility: HOSPITAL | Age: 79
End: 2024-03-10
Payer: MEDICARE

## 2024-03-10 VITALS
TEMPERATURE: 97.6 F | HEART RATE: 83 BPM | OXYGEN SATURATION: 99 % | DIASTOLIC BLOOD PRESSURE: 54 MMHG | BODY MASS INDEX: 20.26 KG/M2 | RESPIRATION RATE: 18 BRPM | WEIGHT: 149.6 LBS | HEIGHT: 72 IN | SYSTOLIC BLOOD PRESSURE: 104 MMHG

## 2024-03-10 LAB
ANION GAP SERPL CALCULATED.3IONS-SCNC: 11 MMOL/L (ref 5–15)
BASOPHILS # BLD AUTO: 0.02 10*3/MM3 (ref 0–0.2)
BASOPHILS NFR BLD AUTO: 0.2 % (ref 0–1.5)
BUN SERPL-MCNC: 26 MG/DL (ref 8–23)
BUN/CREAT SERPL: 29.5 (ref 7–25)
CALCIUM SPEC-SCNC: 8.3 MG/DL (ref 8.6–10.5)
CHLORIDE SERPL-SCNC: 104 MMOL/L (ref 98–107)
CO2 SERPL-SCNC: 19 MMOL/L (ref 22–29)
CREAT SERPL-MCNC: 0.88 MG/DL (ref 0.76–1.27)
DEPRECATED RDW RBC AUTO: 40.4 FL (ref 37–54)
EGFRCR SERPLBLD CKD-EPI 2021: 87.5 ML/MIN/1.73
EOSINOPHIL # BLD AUTO: 0.01 10*3/MM3 (ref 0–0.4)
EOSINOPHIL NFR BLD AUTO: 0.1 % (ref 0.3–6.2)
ERYTHROCYTE [DISTWIDTH] IN BLOOD BY AUTOMATED COUNT: 11.6 % (ref 12.3–15.4)
GLUCOSE SERPL-MCNC: 91 MG/DL (ref 65–99)
HCT VFR BLD AUTO: 36.6 % (ref 37.5–51)
HGB BLD-MCNC: 11.8 G/DL (ref 13–17.7)
IMM GRANULOCYTES # BLD AUTO: 0.05 10*3/MM3 (ref 0–0.05)
IMM GRANULOCYTES NFR BLD AUTO: 0.4 % (ref 0–0.5)
LYMPHOCYTES # BLD AUTO: 1.15 10*3/MM3 (ref 0.7–3.1)
LYMPHOCYTES NFR BLD AUTO: 9.5 % (ref 19.6–45.3)
MCH RBC QN AUTO: 30.9 PG (ref 26.6–33)
MCHC RBC AUTO-ENTMCNC: 32.2 G/DL (ref 31.5–35.7)
MCV RBC AUTO: 95.8 FL (ref 79–97)
MONOCYTES # BLD AUTO: 0.74 10*3/MM3 (ref 0.1–0.9)
MONOCYTES NFR BLD AUTO: 6.1 % (ref 5–12)
NEUTROPHILS NFR BLD AUTO: 10.15 10*3/MM3 (ref 1.7–7)
NEUTROPHILS NFR BLD AUTO: 83.7 % (ref 42.7–76)
NRBC BLD AUTO-RTO: 0 /100 WBC (ref 0–0.2)
PLATELET # BLD AUTO: 159 10*3/MM3 (ref 140–450)
PMV BLD AUTO: 10.1 FL (ref 6–12)
POTASSIUM SERPL-SCNC: 4.5 MMOL/L (ref 3.5–5.2)
RBC # BLD AUTO: 3.82 10*6/MM3 (ref 4.14–5.8)
SODIUM SERPL-SCNC: 134 MMOL/L (ref 136–145)
WBC NRBC COR # BLD AUTO: 12.12 10*3/MM3 (ref 3.4–10.8)

## 2024-03-10 PROCEDURE — 80048 BASIC METABOLIC PNL TOTAL CA: CPT | Performed by: INTERNAL MEDICINE

## 2024-03-10 PROCEDURE — 94660 CPAP INITIATION&MGMT: CPT

## 2024-03-10 PROCEDURE — 94799 UNLISTED PULMONARY SVC/PX: CPT

## 2024-03-10 PROCEDURE — 63710000001 PREDNISONE PER 1 MG: Performed by: STUDENT IN AN ORGANIZED HEALTH CARE EDUCATION/TRAINING PROGRAM

## 2024-03-10 PROCEDURE — 94761 N-INVAS EAR/PLS OXIMETRY MLT: CPT

## 2024-03-10 PROCEDURE — 85025 COMPLETE CBC W/AUTO DIFF WBC: CPT | Performed by: INTERNAL MEDICINE

## 2024-03-10 PROCEDURE — 97110 THERAPEUTIC EXERCISES: CPT

## 2024-03-10 RX ORDER — PREDNISONE 20 MG/1
40 TABLET ORAL
Qty: 8 TABLET | Refills: 0 | Status: SHIPPED | OUTPATIENT
Start: 2024-03-11 | End: 2024-03-15

## 2024-03-10 RX ORDER — PREDNISONE 5 MG/1
5 TABLET ORAL DAILY
Start: 2024-03-15

## 2024-03-10 RX ORDER — SODIUM CHLORIDE FOR INHALATION 7 %
4 VIAL, NEBULIZER (ML) INHALATION
Status: DISCONTINUED | OUTPATIENT
Start: 2024-03-10 | End: 2024-03-10 | Stop reason: HOSPADM

## 2024-03-10 RX ORDER — BUDESONIDE AND FORMOTEROL FUMARATE DIHYDRATE 160; 4.5 UG/1; UG/1
2 AEROSOL RESPIRATORY (INHALATION)
Qty: 10.2 G | Refills: 0 | Status: SHIPPED | OUTPATIENT
Start: 2024-03-10

## 2024-03-10 RX ADMIN — IPRATROPIUM BROMIDE AND ALBUTEROL SULFATE 3 ML: .5; 3 SOLUTION RESPIRATORY (INHALATION) at 10:01

## 2024-03-10 RX ADMIN — METOPROLOL TARTRATE 75 MG: 25 TABLET, FILM COATED ORAL at 08:38

## 2024-03-10 RX ADMIN — FLUTICASONE PROPIONATE 2 SPRAY: 50 SPRAY, METERED NASAL at 08:39

## 2024-03-10 RX ADMIN — FAMOTIDINE 20 MG: 20 TABLET, FILM COATED ORAL at 08:38

## 2024-03-10 RX ADMIN — Medication 4 ML: at 14:27

## 2024-03-10 RX ADMIN — GUAIFENESIN 1200 MG: 600 TABLET, EXTENDED RELEASE ORAL at 08:38

## 2024-03-10 RX ADMIN — IPRATROPIUM BROMIDE AND ALBUTEROL SULFATE 3 ML: .5; 3 SOLUTION RESPIRATORY (INHALATION) at 06:55

## 2024-03-10 RX ADMIN — PREDNISONE 40 MG: 20 TABLET ORAL at 08:38

## 2024-03-10 RX ADMIN — IPRATROPIUM BROMIDE AND ALBUTEROL SULFATE 3 ML: .5; 3 SOLUTION RESPIRATORY (INHALATION) at 14:27

## 2024-03-10 RX ADMIN — BUDESONIDE AND FORMOTEROL FUMARATE DIHYDRATE 2 PUFF: 160; 4.5 AEROSOL RESPIRATORY (INHALATION) at 06:55

## 2024-03-10 RX ADMIN — IPRATROPIUM BROMIDE AND ALBUTEROL SULFATE 3 ML: .5; 3 SOLUTION RESPIRATORY (INHALATION) at 03:34

## 2024-03-10 RX ADMIN — Medication 1000 MCG: at 08:38

## 2024-03-10 RX ADMIN — FOLIC ACID 1 MG: 1 TABLET ORAL at 08:38

## 2024-03-10 RX ADMIN — APIXABAN 5 MG: 5 TABLET, FILM COATED ORAL at 08:39

## 2024-03-10 NOTE — DISCHARGE SUMMARY
"Date of Admission: 3/8/2024  Date of Discharge:  3/10/2024  Primary Care Physician: System, Provider Not In     Discharge Diagnosis:  Active Hospital Problems    Diagnosis  POA    **COPD exacerbation [J44.1]  Yes    LV (left ventricular) mural thrombus [I51.3]  Yes    Atherosclerotic coronary vascular disease [I25.10]  Yes    Hypertension [I10]  Yes    Weight loss [R63.4]  Yes    Ischemic cardiomyopathy [I25.5]  Yes    Paroxysmal atrial fibrillation [I48.0]  Yes      Resolved Hospital Problems   No resolved problems to display.       Presenting Problem/History of Present Illness:  COPD with acute exacerbation [J44.1]  Acute respiratory failure, unspecified whether with hypoxia or hypercapnia [J96.00]     Hospital Course:  The patient is a 78 y.o. male with a history of COPD, PAF, LV thrombus, ICM, and HTN who presented with cough and dyspnea. Please see admission H&P for further details. He was found to have a COPD exacerbation. He had no evidence of viral or bacterial pneumonia. He was started on steroids, bronchodilators, azithromycin, and pulmonary toilet measures. His symptoms improved and he feels as though he is back to baseline. He has been on room air for over 24 hours. He is medically stable and will be discharged home on a few more days of prednisone 40mg before returning to his usual dose.    Exam Today:  Blood pressure 104/54, pulse 77, temperature 97.6 °F (36.4 °C), temperature source Oral, resp. rate 20, height 182.9 cm (72\"), weight 67.9 kg (149 lb 9.6 oz), SpO2 90%.  Vitals and nursing note reviewed.   Constitutional:       General: He is not in acute distress.     Appearance: He appears chronically ill. He is not toxic-appearing or diaphoretic.   HENT:      Head: Normocephalic and atraumatic.      Nose: Nose normal.      Mouth/Throat:      Mouth: Mucous membranes are moist.      Pharynx: Oropharynx is clear.   Eyes:      Conjunctiva/sclera: Conjunctivae normal.      Pupils: Pupils are equal, round, " and reactive to light.   Cardiovascular:      Rate and Rhythm: Normal rate and regular rhythm.      Pulses: Normal pulses.   Pulmonary:      Effort: Pulmonary effort is normal.      Breath sounds: Scattered rhonchi present. No wheezing. Air movement is much improved.  Abdominal:      General: Bowel sounds are normal. There is no distension.      Palpations: Abdomen is soft.      Tenderness: There is no abdominal tenderness.   Musculoskeletal:         General: No swelling or tenderness.      Cervical back: Neck supple.   Skin:     General: Skin is warm and dry.      Capillary Refill: Capillary refill takes less than 2 seconds.   Neurological:      General: No focal deficit present.      Mental Status: He is alert and oriented to person, place, and time.   Psychiatric:         Mood and Affect: Mood normal.         Behavior: Behavior normal.       Consults:   Consults       Date and Time Order Name Status Description    3/8/2024  8:57 AM Inpatient Pulmonology Consult Completed              Discharge Disposition:  Home or Self Care    Discharge Medications:     Discharge Medications        New Medications        Instructions Start Date   budesonide-formoterol 160-4.5 MCG/ACT inhaler  Commonly known as: SYMBICORT   2 puffs, Inhalation, 2 Times Daily - RT             Changes to Medications        Instructions Start Date   predniSONE 20 MG tablet  Commonly known as: DELTASONE  What changed: You were already taking a medication with the same name, and this prescription was added. Make sure you understand how and when to take each.   40 mg, Oral, Daily With Breakfast   Start Date: March 11, 2024     predniSONE 5 MG tablet  Commonly known as: DELTASONE  What changed: These instructions start on March 15, 2024. If you are unsure what to do until then, ask your doctor or other care provider.   5 mg, Oral, Daily   Start Date: March 15, 2024            Continue These Medications        Instructions Start Date   albuterol sulfate   (90 Base) MCG/ACT inhaler  Commonly known as: PROVENTIL HFA;VENTOLIN HFA;PROAIR HFA   2 puffs, Inhalation, Every 4 Hours PRN      apixaban 5 MG tablet tablet  Commonly known as: ELIQUIS   5 mg, Oral, Every 12 Hours Scheduled      aspirin 81 MG EC tablet   81 mg, Oral, Daily      atorvastatin 80 MG tablet  Commonly known as: LIPITOR   80 mg, Oral, Daily      dextromethorphan-guaifenesin  MG/5ML syrup  Commonly known as: ROBITUSSIN-DM   5 mL, Oral, Every 6 Hours PRN      famotidine 20 MG tablet  Commonly known as: PEPCID   20 mg, Oral, 2 Times Daily      fluticasone 50 MCG/ACT nasal spray  Commonly known as: FLONASE   2 sprays, Nasal, Daily      folic acid 1 MG tablet  Commonly known as: FOLVITE   1 mg, Oral, Daily      guaiFENesin 600 MG 12 hr tablet  Commonly known as: MUCINEX   1,200 mg, Oral, 2 Times Daily      ipratropium-albuterol  MCG/ACT inhaler  Commonly known as: COMBIVENT RESPIMAT   1 puff, Inhalation, 4 Times Daily PRN      metoprolol tartrate 50 MG tablet  Commonly known as: LOPRESSOR   75 mg, Oral, 2 Times Daily      polyethylene glycol 17 g packet  Commonly known as: MIRALAX   17 g, Oral, Daily      sacubitril-valsartan 24-26 MG tablet  Commonly known as: ENTRESTO   1 tablet, Oral, 2 Times Daily      tiotropium bromide monohydrate 2.5 MCG/ACT aerosol solution inhaler  Commonly known as: SPIRIVA RESPIMAT   2 puffs, Inhalation, Daily - RT      vitamin B-12 500 MCG tablet  Commonly known as: CYANOCOBALAMIN   1,000 mcg, Oral, Daily      vitamin D 1.25 MG (89376 UT) capsule capsule  Commonly known as: ERGOCALCIFEROL   50,000 Units, Oral, Weekly               Discharge Diet:   Diet Instructions       Diet: Cardiac Diets; Healthy Heart (2-3 Na+); Regular (IDDSI 7); Thin (IDDSI 0)      Discharge Diet: Cardiac Diets    Cardiac Diet: Healthy Heart (2-3 Na+)    Texture: Regular (IDDSI 7)    Fluid Consistency: Thin (IDDSI 0)            Activity at Discharge:   Activity Instructions        Activity as Tolerated              Follow-up Appointments:  No future appointments.  Additional Instructions for the Follow-ups that You Need to Schedule       Discharge Follow-up with PCP   As directed       Currently Documented PCP:    System, Provider Not In    PCP Phone Number:    None     Follow Up Details: 1 week                Test Results Pending at Discharge:  Pending Labs       Order Current Status    Blood Culture - Blood, Arm, Right Preliminary result    Blood Culture - Blood, Hand, Left Preliminary result             Iain Bowen MD  03/10/24  14:10 EDT    Time Spent on Discharge Activities: Greater than 30 minutes.

## 2024-03-10 NOTE — THERAPY TREATMENT NOTE
Patient Name: Wisam Malone  : 1945    MRN: [id number]                              Today's Date: 3/10/2024       Admit Date: 3/8/2024    Visit Dx:     ICD-10-CM ICD-9-CM   1. COPD with acute exacerbation  J44.1 491.21   2. Acute respiratory failure, unspecified whether with hypoxia or hypercapnia  J96.00 518.81     Patient Active Problem List   Diagnosis    COPD exacerbation    Atherosclerotic coronary vascular disease    Hypertension    Weight loss    Ischemic cardiomyopathy    Paroxysmal atrial fibrillation    LV (left ventricular) mural thrombus     Past Medical History:   Diagnosis Date    COPD (chronic obstructive pulmonary disease)     Elevated cholesterol     GERD (gastroesophageal reflux disease)     Hypertension      Past Surgical History:   Procedure Laterality Date    CORONARY ARTERY BYPASS GRAFT        General Information       Row Name 03/10/24 1112          Physical Therapy Time and Intention    Document Type therapy note (daily note)  -MS     Mode of Treatment physical therapy  -MS       Row Name 03/10/24 1112          General Information    Existing Precautions/Restrictions fall  -MS       Row Name 03/10/24 1112          Cognition    Orientation Status (Cognition) oriented x 3  -MS       Row Name 03/10/24 1112          Safety Issues, Functional Mobility    Impairments Affecting Function (Mobility) endurance/activity tolerance;shortness of breath;strength;balance  -MS               User Key  (r) = Recorded By, (t) = Taken By, (c) = Cosigned By      Initials Name Provider Type    Magaly Lozada PT Physical Therapist                   Mobility       Row Name 03/10/24 1112          Bed Mobility    Bed Mobility supine-sit;sit-supine  -MS     Supine-Sit [place] (Bed Mobility) standby assist  -MS     Sit-Supine [place] (Bed Mobility) standby assist  -MS       Row Name 03/10/24 1112          Sit-Stand Transfer    Sit-Stand [place] (Transfers) standby assist;verbal cues   -MS       Row Name 03/10/24 1112          Gait/Stairs (Locomotion)    [place] Level (Gait) contact guard;verbal cues;nonverbal cues (demo/gesture)  -MS     Distance in Feet (Gait) 25  -MS     Comment, (Gait/Stairs) One mild LOB with turn. SOA but vitals WFL. Declined further trials due to fatigue.  -MS               User Key  (r) = Recorded By, (t) = Taken By, (c) = Cosigned By      Initials Name Provider Type    Magaly Lozada PT Physical Therapist                   Obj/Interventions       Row Name 03/10/24 1113          Balance    Static Sitting Balance standby assist  -MS     Position, Sitting Balance sitting edge of bed  -MS     Static Standing Balance contact guard  -MS     Dynamic Standing Balance minimal assist  -MS     Position/Device Used, Standing Balance unsupported  -MS               User Key  (r) = Recorded By, (t) = Taken By, (c) = Cosigned By      Initials Name Provider Type    Magaly Lozada, PT Physical Therapist                   Goals/Plan    No documentation.                  Clinical Impression       Row Name 03/10/24 1113          Pain    Pretreatment Pain Rating 0/10 - no pain  -MS     Posttreatment Pain Rating 0/10 - no pain  -MS       Row Name 03/10/24 1113          Plan of Care Review    Plan of Care Reviewed With patient  -MS     Outcome Evaluation Patient resting in bed upon arrival and agreeable to PT with encouragement. SBA for transfers and ambulated 25' CGA without any AD. One mild LOB with ambulation turn but able to self correct. Noted patient SOA with vitals WFL. Reviewed energy conservation techniques. Patient irritable throughout session and repeatedly stating he wanted to DC home. Patient confirmed there are no stairs within his living area at home and he has family and friends that plan to assist him. PT will continue to follow and advance as able. Patient did mention he had access to a RW and was encouraged to use to assist with balance and endurance. PT  will continue to follow.  -MS       Row Name 03/10/24 1113          Vital Signs    Pre Systolic BP Rehab 109  -MS     Pre Treatment Diastolic BP 52  -MS     Pretreatment Heart Rate (beats/min) 78  -MS     Pre SpO2 (%) 94  -MS       Row Name 03/10/24 1113          Positioning and Restraints    Pre-Treatment Position in bed  -MS     Post Treatment Position bed  -MS     In Bed notified nsg;fowlers;call light within reach;encouraged to call for assist;exit alarm on  -MS               User Key  (r) = Recorded By, (t) = Taken By, (c) = Cosigned By      Initials Name Provider Type    Magaly Lozada, PT Physical Therapist                   Outcome Measures       Row Name 03/10/24 1117          How much help from another person do you currently need...    Turning from your back to your side while in flat bed without using bedrails? 4  -MS     Moving from lying on back to sitting on the side of a flat bed without bedrails? 3  -MS     Moving to and from a bed to a chair (including a wheelchair)? 3  -MS     Standing up from a chair using your arms (e.g., wheelchair, bedside chair)? 3  -MS     Climbing 3-5 steps with a railing? 2  -MS     To walk in hospital room? 3  -MS     AM-PAC 6 Clicks Score (PT) 18  -MS     Highest Level of Mobility Goal 6 --> Walk 10 steps or more  -MS               User Key  (r) = Recorded By, (t) = Taken By, (c) = Cosigned By      Initials Name Provider Type    Magaly Lozada PT Physical Therapist                                 Physical Therapy Education       Title: PT OT SLP Therapies (In Progress)       Topic: Physical Therapy (In Progress)       Point: Mobility training (Done)       Learning Progress Summary             Patient Acceptance, E,TB, VU,NR by CB at 3/8/2024 3566                         Point: Home exercise program (Not Started)       Learner Progress:  Not documented in this visit.              Point: Body mechanics (Done)       Learning Progress Summary              Patient Acceptance, E,TB, VU,NR by CB at 3/8/2024 1535                         Point: Precautions (Done)       Learning Progress Summary             Patient Acceptance, E,TB, VU,NR by CB at 3/8/2024 1535                                         User Key       Initials Effective Dates Name Provider Type Discipline     10/22/21 -  Jacquelin Ramirez, PT Physical Therapist PT                  PT Recommendation and Plan     Plan of Care Reviewed With: patient  Outcome Evaluation: Patient resting in bed upon arrival and agreeable to PT with encouragement. SBA for transfers and ambulated 25' CGA without any AD. One mild LOB with ambulation turn but able to self correct. Noted patient SOA with vitals WFL. Reviewed energy conservation techniques. Patient irritable throughout session and repeatedly stating he wanted to DC home. Patient confirmed there are no stairs within his living area at home and he has family and friends that plan to assist him. PT will continue to follow and advance as able. Patient did mention he had access to a RW and was encouraged to use to assist with balance and endurance. PT will continue to follow.     Time Calculation:         PT Charges       Row Name 03/10/24 1112             Time Calculation    Start Time 1010  -MS      Stop Time 1026  -MS      Time Calculation (min) 16 min  -MS      PT Received On 03/10/24  -MS      PT - Next Appointment 03/11/24  -MS                User Key  (r) = Recorded By, (t) = Taken By, (c) = Cosigned By      Initials Name Provider Type    MS Magaly Cheney PT Physical Therapist                  Therapy Charges for Today       Code Description Service Date Service Provider Modifiers Qty    20553775073 HC PT THER PROC EA 15 MIN 3/10/2024 Magaly Cheney, PT GP 1    41988205372 HC PT THER SUPP EA 15 MIN 3/10/2024 Magaly Cheney PT GP 1            PT G-Codes  Outcome Measure Options: AM-PAC 6 Clicks Basic Mobility (PT)  AM-PAC 6 Clicks Score (PT): 18  PT  Discharge Summary  Anticipated Discharge Disposition (PT): home with assist, home    Magaly Cheney, PT  3/10/2024

## 2024-03-10 NOTE — PLAN OF CARE
Goal Outcome Evaluation:      Patient resting in bed , denies pain nor discomfort, vital signs stable, pt uses her Bi-Pap  at night ,antibiotics given as ordered,  bed alarm intact call light in reach , educate to call for assist, cont to monitor.

## 2024-03-10 NOTE — PROGRESS NOTES
Name: Wisam Malone ADMIT: 3/8/2024   : 1945  PCP: System, Provider Not In    MRN: [id number] LOS: 2 days   AGE/SEX: 78 y.o. male  ROOM: [facility]     Subjective   Subjective   No acute events. Patient is feeling better overall. Dyspnea is improving. Still having a productive cough. Taking PO. No CP/d/c/n/v/d.  No family at bedside.    Objective   Objective   Vital Signs  Temp:  [97.1 °F (36.2 °C)-98.1 °F (36.7 °C)] 97.6 °F (36.4 °C)  Heart Rate:  [73-90] 77  Resp:  [18-24] 20  BP: (104-127)/(50-66) 104/54  SpO2:  [88 %-100 %] 90 %  on  Flow (L/min):  [2] 2;   Device (Oxygen Therapy): room air  Body mass index is 20.29 kg/m².  Physical Exam  Vitals and nursing note reviewed.   Constitutional:       General: He is not in acute distress.     Appearance: He is ill-appearing (chronically). He is not toxic-appearing or diaphoretic.   HENT:      Head: Normocephalic and atraumatic.      Nose: Nose normal.      Mouth/Throat:      Mouth: Mucous membranes are moist.      Pharynx: Oropharynx is clear.   Eyes:      Conjunctiva/sclera: Conjunctivae normal.      Pupils: Pupils are equal, round, and reactive to light.   Cardiovascular:      Rate and Rhythm: Normal rate and regular rhythm.      Pulses: Normal pulses.   Pulmonary:      Effort: Pulmonary effort is normal.      Breath sounds: Rhonchi present. No wheezing.   Abdominal:      General: Bowel sounds are normal. There is no distension.      Palpations: Abdomen is soft.      Tenderness: There is no abdominal tenderness.   Musculoskeletal:         General: No swelling or tenderness.      Cervical back: Neck supple.   Skin:     General: Skin is warm and dry.      Capillary Refill: Capillary refill takes less than 2 seconds.   Neurological:      General: No focal deficit present.      Mental Status: He is alert and oriented to person, place, and time.   Psychiatric:         Mood and Affect: Mood normal.         Behavior: Behavior normal.     Results Review     I  "reviewed the patient's new clinical results.  Results from last 7 days   Lab Units 03/10/24  0542 03/08/24  0708 03/08/24  0340   WBC 10*3/mm3 12.12* 11.38* 8.68   HEMOGLOBIN g/dL 11.8* 14.0 14.2   PLATELETS 10*3/mm3 159 223 239     Results from last 7 days   Lab Units 03/10/24  0542 03/08/24  0708 03/08/24  0340   SODIUM mmol/L 134* 138 138   POTASSIUM mmol/L 4.5 4.9 4.8   CHLORIDE mmol/L 104 100 100   CO2 mmol/L 19.0* 29.2* 27.8   BUN mg/dL 26* 15 15   CREATININE mg/dL 0.88 0.92 0.92   GLUCOSE mg/dL 91 113* 118*   EGFR mL/min/1.73 87.5 84.6 84.6     Results from last 7 days   Lab Units 03/08/24  0340   ALBUMIN g/dL 3.9   BILIRUBIN mg/dL 0.4   ALK PHOS U/L 127*   AST (SGOT) U/L 25   ALT (SGPT) U/L 31     Results from last 7 days   Lab Units 03/10/24  0542 03/08/24  0708 03/08/24  0340   CALCIUM mg/dL 8.3* 9.3 9.1   ALBUMIN g/dL  --   --  3.9   MAGNESIUM mg/dL  --  2.2  --      Results from last 7 days   Lab Units 03/08/24  0340   PROCALCITONIN ng/mL 0.06   LACTATE mmol/L 0.9     No results found for: \"HGBA1C\", \"POCGLU\"    No radiology results for the last day    I have personally reviewed all medications:  Scheduled Medications  apixaban, 5 mg, Oral, Q12H  atorvastatin, 80 mg, Oral, Nightly  azithromycin, 500 mg, Intravenous, Q24H  budesonide-formoterol, 2 puff, Inhalation, BID - RT  famotidine, 20 mg, Oral, BID  fluticasone, 2 spray, Nasal, Daily  folic acid, 1 mg, Oral, Daily  guaiFENesin, 1,200 mg, Oral, Q12H  ipratropium-albuterol, 3 mL, Nebulization, Q4H - RT  metoprolol tartrate, 75 mg, Oral, BID  predniSONE, 40 mg, Oral, Daily With Breakfast  sodium chloride, 4 mL, Nebulization, BID - RT  vitamin B-12, 1,000 mcg, Oral, Daily    Infusions   Diet  Diet: Cardiac; Healthy Heart (2-3 Na+); Fluid Consistency: Thin (IDDSI 0)    I have personally reviewed:  [x]  Laboratory   [x]  Microbiology   [x]  Radiology   [x]  EKG/Telemetry  []  Cardiology/Vascular   []  Pathology    []  Records    Assessment/Plan     Active " Hospital Problems    Diagnosis  POA    **COPD exacerbation [J44.1]  Yes    LV (left ventricular) mural thrombus [I51.3]  Yes    Atherosclerotic coronary vascular disease [I25.10]  Yes    Hypertension [I10]  Yes    Weight loss [R63.4]  Yes    Ischemic cardiomyopathy [I25.5]  Yes    Paroxysmal atrial fibrillation [I48.0]  Yes      Resolved Hospital Problems   No resolved problems to display.   COPD Exacerbation  - RVP negative, no evidence of bacterial pneumonia  - switch steroids to PO-continue scheduled nebulized bronchodilators  - encourage aggressive pulmonary toilet- continue mucinex and incentive spirometry with flutter valve-add saline nebs  - continue short course of azithromycin for anti-inflammatory purposes  - appreciate pulmonology recs    HTN/PAF/CAD/ICM/Hx of LV Thrombus  - BP acceptable, HR controlled, no anginal symptoms  - continue metoprolol, lipitor, and eliquis      Eliquis (home med) for DVT prophylaxis.  Full code.  Discussed with patient and nursing staff.  Anticipate discharge home tomorrow.      Iain Bowen MD  [facility]  03/10/24  12:05 EDT

## 2024-03-10 NOTE — CASE MANAGEMENT/SOCIAL WORK
Case Management Discharge Note      Final Note: [facility] with [facility]    Provided Post Acute Provider List?: N/A  Provided Post Acute Provider Quality & Resource List?: N/A    Selected Continued Care - Discharged on 3/10/2024 Admission date: 3/8/2024 - Discharge disposition: Home or Self Care      Destination    No services have been selected for the patient.                Durable Medical Equipment    No services have been selected for the patient.                Dialysis/Infusion    No services have been selected for the patient.                Home Medical Care    No services have been selected for the patient.                Therapy    No services have been selected for the patient.                Community Resources    No services have been selected for the patient.                Community & DME    No services have been selected for the patient.                         Final Discharge Disposition Code: 01 - home or self-care

## 2024-03-10 NOTE — PLAN OF CARE
Goal Outcome Evaluation:  Plan of Care Reviewed With: patient           Outcome Evaluation: Patient resting in bed upon arrival and agreeable to PT with encouragement. SBA for transfers and ambulated 25' CGA without any AD. One mild LOB with ambulation turn but able to self correct. Noted patient SOA with vitals WFL. Reviewed energy conservation techniques. Patient irritable throughout session and repeatedly stating he wanted to DC home. Patient confirmed there are no stairs within his living area at home and he has family and friends that plan to assist him. PT will continue to follow and advance as able. Patient did mention he had access to a RW and was encouraged to use to assist with balance and endurance. PT will continue to follow.      Anticipated Discharge Disposition (PT): home with assist, home

## 2024-03-10 NOTE — PROGRESS NOTES
[facility]  [phone]  Dr. Galo Garcia    Subjective:  LOS: 2    Chief Complaint:      No acute events overnight.  Patient has been on room air for about 24 hours.  He feels like he is close to baseline.  He reports that he has family at home that can help him if needed and is ready for discharge today.      Objective   Vital Signs past 24hrs    Temp range: Temp (24hrs), Av.7 °F (36.5 °C), Min:97.1 °F (36.2 °C), Max:98.1 °F (36.7 °C)    BP range: BP: (104-127)/(50-66) 104/54  Pulse range: Heart Rate:  [73-90] 77  Resp rate range: Resp:  [18-24] 20    Device (Oxygen Therapy): room airFlow (L/min):  [2] 2  Oxygen range:SpO2:  [88 %-100 %] 90 %      67.9 kg (149 lb 9.6 oz); Body mass index is 20.29 kg/m².    Intake/Output Summary (Last 24 hours) at 3/10/2024 1345  Last data filed at 3/10/2024 1300  Gross per 24 hour   Intake 360 ml   Output 400 ml   Net -40 ml         Physical Exam  Constitutional:       Appearance: Normal appearance.   HENT:      Head: Normocephalic and atraumatic.      Nose: Nose normal.      Mouth/Throat:      Mouth: Mucous membranes are moist.      Pharynx: Oropharynx is clear.   Eyes:      Extraocular Movements: Extraocular movements intact.      Conjunctiva/sclera: Conjunctivae normal.   Cardiovascular:      Rate and Rhythm: Normal rate and regular rhythm.      Pulses: Normal pulses.      Heart sounds: Normal heart sounds. No murmur heard.  Pulmonary:      Effort: Pulmonary effort is normal. No respiratory distress.      Breath sounds: Normal breath sounds. No stridor. No wheezing or rales.   Abdominal:      General: Abdomen is flat. Bowel sounds are normal.      Palpations: Abdomen is soft.      Tenderness: There is no abdominal tenderness.   Skin:     General: Skin is warm and dry.   Neurological:      General: No focal deficit present.      Mental Status: He is alert and oriented to person, place, and time.       Results Review:    I have reviewed the laboratory and  imaging data since the last note by LPC physician.  My annotations are noted in assessment and plan.    Lab Results (last 24 hours)       Procedure Component Value Units Date/Time    CBC & Differential [[id number]]  (Abnormal) Collected: 03/10/24 0542    Specimen: Blood Updated: 03/10/24 0634    Narrative:      The following orders were created for panel order CBC & Differential.  Procedure                               Abnormality         Status                     ---------                               -----------         ------                     CBC Auto Differential[[id number]]        Abnormal            Final result                 Please view results for these tests on the individual orders.    Basic Metabolic Panel [[id number]]  (Abnormal) Collected: 03/10/24 0542    Specimen: Blood Updated: 03/10/24 0648     Glucose 91 mg/dL      BUN 26 mg/dL      Creatinine 0.88 mg/dL      Sodium 134 mmol/L      Potassium 4.5 mmol/L      Comment: Slight hemolysis detected by analyzer. Result may be falsely elevated.        Chloride 104 mmol/L      CO2 19.0 mmol/L      Calcium 8.3 mg/dL      BUN/Creatinine Ratio 29.5     Anion Gap 11.0 mmol/L      eGFR 87.5 mL/min/1.73     Narrative:      GFR Normal >60  Chronic Kidney Disease <60  Kidney Failure <15    The GFR formula is only valid for adults with stable renal function between ages 18 and 70.    CBC Auto Differential [[id number]]  (Abnormal) Collected: 03/10/24 0542    Specimen: Blood Updated: 03/10/24 0634     WBC 12.12 10*3/mm3      RBC 3.82 10*6/mm3      Hemoglobin 11.8 g/dL      Hematocrit 36.6 %      MCV 95.8 fL      MCH 30.9 pg      MCHC 32.2 g/dL      RDW 11.6 %      RDW-SD 40.4 fl      MPV 10.1 fL      Platelets 159 10*3/mm3      Neutrophil % 83.7 %      Lymphocyte % 9.5 %      Monocyte % 6.1 %      Eosinophil % 0.1 %      Basophil % 0.2 %      Immature Grans % 0.4 %      Neutrophils, Absolute 10.15 10*3/mm3      Lymphocytes, Absolute 1.15 10*3/mm3      Monocytes,  Absolute 0.74 10*3/mm3      Eosinophils, Absolute 0.01 10*3/mm3      Basophils, Absolute 0.02 10*3/mm3      Immature Grans, Absolute 0.05 10*3/mm3      nRBC 0.0 /100 WBC           No radiology results for the last day    Lab Results (last 24 hours)       Procedure Component Value Units Date/Time    Blood Culture - Blood, Hand, Left [[id number]]  (Normal) Collected: 03/08/24 0712    Specimen: Blood from Hand, Left Updated: 03/10/24 0830     Blood Culture No growth at 2 days    Narrative:      Less than seven (7) mL's of blood was collected.  Insufficient quantity may yield false negative results.    Blood Culture - Blood, Arm, Right [[id number]]  (Normal) Collected: 03/08/24 0708    Specimen: Blood from Arm, Right Updated: 03/10/24 0830     Blood Culture No growth at 2 days    Narrative:      Less than seven (7) mL's of blood was collected.  Insufficient quantity may yield false negative results.    Basic Metabolic Panel [[id number]]  (Abnormal) Collected: 03/10/24 0542    Specimen: Blood Updated: 03/10/24 0648     Glucose 91 mg/dL      BUN 26 mg/dL      Creatinine 0.88 mg/dL      Sodium 134 mmol/L      Potassium 4.5 mmol/L      Comment: Slight hemolysis detected by analyzer. Result may be falsely elevated.        Chloride 104 mmol/L      CO2 19.0 mmol/L      Calcium 8.3 mg/dL      BUN/Creatinine Ratio 29.5     Anion Gap 11.0 mmol/L      eGFR 87.5 mL/min/1.73     Narrative:      GFR Normal >60  Chronic Kidney Disease <60  Kidney Failure <15    The GFR formula is only valid for adults with stable renal function between ages 18 and 70.    CBC & Differential [[id number]]  (Abnormal) Collected: 03/10/24 0542    Specimen: Blood Updated: 03/10/24 0634    Narrative:      The following orders were created for panel order CBC & Differential.  Procedure                               Abnormality         Status                     ---------                               -----------         ------                     CBC Auto  Differential[[id number]]        Abnormal            Final result                 Please view results for these tests on the individual orders.    CBC Auto Differential [[id number]]  (Abnormal) Collected: 03/10/24 0542    Specimen: Blood Updated: 03/10/24 0634     WBC 12.12 10*3/mm3      RBC 3.82 10*6/mm3      Hemoglobin 11.8 g/dL      Hematocrit 36.6 %      MCV 95.8 fL      MCH 30.9 pg      MCHC 32.2 g/dL      RDW 11.6 %      RDW-SD 40.4 fl      MPV 10.1 fL      Platelets 159 10*3/mm3      Neutrophil % 83.7 %      Lymphocyte % 9.5 %      Monocyte % 6.1 %      Eosinophil % 0.1 %      Basophil % 0.2 %      Immature Grans % 0.4 %      Neutrophils, Absolute 10.15 10*3/mm3      Lymphocytes, Absolute 1.15 10*3/mm3      Monocytes, Absolute 0.74 10*3/mm3      Eosinophils, Absolute 0.01 10*3/mm3      Basophils, Absolute 0.02 10*3/mm3      Immature Grans, Absolute 0.05 10*3/mm3      nRBC 0.0 /100 WBC           Imaging Results (Last 24 Hours)       ** No results found for the last 24 hours. **            Result Review:  I have personally reviewed the results from last note by LPC physician to 3/10/2024 13:45 EDT and agree with these findings:  [x]  Laboratory list / accordion  [x]  Microbiology  [x]  Radiology  []  EKG/Telemetry   []  Cardiology/Vascular   []  Pathology  []  Old records  []  Other:    Medication Review:  I have reviewed the current MAR.  My annotations are noted in assessment and plan.    apixaban, 5 mg, Oral, Q12H  atorvastatin, 80 mg, Oral, Nightly  azithromycin, 500 mg, Intravenous, Q24H  budesonide-formoterol, 2 puff, Inhalation, BID - RT  famotidine, 20 mg, Oral, BID  fluticasone, 2 spray, Nasal, Daily  folic acid, 1 mg, Oral, Daily  guaiFENesin, 1,200 mg, Oral, Q12H  ipratropium-albuterol, 3 mL, Nebulization, Q4H - RT  metoprolol tartrate, 75 mg, Oral, BID  predniSONE, 40 mg, Oral, Daily With Breakfast  sodium chloride, 4 mL, Nebulization, BID - RT  vitamin B-12, 1,000 mcg, Oral, Daily           Lines,  Drains & Airways       Active LDAs       Name Placement date Placement time Site Days    Peripheral IV 03/08/24 0338 Left Antecubital 03/08/24  0338  Antecubital  1                  No active isolations  Diet Orders (active) (From admission, onward)       Start     Ordered    03/08/24 0518  Diet: Cardiac; Healthy Heart (2-3 Na+); Fluid Consistency: Thin (IDDSI 0)  Diet Effective Now         03/08/24 0519                  PCCM Problems  Acute COPD exacerbation  Acute hypoxic respiratory failure  Chronic hypercapnic respiratory failure on Bipap  Former smoker  Leukocytosis    Relevant Medical Diagnoses  CAD   Hypertension  Ischemic cardiomyopathy  A-fib  AAA (4 cm on CTA chest 11/17/23)        Plan of Treatment  -From a pulmonary perspective okay for discharge today (3/10/2024)  - Recommend to finish 5 more days prednisone 40 mg daily (last dose 3/14/24).  After finishing the steroid burst, he can continue his home prednisone 5 mg daily [facility].  - Complete 3 days azithromycin 500 mg daily (last dose 3/10/24). Last dose due around 6 pm, okay to give this early prior to discharge from my perspective.  - Goal O2 sats 88 to 92%, wean O2 as tolerated  - Has remained on room air for about 24 hours.  Does not need O2 eval prior to discharge.  If he were to need oxygen, he has an oxygen concentrator at home.  - Okay to continue use trilogy vent at home for severe COPD      Galo Garcia MD  03/10/24  13:45 EDT      Part of this note may be an electronic transcription/translation of spoken language to printed text using the Dragon Dictation System.

## 2024-03-11 NOTE — OUTREACH NOTE
Prep Survey      Flowsheet Row Responses   [facility] patient discharged from? [place]   Is LACE score < 7 ? No   Eligibility Readm Mgmt   Discharge diagnosis COPD exacerbation   Does the patient have one of the following disease processes/diagnoses(primary or secondary)? COPD   Does the patient have Home health ordered? No   Is there a DME ordered? No   Prep survey completed? Yes            Oumou JIANG - Registered Nurse

## 2024-03-13 LAB
BACTERIA SPEC AEROBE CULT: NORMAL
BACTERIA SPEC AEROBE CULT: NORMAL

## 2024-03-14 ENCOUNTER — READMISSION MANAGEMENT (OUTPATIENT)
Dept: CALL CENTER | Facility: HOSPITAL | Age: 79
End: 2024-03-14
Payer: MEDICARE

## 2024-03-14 NOTE — OUTREACH NOTE
"COPD/PN Week 1 Survey      Flowsheet Row Responses   [facility] patient discharged from? [place]   Does the patient have one of the following disease processes/diagnoses(primary or secondary)? COPD   Week 1 attempt successful? Yes   Call start time 1042   Call end time 1053   Is patient permission given to speak with other caregiver? Yes   Person spoke with today (if not patient) and relationship Nicki-spouse.   Meds reviewed with patient/caregiver? Yes   Is the patient having any side effects they believe may be caused by any medication additions or changes? No   Does the patient have all medications ordered at discharge? No   What is keeping the patient from filling the prescriptions? --  [Wife states [facility]ort [facility]   Nursing Interventions No intervention needed   Is the patient taking all medications as directed (includes completed medication regime)? Yes   Comments regarding appointments [facility] NP visited on 03/11/24.   Does the patient have a primary care provider?  Yes   Does the patient have an appointment with their PCP or specialist within 7 days of discharge? Yes   Has the patient kept scheduled appointments due by today? Yes   Has home health visited the patient within 72 hours of discharge? Yes   Home health comments Spouse states has  nurse, [facility].   DME comments Spouse states uses Bipap continuous.   Pulse Ox monitoring Intermittent   Pulse Ox device source Patient   O2 Sat comments Spouse states O2 sats stay \"good\".   O2 Sat: education provided Sat levels   Psychosocial issues? No   Did the patient receive a copy of their discharge instructions? Yes   Nursing interventions Reviewed instructions with patient   What is the patient's perception of their health status since discharge? Improving   Nursing Interventions Nurse provided patient education   Are the patient's immunizations up to date?  Yes   Nursing interventions Advised patient to discuss with " provider at next visit   Is the patient/caregiver able to teach back the hierarchy of who to call/visit for symptoms/problems? PCP, Specialist, Home health nurse, Urgent Care, ED, 911 Yes   Is the patient able to teach back COPD zones? No   Nursing interventions Education provided on various zones   Patient reports what zone on this call? Yellow Zone   Yellow Zone I have less energy for my daily activities   Yellow interventions Continue taking daily medications, Use quick relief inhaler, Use oxygen as prescribed, Get plenty of rest, Start an oral corticosteroid if ordered, Start antibiotic if ordered, Call provider immediately if symptoms don't improve: they may indicate that an adjustment in medication or oxygen therapy is needed, Use pursed lip breathing, Avoid second-hand smoke, e-cigarette aerosol, and other inhaled irritants   Week 1 call completed? Yes   Graduated Yes   Is the patient interested in additional calls from an ambulatory ? No   Would this patient benefit from a Referral to Amb Social Work? No   Wrap up additional comments Spouse states patient has improved, but in final stages of COPD. States [facility]ymbicort [facility]. Denies any needs or concerns today. Denies any need for further f/u calls.   Call end time [id number]            Sandra JAMES - Registered Nurse

## 2024-04-04 ENCOUNTER — APPOINTMENT (OUTPATIENT)
Dept: GENERAL RADIOLOGY | Facility: HOSPITAL | Age: 79
DRG: 190 | End: 2024-04-04
Payer: MEDICARE

## 2024-04-04 ENCOUNTER — HOSPITAL ENCOUNTER (INPATIENT)
Facility: HOSPITAL | Age: 79
LOS: 4 days | Discharge: HOME | DRG: 190 | End: 2024-04-08
Attending: EMERGENCY MEDICINE | Admitting: INTERNAL MEDICINE
Payer: MEDICARE

## 2024-04-04 DIAGNOSIS — J44.1 COPD EXACERBATION: Primary | ICD-10-CM

## 2024-04-04 DIAGNOSIS — J18.9 PNEUMONIA DUE TO INFECTIOUS ORGANISM, UNSPECIFIED LATERALITY, UNSPECIFIED PART OF LUNG: ICD-10-CM

## 2024-04-04 PROBLEM — J96.21 ACUTE ON CHRONIC RESPIRATORY FAILURE WITH HYPOXIA AND HYPERCAPNIA: Status: ACTIVE | Noted: 2024-04-04

## 2024-04-04 PROBLEM — J96.11 CHRONIC RESPIRATORY FAILURE WITH HYPOXIA AND HYPERCAPNIA: Status: ACTIVE | Noted: 2024-04-04

## 2024-04-04 PROBLEM — J96.12 CHRONIC RESPIRATORY FAILURE WITH HYPOXIA AND HYPERCAPNIA: Status: ACTIVE | Noted: 2024-04-04

## 2024-04-04 PROBLEM — J96.22 ACUTE ON CHRONIC RESPIRATORY FAILURE WITH HYPOXIA AND HYPERCAPNIA: Status: ACTIVE | Noted: 2024-04-04

## 2024-04-04 LAB
ALBUMIN SERPL-MCNC: 3.8 G/DL (ref 3.5–5.2)
ALBUMIN/GLOB SERPL: 1.3 G/DL
ALP SERPL-CCNC: 145 U/L (ref 39–117)
ALT SERPL W P-5'-P-CCNC: 40 U/L (ref 1–41)
ANION GAP SERPL CALCULATED.3IONS-SCNC: 9 MMOL/L (ref 5–15)
ANION GAP SERPL CALCULATED.3IONS-SCNC: 9.3 MMOL/L (ref 5–15)
APTT PPP: 33.4 SECONDS (ref 22.7–35.4)
ARTERIAL PATENCY WRIST A: POSITIVE
AST SERPL-CCNC: 23 U/L (ref 1–40)
ATMOSPHERIC PRESS: 737.3 MMHG
B PARAPERT DNA SPEC QL NAA+PROBE: NOT DETECTED
B PERT DNA SPEC QL NAA+PROBE: NOT DETECTED
BASE EXCESS BLDA CALC-SCNC: 0.5 MMOL/L (ref 0–2)
BASOPHILS # BLD AUTO: 0.06 10*3/MM3 (ref 0–0.2)
BASOPHILS NFR BLD AUTO: 0.6 % (ref 0–1.5)
BDY SITE: ABNORMAL
BILIRUB SERPL-MCNC: 0.5 MG/DL (ref 0–1.2)
BUN SERPL-MCNC: 16 MG/DL (ref 8–23)
BUN SERPL-MCNC: 18 MG/DL (ref 8–23)
BUN/CREAT SERPL: 16.8 (ref 7–25)
BUN/CREAT SERPL: 22.5 (ref 7–25)
C PNEUM DNA NPH QL NAA+NON-PROBE: NOT DETECTED
CALCIUM SPEC-SCNC: 8.4 MG/DL (ref 8.6–10.5)
CALCIUM SPEC-SCNC: 9 MG/DL (ref 8.6–10.5)
CHLORIDE SERPL-SCNC: 100 MMOL/L (ref 98–107)
CHLORIDE SERPL-SCNC: 97 MMOL/L (ref 98–107)
CO2 BLDA-SCNC: 27.7 MMOL/L (ref 23–27)
CO2 SERPL-SCNC: 24 MMOL/L (ref 22–29)
CO2 SERPL-SCNC: 26.7 MMOL/L (ref 22–29)
CREAT SERPL-MCNC: 0.8 MG/DL (ref 0.76–1.27)
CREAT SERPL-MCNC: 0.95 MG/DL (ref 0.76–1.27)
D-LACTATE SERPL-SCNC: 1.1 MMOL/L (ref 0.5–2)
DEPRECATED RDW RBC AUTO: 37.6 FL (ref 37–54)
DEPRECATED RDW RBC AUTO: 39.2 FL (ref 37–54)
EGFRCR SERPLBLD CKD-EPI 2021: 81.4 ML/MIN/1.73
EGFRCR SERPLBLD CKD-EPI 2021: 90 ML/MIN/1.73
EOSINOPHIL # BLD AUTO: 0.08 10*3/MM3 (ref 0–0.4)
EOSINOPHIL NFR BLD AUTO: 0.8 % (ref 0.3–6.2)
ERYTHROCYTE [DISTWIDTH] IN BLOOD BY AUTOMATED COUNT: 11.3 % (ref 12.3–15.4)
ERYTHROCYTE [DISTWIDTH] IN BLOOD BY AUTOMATED COUNT: 11.4 % (ref 12.3–15.4)
FLUAV SUBTYP SPEC NAA+PROBE: NOT DETECTED
FLUBV RNA ISLT QL NAA+PROBE: NOT DETECTED
GAS FLOW AIRWAY: 4 LPM
GEN 5 2HR TROPONIN T REFLEX: 38 NG/L
GLOBULIN UR ELPH-MCNC: 3 GM/DL
GLUCOSE BLDC GLUCOMTR-MCNC: 121 MG/DL (ref 70–130)
GLUCOSE BLDC GLUCOMTR-MCNC: 131 MG/DL (ref 70–130)
GLUCOSE BLDC GLUCOMTR-MCNC: 158 MG/DL (ref 70–130)
GLUCOSE BLDC GLUCOMTR-MCNC: 163 MG/DL (ref 70–130)
GLUCOSE SERPL-MCNC: 143 MG/DL (ref 65–99)
GLUCOSE SERPL-MCNC: 211 MG/DL (ref 65–99)
HADV DNA SPEC NAA+PROBE: NOT DETECTED
HBA1C MFR BLD: 5.9 % (ref 4.8–5.6)
HCO3 BLDA-SCNC: 26.3 MMOL/L (ref 22–28)
HCOV 229E RNA SPEC QL NAA+PROBE: NOT DETECTED
HCOV HKU1 RNA SPEC QL NAA+PROBE: NOT DETECTED
HCOV NL63 RNA SPEC QL NAA+PROBE: NOT DETECTED
HCOV OC43 RNA SPEC QL NAA+PROBE: NOT DETECTED
HCT VFR BLD AUTO: 38.4 % (ref 37.5–51)
HCT VFR BLD AUTO: 41.6 % (ref 37.5–51)
HEMODILUTION: NO
HGB BLD-MCNC: 12.5 G/DL (ref 13–17.7)
HGB BLD-MCNC: 13.3 G/DL (ref 13–17.7)
HMPV RNA NPH QL NAA+NON-PROBE: NOT DETECTED
HPIV1 RNA ISLT QL NAA+PROBE: NOT DETECTED
HPIV2 RNA SPEC QL NAA+PROBE: NOT DETECTED
HPIV3 RNA NPH QL NAA+PROBE: NOT DETECTED
HPIV4 P GENE NPH QL NAA+PROBE: NOT DETECTED
IMM GRANULOCYTES # BLD AUTO: 0.08 10*3/MM3 (ref 0–0.05)
IMM GRANULOCYTES NFR BLD AUTO: 0.8 % (ref 0–0.5)
INR PPP: 1.09 (ref 0.9–1.1)
L PNEUMO1 AG UR QL IA: NEGATIVE
LYMPHOCYTES # BLD AUTO: 1.3 10*3/MM3 (ref 0.7–3.1)
LYMPHOCYTES NFR BLD AUTO: 13.5 % (ref 19.6–45.3)
M PNEUMO IGG SER IA-ACNC: NOT DETECTED
MAGNESIUM SERPL-MCNC: 2 MG/DL (ref 1.6–2.4)
MCH RBC QN AUTO: 29.1 PG (ref 26.6–33)
MCH RBC QN AUTO: 30 PG (ref 26.6–33)
MCHC RBC AUTO-ENTMCNC: 32 G/DL (ref 31.5–35.7)
MCHC RBC AUTO-ENTMCNC: 32.6 G/DL (ref 31.5–35.7)
MCV RBC AUTO: 91 FL (ref 79–97)
MCV RBC AUTO: 92.3 FL (ref 79–97)
MODALITY: ABNORMAL
MONOCYTES # BLD AUTO: 0.72 10*3/MM3 (ref 0.1–0.9)
MONOCYTES NFR BLD AUTO: 7.5 % (ref 5–12)
NEUTROPHILS NFR BLD AUTO: 7.38 10*3/MM3 (ref 1.7–7)
NEUTROPHILS NFR BLD AUTO: 76.8 % (ref 42.7–76)
NRBC BLD AUTO-RTO: 0 /100 WBC (ref 0–0.2)
NT-PROBNP SERPL-MCNC: 1065 PG/ML (ref 0–1800)
PCO2 BLDA: 45.2 MM HG (ref 35–45)
PH BLDA: 7.37 PH UNITS (ref 7.35–7.45)
PLATELET # BLD AUTO: 283 10*3/MM3 (ref 140–450)
PLATELET # BLD AUTO: 332 10*3/MM3 (ref 140–450)
PMV BLD AUTO: 8.7 FL (ref 6–12)
PMV BLD AUTO: 9 FL (ref 6–12)
PO2 BLDA: 57.7 MM HG (ref 80–100)
POTASSIUM SERPL-SCNC: 4.5 MMOL/L (ref 3.5–5.2)
POTASSIUM SERPL-SCNC: 4.7 MMOL/L (ref 3.5–5.2)
PROCALCITONIN SERPL-MCNC: 0.05 NG/ML (ref 0–0.25)
PROT SERPL-MCNC: 6.8 G/DL (ref 6–8.5)
PROTHROMBIN TIME: 14.4 SECONDS (ref 11.7–14.2)
QT INTERVAL: 387 MS
QTC INTERVAL: 471 MS
RBC # BLD AUTO: 4.16 10*6/MM3 (ref 4.14–5.8)
RBC # BLD AUTO: 4.57 10*6/MM3 (ref 4.14–5.8)
RHINOVIRUS RNA SPEC NAA+PROBE: NOT DETECTED
RSV RNA NPH QL NAA+NON-PROBE: NOT DETECTED
S PNEUM AG SPEC QL LA: NEGATIVE
SAO2 % BLDCOA: 88.6 % (ref 92–98.5)
SARS-COV-2 RNA NPH QL NAA+NON-PROBE: NOT DETECTED
SODIUM SERPL-SCNC: 133 MMOL/L (ref 136–145)
SODIUM SERPL-SCNC: 133 MMOL/L (ref 136–145)
TOTAL RATE: 44 BREATHS/MINUTE
TROPONIN T DELTA: 5 NG/L
TROPONIN T SERPL HS-MCNC: 33 NG/L
WBC NRBC COR # BLD AUTO: 5.97 10*3/MM3 (ref 3.4–10.8)
WBC NRBC COR # BLD AUTO: 9.62 10*3/MM3 (ref 3.4–10.8)

## 2024-04-04 PROCEDURE — 83880 ASSAY OF NATRIURETIC PEPTIDE: CPT | Performed by: EMERGENCY MEDICINE

## 2024-04-04 PROCEDURE — 0202U NFCT DS 22 TRGT SARS-COV-2: CPT | Performed by: EMERGENCY MEDICINE

## 2024-04-04 PROCEDURE — 94799 UNLISTED PULMONARY SVC/PX: CPT

## 2024-04-04 PROCEDURE — 87449 NOS EACH ORGANISM AG IA: CPT | Performed by: NURSE PRACTITIONER

## 2024-04-04 PROCEDURE — 84145 PROCALCITONIN (PCT): CPT | Performed by: EMERGENCY MEDICINE

## 2024-04-04 PROCEDURE — 87040 BLOOD CULTURE FOR BACTERIA: CPT | Performed by: EMERGENCY MEDICINE

## 2024-04-04 PROCEDURE — 94761 N-INVAS EAR/PLS OXIMETRY MLT: CPT

## 2024-04-04 PROCEDURE — 36415 COLL VENOUS BLD VENIPUNCTURE: CPT | Performed by: NURSE PRACTITIONER

## 2024-04-04 PROCEDURE — 94664 DEMO&/EVAL PT USE INHALER: CPT

## 2024-04-04 PROCEDURE — 25010000002 CEFEPIME PER 500 MG: Performed by: EMERGENCY MEDICINE

## 2024-04-04 PROCEDURE — 94660 CPAP INITIATION&MGMT: CPT

## 2024-04-04 PROCEDURE — 63710000001 INSULIN LISPRO (HUMAN) PER 5 UNITS: Performed by: NURSE PRACTITIONER

## 2024-04-04 PROCEDURE — 83605 ASSAY OF LACTIC ACID: CPT | Performed by: EMERGENCY MEDICINE

## 2024-04-04 PROCEDURE — 85027 COMPLETE CBC AUTOMATED: CPT | Performed by: NURSE PRACTITIONER

## 2024-04-04 PROCEDURE — 63710000001 INSULIN LISPRO (HUMAN) PER 5 UNITS: Performed by: INTERNAL MEDICINE

## 2024-04-04 PROCEDURE — 85025 COMPLETE CBC W/AUTO DIFF WBC: CPT | Performed by: EMERGENCY MEDICINE

## 2024-04-04 PROCEDURE — 85730 THROMBOPLASTIN TIME PARTIAL: CPT | Performed by: EMERGENCY MEDICINE

## 2024-04-04 PROCEDURE — 25010000002 CEFEPIME PER 500 MG: Performed by: NURSE PRACTITIONER

## 2024-04-04 PROCEDURE — 71045 X-RAY EXAM CHEST 1 VIEW: CPT

## 2024-04-04 PROCEDURE — 94760 N-INVAS EAR/PLS OXIMETRY 1: CPT

## 2024-04-04 PROCEDURE — 93005 ELECTROCARDIOGRAM TRACING: CPT | Performed by: EMERGENCY MEDICINE

## 2024-04-04 PROCEDURE — 36600 WITHDRAWAL OF ARTERIAL BLOOD: CPT | Performed by: EMERGENCY MEDICINE

## 2024-04-04 PROCEDURE — 94640 AIRWAY INHALATION TREATMENT: CPT

## 2024-04-04 PROCEDURE — 84484 ASSAY OF TROPONIN QUANT: CPT | Performed by: EMERGENCY MEDICINE

## 2024-04-04 PROCEDURE — 82948 REAGENT STRIP/BLOOD GLUCOSE: CPT

## 2024-04-04 PROCEDURE — 25010000002 CEFEPIME PER 500 MG: Performed by: INTERNAL MEDICINE

## 2024-04-04 PROCEDURE — 83036 HEMOGLOBIN GLYCOSYLATED A1C: CPT | Performed by: INTERNAL MEDICINE

## 2024-04-04 PROCEDURE — 25010000002 METHYLPREDNISOLONE PER 125 MG: Performed by: EMERGENCY MEDICINE

## 2024-04-04 PROCEDURE — 82803 BLOOD GASES ANY COMBINATION: CPT | Performed by: EMERGENCY MEDICINE

## 2024-04-04 PROCEDURE — 85610 PROTHROMBIN TIME: CPT | Performed by: EMERGENCY MEDICINE

## 2024-04-04 PROCEDURE — 83735 ASSAY OF MAGNESIUM: CPT | Performed by: EMERGENCY MEDICINE

## 2024-04-04 PROCEDURE — 99291 CRITICAL CARE FIRST HOUR: CPT

## 2024-04-04 PROCEDURE — 80053 COMPREHEN METABOLIC PANEL: CPT | Performed by: EMERGENCY MEDICINE

## 2024-04-04 PROCEDURE — 93010 ELECTROCARDIOGRAM REPORT: CPT | Performed by: INTERNAL MEDICINE

## 2024-04-04 RX ORDER — METHYLPREDNISOLONE SODIUM SUCCINATE 40 MG/ML
40 INJECTION, POWDER, LYOPHILIZED, FOR SOLUTION INTRAMUSCULAR; INTRAVENOUS DAILY
Status: DISCONTINUED | OUTPATIENT
Start: 2024-04-05 | End: 2024-04-05

## 2024-04-04 RX ORDER — POLYETHYLENE GLYCOL 3350 17 G/17G
17 POWDER, FOR SOLUTION ORAL DAILY PRN
Status: DISCONTINUED | OUTPATIENT
Start: 2024-04-04 | End: 2024-04-08 | Stop reason: HOSPADM

## 2024-04-04 RX ORDER — GUAIFENESIN 600 MG/1
1200 TABLET, EXTENDED RELEASE ORAL 2 TIMES DAILY
Status: DISCONTINUED | OUTPATIENT
Start: 2024-04-04 | End: 2024-04-08 | Stop reason: HOSPADM

## 2024-04-04 RX ORDER — METHYLPREDNISOLONE SODIUM SUCCINATE 125 MG/2ML
125 INJECTION, POWDER, LYOPHILIZED, FOR SOLUTION INTRAMUSCULAR; INTRAVENOUS ONCE
Status: COMPLETED | OUTPATIENT
Start: 2024-04-04 | End: 2024-04-04

## 2024-04-04 RX ORDER — ALBUTEROL SULFATE 2.5 MG/3ML
2.5 SOLUTION RESPIRATORY (INHALATION) ONCE AS NEEDED
Status: DISCONTINUED | OUTPATIENT
Start: 2024-04-04 | End: 2024-04-04

## 2024-04-04 RX ORDER — FLUTICASONE PROPIONATE 50 MCG
2 SPRAY, SUSPENSION (ML) NASAL DAILY
Status: DISCONTINUED | OUTPATIENT
Start: 2024-04-04 | End: 2024-04-08 | Stop reason: HOSPADM

## 2024-04-04 RX ORDER — POLYETHYLENE GLYCOL 3350 17 G/17G
17 POWDER, FOR SOLUTION ORAL DAILY
Status: DISCONTINUED | OUTPATIENT
Start: 2024-04-04 | End: 2024-04-08 | Stop reason: HOSPADM

## 2024-04-04 RX ORDER — SODIUM CHLORIDE 0.9 % (FLUSH) 0.9 %
10 SYRINGE (ML) INJECTION EVERY 12 HOURS SCHEDULED
Status: DISCONTINUED | OUTPATIENT
Start: 2024-04-04 | End: 2024-04-08 | Stop reason: HOSPADM

## 2024-04-04 RX ORDER — IPRATROPIUM BROMIDE AND ALBUTEROL SULFATE 2.5; .5 MG/3ML; MG/3ML
3 SOLUTION RESPIRATORY (INHALATION)
Status: DISCONTINUED | OUTPATIENT
Start: 2024-04-04 | End: 2024-04-08 | Stop reason: HOSPADM

## 2024-04-04 RX ORDER — ACETAMINOPHEN 650 MG/1
650 SUPPOSITORY RECTAL EVERY 4 HOURS PRN
Status: DISCONTINUED | OUTPATIENT
Start: 2024-04-04 | End: 2024-04-08 | Stop reason: HOSPADM

## 2024-04-04 RX ORDER — NITROGLYCERIN 0.4 MG/1
0.4 TABLET SUBLINGUAL
Status: DISCONTINUED | OUTPATIENT
Start: 2024-04-04 | End: 2024-04-08 | Stop reason: HOSPADM

## 2024-04-04 RX ORDER — AMOXICILLIN 250 MG
2 CAPSULE ORAL 2 TIMES DAILY PRN
Status: DISCONTINUED | OUTPATIENT
Start: 2024-04-04 | End: 2024-04-08 | Stop reason: HOSPADM

## 2024-04-04 RX ORDER — IPRATROPIUM BROMIDE AND ALBUTEROL SULFATE 2.5; .5 MG/3ML; MG/3ML
3 SOLUTION RESPIRATORY (INHALATION) ONCE
Status: COMPLETED | OUTPATIENT
Start: 2024-04-04 | End: 2024-04-04

## 2024-04-04 RX ORDER — NICOTINE POLACRILEX 4 MG
15 LOZENGE BUCCAL
Status: DISCONTINUED | OUTPATIENT
Start: 2024-04-04 | End: 2024-04-08 | Stop reason: HOSPADM

## 2024-04-04 RX ORDER — ASPIRIN 81 MG/1
81 TABLET ORAL DAILY
Status: DISCONTINUED | OUTPATIENT
Start: 2024-04-04 | End: 2024-04-08 | Stop reason: HOSPADM

## 2024-04-04 RX ORDER — BUDESONIDE AND FORMOTEROL FUMARATE DIHYDRATE 160; 4.5 UG/1; UG/1
2 AEROSOL RESPIRATORY (INHALATION)
Status: DISCONTINUED | OUTPATIENT
Start: 2024-04-04 | End: 2024-04-08 | Stop reason: HOSPADM

## 2024-04-04 RX ORDER — DEXTROSE MONOHYDRATE 25 G/50ML
25 INJECTION, SOLUTION INTRAVENOUS
Status: DISCONTINUED | OUTPATIENT
Start: 2024-04-04 | End: 2024-04-08 | Stop reason: HOSPADM

## 2024-04-04 RX ORDER — SODIUM CHLORIDE 9 MG/ML
40 INJECTION, SOLUTION INTRAVENOUS AS NEEDED
Status: DISCONTINUED | OUTPATIENT
Start: 2024-04-04 | End: 2024-04-08 | Stop reason: HOSPADM

## 2024-04-04 RX ORDER — ACETAMINOPHEN 325 MG/1
650 TABLET ORAL EVERY 4 HOURS PRN
Status: DISCONTINUED | OUTPATIENT
Start: 2024-04-04 | End: 2024-04-08 | Stop reason: HOSPADM

## 2024-04-04 RX ORDER — FAMOTIDINE 20 MG/1
20 TABLET, FILM COATED ORAL 2 TIMES DAILY
Status: DISCONTINUED | OUTPATIENT
Start: 2024-04-04 | End: 2024-04-08 | Stop reason: HOSPADM

## 2024-04-04 RX ORDER — IBUPROFEN 600 MG/1
1 TABLET ORAL
Status: DISCONTINUED | OUTPATIENT
Start: 2024-04-04 | End: 2024-04-08 | Stop reason: HOSPADM

## 2024-04-04 RX ORDER — INSULIN LISPRO 100 [IU]/ML
2-7 INJECTION, SOLUTION INTRAVENOUS; SUBCUTANEOUS
Status: DISCONTINUED | OUTPATIENT
Start: 2024-04-04 | End: 2024-04-08 | Stop reason: HOSPADM

## 2024-04-04 RX ORDER — IPRATROPIUM BROMIDE AND ALBUTEROL SULFATE 2.5; .5 MG/3ML; MG/3ML
3 SOLUTION RESPIRATORY (INHALATION) EVERY 4 HOURS PRN
Status: DISCONTINUED | OUTPATIENT
Start: 2024-04-04 | End: 2024-04-08 | Stop reason: HOSPADM

## 2024-04-04 RX ORDER — ALBUTEROL SULFATE 2.5 MG/3ML
2.5 SOLUTION RESPIRATORY (INHALATION) ONCE AS NEEDED
Status: DISCONTINUED | OUTPATIENT
Start: 2024-04-04 | End: 2024-04-08 | Stop reason: HOSPADM

## 2024-04-04 RX ORDER — SODIUM CHLORIDE 0.9 % (FLUSH) 0.9 %
10 SYRINGE (ML) INJECTION AS NEEDED
Status: DISCONTINUED | OUTPATIENT
Start: 2024-04-04 | End: 2024-04-08 | Stop reason: HOSPADM

## 2024-04-04 RX ORDER — FOLIC ACID 1 MG/1
1 TABLET ORAL DAILY
Status: DISCONTINUED | OUTPATIENT
Start: 2024-04-04 | End: 2024-04-08 | Stop reason: HOSPADM

## 2024-04-04 RX ORDER — BISACODYL 5 MG/1
5 TABLET, DELAYED RELEASE ORAL DAILY PRN
Status: DISCONTINUED | OUTPATIENT
Start: 2024-04-04 | End: 2024-04-08 | Stop reason: HOSPADM

## 2024-04-04 RX ORDER — BISACODYL 10 MG
10 SUPPOSITORY, RECTAL RECTAL DAILY PRN
Status: DISCONTINUED | OUTPATIENT
Start: 2024-04-04 | End: 2024-04-08 | Stop reason: HOSPADM

## 2024-04-04 RX ORDER — ATORVASTATIN CALCIUM 80 MG/1
80 TABLET, FILM COATED ORAL DAILY
Status: DISCONTINUED | OUTPATIENT
Start: 2024-04-04 | End: 2024-04-08 | Stop reason: HOSPADM

## 2024-04-04 RX ORDER — ACETAMINOPHEN 160 MG/5ML
650 SOLUTION ORAL EVERY 4 HOURS PRN
Status: DISCONTINUED | OUTPATIENT
Start: 2024-04-04 | End: 2024-04-08 | Stop reason: HOSPADM

## 2024-04-04 RX ADMIN — ATORVASTATIN CALCIUM 80 MG: 80 TABLET, FILM COATED ORAL at 16:47

## 2024-04-04 RX ADMIN — INSULIN LISPRO 2 UNITS: 100 INJECTION, SOLUTION INTRAVENOUS; SUBCUTANEOUS at 17:40

## 2024-04-04 RX ADMIN — Medication 10 ML: at 08:38

## 2024-04-04 RX ADMIN — Medication 10 ML: at 20:05

## 2024-04-04 RX ADMIN — FOLIC ACID 1 MG: 1 TABLET ORAL at 16:47

## 2024-04-04 RX ADMIN — BUDESONIDE AND FORMOTEROL FUMARATE DIHYDRATE 2 PUFF: 160; 4.5 AEROSOL RESPIRATORY (INHALATION) at 20:43

## 2024-04-04 RX ADMIN — INSULIN LISPRO 2 UNITS: 100 INJECTION, SOLUTION INTRAVENOUS; SUBCUTANEOUS at 12:15

## 2024-04-04 RX ADMIN — GUAIFENESIN 1200 MG: 600 TABLET, EXTENDED RELEASE ORAL at 20:03

## 2024-04-04 RX ADMIN — FAMOTIDINE 20 MG: 20 TABLET, FILM COATED ORAL at 20:03

## 2024-04-04 RX ADMIN — IPRATROPIUM BROMIDE AND ALBUTEROL SULFATE 3 ML: .5; 3 SOLUTION RESPIRATORY (INHALATION) at 15:58

## 2024-04-04 RX ADMIN — ASPIRIN 81 MG: 81 TABLET, COATED ORAL at 16:47

## 2024-04-04 RX ADMIN — POLYETHYLENE GLYCOL 3350 17 G: 17 POWDER, FOR SOLUTION ORAL at 16:47

## 2024-04-04 RX ADMIN — CEFEPIME 2000 MG: 2 INJECTION, POWDER, FOR SOLUTION INTRAVENOUS at 20:05

## 2024-04-04 RX ADMIN — FLUTICASONE PROPIONATE 2 SPRAY: 50 SPRAY, METERED NASAL at 16:47

## 2024-04-04 RX ADMIN — CEFEPIME 2000 MG: 2 INJECTION, POWDER, FOR SOLUTION INTRAVENOUS at 03:35

## 2024-04-04 RX ADMIN — IPRATROPIUM BROMIDE AND ALBUTEROL SULFATE 3 ML: .5; 3 SOLUTION RESPIRATORY (INHALATION) at 20:35

## 2024-04-04 RX ADMIN — SACUBITRIL AND VALSARTAN 1 TABLET: 24; 26 TABLET, FILM COATED ORAL at 20:03

## 2024-04-04 RX ADMIN — METHYLPREDNISOLONE SODIUM SUCCINATE 125 MG: 125 INJECTION, POWDER, FOR SOLUTION INTRAMUSCULAR; INTRAVENOUS at 02:27

## 2024-04-04 RX ADMIN — CEFEPIME 2000 MG: 2 INJECTION, POWDER, FOR SOLUTION INTRAVENOUS at 12:15

## 2024-04-04 RX ADMIN — IPRATROPIUM BROMIDE AND ALBUTEROL SULFATE 3 ML: .5; 3 SOLUTION RESPIRATORY (INHALATION) at 02:18

## 2024-04-04 RX ADMIN — APIXABAN 5 MG: 5 TABLET, FILM COATED ORAL at 20:03

## 2024-04-04 NOTE — H&P
Patient Name:  Wisam Malone  YOB: 1945  MRN:  [id number]  Admit Date:  4/4/2024  Patient Care Team:  System, Provider Not In as PCP - General      Subjective   History Present Illness     Chief Complaint   Patient presents with    Shortness of Breath     History of Present Illness  Mr. Malone is a 78 y.o. former smoker with a history of CAD, COPD, hypertension, ischemic cardiomyopathy and PAF that presents to [facility] complaining of dyspnea. The patient states that he has been in his usual state of health until yesterday when he developed an acute onset of trouble breathing..  He states that this was unassociated with any chest pain, fever or chills.  He states he did have some increased cough a couple days ago, but this has subsided.  He denies any nausea, vomiting or abdominal pain and denies any changes in his urination or bowel.  He does have BiPAP at home in which she is compliant with at nighttime.  He tells me that he does not use oxygen during the day at home, but per ED notes he is on 2 to 4 L at baseline.  He denies any upper respiratory complaints or recent sick contacts.  He does not smoke.  He was last admitted to this facility 3/8 to 3/10/2024 when he presented with cough and dyspnea at that time.  He was treated for COPD exacerbation and discharged on oral steroids with a taper planned until he reached his home dose that he takes daily.  He is not routinely followed by a pulmonologist and [facility]  He does have a history of ischemic cardiomyopathy and has seen [place] cardiology here in the past.  He has atrial fibrillation history and is on Eliquis.  His most recent echocardiogram was obtained here when he was admitted for septic shock in November 2023.  This revealed an EF of 36 to 40% which was improved from his prior in 2021.    In the ED, he was afebrile and hemodynamically stable with the exception of increased work of  breathing and respiratory rate.  He was on CPAP which was briefly removed but replaced due to ongoing work of breathing.  Lab work revealed a WBC of 9.62, hemoglobin 13.3, Pro-Suman 0.05, high-sensitivity troponin 33 and BNP 1065.  Creatinine was 0.95 with a sodium of 133.  ABGs revealed a pH of 7.37 with a CO2 of 45 and pO2 of 57 on 4 L nasal cannula.  Repeat troponin was down to 38.  RVP was negative and lactate 1.1.  Pulmonology was consulted and evaluated him in the emergency room.  He was placed on IV steroids and antibiotics.    Review of Systems   Constitutional:  Negative for appetite change, chills, fatigue and fever.   HENT:  Negative for congestion and ear pain.    Respiratory:  Positive for cough and shortness of breath.    Cardiovascular:  Positive for leg swelling. Negative for chest pain.   Gastrointestinal:  Negative for abdominal pain, diarrhea, nausea and vomiting.   Genitourinary:  Negative for difficulty urinating and dysuria.   Musculoskeletal:  Negative for arthralgias and back pain.   Skin:  Negative for color change and pallor.   Neurological:  Negative for dizziness and light-headedness.   Psychiatric/Behavioral:  Negative for confusion. The patient is not nervous/anxious.       Personal History     Past Medical History:   Diagnosis Date    COPD (chronic obstructive pulmonary disease)     Elevated cholesterol     GERD (gastroesophageal reflux disease)     Hypertension      Past Surgical History:   Procedure Laterality Date    CORONARY ARTERY BYPASS GRAFT  1996     History reviewed. No pertinent family history.  Social History     Tobacco Use    Smoking status: Former     Average packs/day: 1 pack/day for 62.0 years (62.0 ttl pk-yrs)     Types: Cigarettes     Start date: 1960    Smokeless tobacco: Never   Vaping Use    Vaping status: Never Used   Substance Use Topics    Alcohol use: Never    Drug use: Never     Medications Prior to Admission   Medication Sig Dispense Refill Last Dose     albuterol sulfate  (90 Base) MCG/ACT inhaler Inhale 2 puffs Every 4 (Four) Hours As Needed for Wheezing.       apixaban (ELIQUIS) 5 MG tablet tablet Take 1 tablet by mouth Every 12 (Twelve) Hours.       aspirin 81 MG EC tablet Take 1 tablet by mouth Daily.       atorvastatin (LIPITOR) 80 MG tablet Take 1 tablet by mouth Daily.       budesonide-formoterol (SYMBICORT) 160-4.5 MCG/ACT inhaler Inhale 2 puffs 2 (Two) Times a Day. 10.2 g 0     dextromethorphan-guaifenesin (ROBITUSSIN-DM)  MG/5ML syrup Take 5 mL by mouth Every 6 (Six) Hours As Needed.       famotidine (PEPCID) 20 MG tablet Take 1 tablet by mouth 2 (Two) Times a Day.       fluticasone (FLONASE) 50 MCG/ACT nasal spray 2 sprays into the nostril(s) as directed by provider Daily.       folic acid (FOLVITE) 1 MG tablet Take 1 tablet by mouth Daily.       guaiFENesin (MUCINEX) 600 MG 12 hr tablet Take 2 tablets by mouth 2 (Two) Times a Day.       ipratropium-albuterol (COMBIVENT RESPIMAT)  MCG/ACT inhaler Inhale 1 puff 4 (Four) Times a Day As Needed for Wheezing.       metoprolol tartrate (LOPRESSOR) 50 MG tablet Take 1.5 tablets by mouth 2 (Two) Times a Day.       polyethylene glycol (MIRALAX) 17 g packet Take 17 g by mouth Daily.       predniSONE (DELTASONE) 5 MG tablet Take 1 tablet by mouth Daily.       sacubitril-valsartan (ENTRESTO) 24-26 MG tablet Take 1 tablet by mouth 2 (Two) Times a Day.       tiotropium bromide monohydrate (SPIRIVA RESPIMAT) 2.5 MCG/ACT aerosol solution inhaler Inhale 2 puffs Daily.       vitamin B-12 (CYANOCOBALAMIN) 500 MCG tablet Take 2 tablets by mouth Daily.       vitamin D (ERGOCALCIFEROL) 1.25 MG (92522 UT) capsule capsule Take 1 capsule by mouth 1 (One) Time Per Week.        Allergies:  No Known Allergies    Objective    Objective     Vital Signs  Temp:  [96.6 °F (35.9 °C)-98.2 °F (36.8 °C)] 97.7 °F (36.5 °C)  Heart Rate:  [82-88] 88  Resp:  [20-44] 20  BP: ()/(61-79) 92/61  SpO2:  [95 %-100 %] 99 %  on   Flow (L/min):  [2-4] 2;   Device (Oxygen Therapy): NPPV/NIV  Body mass index is 20.54 kg/m².    Physical Exam  Vitals and nursing note reviewed.   Constitutional:       Appearance: He is ill-appearing. He is not toxic-appearing.   HENT:      Head: Normocephalic and atraumatic.      Right Ear: External ear normal.      Left Ear: External ear normal.      Nose: Nose normal.   Cardiovascular:      Rate and Rhythm: Normal rate and regular rhythm.      Pulses: Normal pulses.   Pulmonary:      Effort: Pulmonary effort is normal.      Breath sounds: Wheezing and rhonchi present.      Comments: On BIPAP, loose but nonproductive cough on exam.  Abdominal:      General: Bowel sounds are normal. There is no distension.      Palpations: Abdomen is soft.      Tenderness: There is no abdominal tenderness.   Musculoskeletal:         General: Swelling (trace BLE) present. Normal range of motion.      Cervical back: Normal range of motion and neck supple.   Skin:     General: Skin is warm and dry.      Findings: No bruising.   Neurological:      General: No focal deficit present.      Mental Status: He is alert and oriented to person, place, and time.      Sensory: No sensory deficit.      Motor: Weakness present.      Coordination: Coordination normal.   Psychiatric:         Mood and Affect: Mood normal.         Behavior: Behavior normal.     Results Review:  I reviewed the patient's new clinical results.  I reviewed the patient's new imaging results and agree with the interpretation.  I reviewed the patient's other test results and agree with the interpretation  I personally viewed and interpreted the patient's EKG/Telemetry data    Lab Results (last 24 hours)       Procedure Component Value Units Date/Time    CBC & Differential [[id number]]  (Abnormal) Collected: 04/04/24 0213    Specimen: Blood Updated: 04/04/24 0222    Narrative:      The following orders were created for panel order CBC & Differential.  Procedure                                Abnormality         Status                     ---------                               -----------         ------                     CBC Auto Differential[[id number]]        Abnormal            Final result                 Please view results for these tests on the individual orders.    Comprehensive Metabolic Panel [[id number]]  (Abnormal) Collected: 04/04/24 0213    Specimen: Blood Updated: 04/04/24 0244     Glucose 143 mg/dL      BUN 16 mg/dL      Creatinine 0.95 mg/dL      Sodium 133 mmol/L      Potassium 4.5 mmol/L      Chloride 97 mmol/L      CO2 26.7 mmol/L      Calcium 9.0 mg/dL      Total Protein 6.8 g/dL      Albumin 3.8 g/dL      ALT (SGPT) 40 U/L      AST (SGOT) 23 U/L      Alkaline Phosphatase 145 U/L      Total Bilirubin 0.5 mg/dL      Globulin 3.0 gm/dL      A/G Ratio 1.3 g/dL      BUN/Creatinine Ratio 16.8     Anion Gap 9.3 mmol/L      eGFR 81.4 mL/min/1.73     Narrative:      GFR Normal >60  Chronic Kidney Disease <60  Kidney Failure <15    The GFR formula is only valid for adults with stable renal function between ages 18 and 70.    Protime-INR [[id number]]  (Abnormal) Collected: 04/04/24 0213    Specimen: Blood Updated: 04/04/24 0232     Protime 14.4 Seconds      INR 1.09    aPTT [[id number]]  (Normal) Collected: 04/04/24 0213    Specimen: Blood Updated: 04/04/24 0232     PTT 33.4 seconds     BNP [[id number]]  (Normal) Collected: 04/04/24 0213    Specimen: Blood Updated: 04/04/24 0251     proBNP 1,065.0 pg/mL     Narrative:      This assay is used as an aid in the diagnosis of individuals suspected of having heart failure. It can be used as an aid in the diagnosis of acute decompensated heart failure (ADHF) in patients presenting with signs and symptoms of ADHF to the emergency department (ED). In addition, NT-proBNP of <300 pg/mL indicates ADHF is not likely.    Age Range Result Interpretation  NT-proBNP Concentration (pg/mL:      <50             Positive            >450                "    Green                 300-450                    Negative             <300    50-75           Positive            >900                  Gray                300-900                  Negative            <300      >75             Positive            >1800                  Gray                300-1800                  Negative            <300    High Sensitivity Troponin T [[id number]]  (Abnormal) Collected: 04/04/24 0213    Specimen: Blood Updated: 04/04/24 0251     HS Troponin T 33 ng/L     Narrative:      High Sensitive Troponin T Reference Range:  <14.0 ng/L- Negative Female for AMI  <22.0 ng/L- Negative Male for AMI  >=14 - Abnormal Female indicating possible myocardial injury.  >=22 - Abnormal Male indicating possible myocardial injury.   Clinicians would have to utilize clinical acumen, EKG, Troponin, and serial changes to determine if it is an Acute Myocardial Infarction or myocardial injury due to an underlying chronic condition.         Magnesium [[id number]]  (Normal) Collected: 04/04/24 0213    Specimen: Blood Updated: 04/04/24 0244     Magnesium 2.0 mg/dL     Procalcitonin [[id number]]  (Normal) Collected: 04/04/24 0213    Specimen: Blood Updated: 04/04/24 0251     Procalcitonin 0.05 ng/mL     Narrative:      As a Marker for Sepsis (Non-Neonates):    1. <0.5 ng/mL represents a low risk of severe sepsis and/or septic shock.  2. >2 ng/mL represents a high risk of severe sepsis and/or septic shock.    As a Marker for Lower Respiratory Tract Infections that require antibiotic therapy:    PCT on Admission    Antibiotic Therapy       6-12 Hrs later    >0.5                Strongly Recommended  >0.25 - <0.5        Recommended   0.1 - 0.25          Discouraged              Remeasure/reassess PCT  <0.1                Strongly Discouraged     Remeasure/reassess PCT    As 28 day mortality risk marker: \"Change in Procalcitonin Result\" (>80% or <=80%) if Day 0 (or Day 1) and Day 4 values are available. Refer to " http://[email or web address]-pct-calculator.com    Change in PCT <=80%  A decrease of PCT levels below or equal to 80% defines a positive change in PCT test result representing a higher risk for 28-day all-cause mortality of patients diagnosed with severe sepsis for septic shock.    Change in PCT >80%  A decrease of PCT levels of more than 80% defines a negative change in PCT result representing a lower risk for 28-day all-cause mortality of patients diagnosed with severe sepsis or septic shock.       CBC Auto Differential [[id number]]  (Abnormal) Collected: 04/04/24 0213    Specimen: Blood Updated: 04/04/24 0222     WBC 9.62 10*3/mm3      RBC 4.57 10*6/mm3      Hemoglobin 13.3 g/dL      Hematocrit 41.6 %      MCV 91.0 fL      MCH 29.1 pg      MCHC 32.0 g/dL      RDW 11.3 %      RDW-SD 37.6 fl      MPV 8.7 fL      Platelets 332 10*3/mm3      Neutrophil % 76.8 %      Lymphocyte % 13.5 %      Monocyte % 7.5 %      Eosinophil % 0.8 %      Basophil % 0.6 %      Immature Grans % 0.8 %      Neutrophils, Absolute 7.38 10*3/mm3      Lymphocytes, Absolute 1.30 10*3/mm3      Monocytes, Absolute 0.72 10*3/mm3      Eosinophils, Absolute 0.08 10*3/mm3      Basophils, Absolute 0.06 10*3/mm3      Immature Grans, Absolute 0.08 10*3/mm3      nRBC 0.0 /100 WBC     Respiratory Panel PCR w/COVID-19(SARS-CoV-2) KUSHAL/JAMI/MENA/PAD/COR/GARY In-House, NP Swab in UTM/VTM, 2 HR TAT - Swab, Nasopharynx [[id number]]  (Normal) Collected: 04/04/24 0214    Specimen: Swab from Nasopharynx Updated: 04/04/24 0310     ADENOVIRUS, PCR Not Detected     Coronavirus 229E Not Detected     Coronavirus HKU1 Not Detected     Coronavirus NL63 Not Detected     Coronavirus OC43 Not Detected     COVID19 Not Detected     Human Metapneumovirus Not Detected     Human Rhinovirus/Enterovirus Not Detected     Influenza A PCR Not Detected     Influenza B PCR Not Detected     Parainfluenza Virus 1 Not Detected     Parainfluenza Virus 2 Not Detected     Parainfluenza Virus 3 Not Detected      Parainfluenza Virus 4 Not Detected     RSV, PCR Not Detected     Bordetella pertussis pcr Not Detected     Bordetella parapertussis PCR Not Detected     Chlamydophila pneumoniae PCR Not Detected     Mycoplasma pneumo by PCR Not Detected    Narrative:      In the setting of a positive respiratory panel with a viral infection PLUS a negative procalcitonin without other underlying concern for bacterial infection, consider observing off antibiotics or discontinuation of antibiotics and continue supportive care. If the respiratory panel is positive for atypical bacterial infection (Bordetella pertussis, Chlamydophila pneumoniae, or Mycoplasma pneumoniae), consider antibiotic de-escalation to target atypical bacterial infection.    Blood Gas, Arterial - [[id number]]  (Abnormal) Collected: 04/04/24 0214    Specimen: Arterial Blood Updated: 04/04/24 0219     Site Left Brachial     Paul's Test Positive     pH, Arterial 7.372 pH units      pCO2, Arterial 45.2 mm Hg      pO2, Arterial 57.7 mm Hg      HCO3, Arterial 26.3 mmol/L      Base Excess, Arterial 0.5 mmol/L      Comment: Serial Number: [id number]:  [id number]        O2 Saturation, Arterial 88.6 %      CO2 Content 27.7 mmol/L      Barometric Pressure for Blood Gas 737.3000 mmHg      Modality Cannula     Flow Rate 4.0000 lpm      Rate 44 Breaths/minute      Hemodilution No    Lactic Acid, Plasma [[id number]]  (Normal) Collected: 04/04/24 0328    Specimen: Blood from Arm, Right Updated: 04/04/24 0356     Lactate 1.1 mmol/L     Blood Culture - Blood, Arm, Left [[id number]] Collected: 04/04/24 0328    Specimen: Blood from Arm, Left Updated: 04/04/24 0335    Blood Culture - Blood, Arm, Right [[id number]] Collected: 04/04/24 0328    Specimen: Blood from Arm, Right Updated: 04/04/24 0334    High Sensitivity Troponin T 2Hr [[id number]]  (Abnormal) Collected: 04/04/24 0328    Specimen: Blood from Arm, Right Updated: 04/04/24 0404     HS Troponin T 38 ng/L      Troponin T Delta 5  ng/L     Narrative:      High Sensitive Troponin T Reference Range:  <14.0 ng/L- Negative Female for AMI  <22.0 ng/L- Negative Male for AMI  >=14 - Abnormal Female indicating possible myocardial injury.  >=22 - Abnormal Male indicating possible myocardial injury.   Clinicians would have to utilize clinical acumen, EKG, Troponin, and serial changes to determine if it is an Acute Myocardial Infarction or myocardial injury due to an underlying chronic condition.         Legionella Antigen, Urine - Urine, Urine, Clean Catch [[id number]]  (Normal) Collected: 04/04/24 0515    Specimen: Urine, Clean Catch Updated: 04/04/24 0652     LEGIONELLA ANTIGEN, URINE Negative    S. Pneumo Ag Urine or CSF - Urine, Urine, Clean Catch [[id number]]  (Normal) Collected: 04/04/24 0515    Specimen: Urine, Clean Catch Updated: 04/04/24 0652     Strep Pneumo Ag Negative    POC Glucose Once [[id number]]  (Abnormal) Collected: 04/04/24 0723    Specimen: Blood Updated: 04/04/24 0725     Glucose 131 mg/dL     Basic Metabolic Panel [[id number]]  (Abnormal) Collected: 04/04/24 0918    Specimen: Blood Updated: 04/04/24 0952     Glucose 211 mg/dL      BUN 18 mg/dL      Creatinine 0.80 mg/dL      Sodium 133 mmol/L      Potassium 4.7 mmol/L      Chloride 100 mmol/L      CO2 24.0 mmol/L      Calcium 8.4 mg/dL      BUN/Creatinine Ratio 22.5     Anion Gap 9.0 mmol/L      eGFR 90.0 mL/min/1.73     Narrative:      GFR Normal >60  Chronic Kidney Disease <60  Kidney Failure <15    The GFR formula is only valid for adults with stable renal function between ages 18 and 70.    CBC (No Diff) [[id number]]  (Abnormal) Collected: 04/04/24 0918    Specimen: Blood Updated: 04/04/24 0936     WBC 5.97 10*3/mm3      RBC 4.16 10*6/mm3      Hemoglobin 12.5 g/dL      Hematocrit 38.4 %      MCV 92.3 fL      MCH 30.0 pg      MCHC 32.6 g/dL      RDW 11.4 %      RDW-SD 39.2 fl      MPV 9.0 fL      Platelets 283 10*3/mm3     POC Glucose Once [[id number]]  (Abnormal) Collected:  04/04/24 1135    Specimen: Blood Updated: 04/04/24 1137     Glucose 163 mg/dL             Imaging Results (Last 24 Hours)       Procedure Component Value Units Date/Time    XR Chest 1 View [[id number]] Collected: 04/04/24 0225     Updated: 04/04/24 0229    Narrative:      SINGLE VIEW OF THE CHEST     HISTORY: Increasing shortness of breath     COMPARISON: March 3, 2024     FINDINGS:  Heart size is within normal limits. Patient is status post median  sternotomy with coronary artery bypass grafting. No pneumothorax or  pleural effusion is seen. There are background changes of COPD. Patient  is noted to have an infiltrate at the left lung base, suspicious for  pneumonia.       Impression:      Left basilar infiltrate, suspicious for pneumonia.     This report was finalized on 4/4/2024 2:26 AM by Dr. Rosa Sanders M.D on Workstation: [id number]               Results for orders placed during the hospital encounter of 11/16/23    Adult Transthoracic Echo Complete W/ Cont if Necessary Per Protocol    Interpretation Summary    There is a small to moderate size layered thrombus in the anterior apex    The left ventricular cavity is mild to moderately dilated.    The anteroseptum and apex are thinned, consistent with a prior infarct. There is akinesis of the mid to distal anteroseptum and the entire apex.    Left ventricular systolic function is moderately decreased. Left ventricular ejection fraction appears to be 36 - 40%.    Left ventricular diastolic function is consistent with (grade I) impaired relaxation.    Normal right ventricular cavity size and systolic function noted.    The left atrial cavity is mildly dilated.    Calculated right ventricular systolic pressure from tricuspid regurgitation is 29 mmHg.    There is no evidence of pericardial effusion.      ECG 12 Lead Dyspnea   Preliminary Result   HEART RATE= 89  bpm   RR Interval= 674  ms   AR Interval= 163  ms   P Horizontal Axis= 36  deg   P Front Axis= 72   deg   QRSD Interval= 154  ms   QT Interval= 387  ms   QTcB= 471  ms   QRS Axis= -35  deg   T Wave Axis= 166  deg   - ABNORMAL ECG -   Sinus rhythm   Multiple premature complexes, vent & supraven   LVH with secondary repolarization abnormality   Anterior Q waves, possibly due to LVH   Baseline wander in lead(s) V2   Electronically Signed By:    Date and Time of Study: 2024-04-04 02:15:28      Telemetry Scan   Final Result           Assessment/Plan     Active Hospital Problems    Diagnosis  POA    **COPD exacerbation [J44.1]  Yes    Chronic respiratory failure with hypoxia and hypercapnia [J96.11, J96.12]  Yes    Left lower lobe pneumonia [J18.9]  Yes    Paroxysmal atrial fibrillation [I48.0]  Yes    Ischemic cardiomyopathy [I25.5]  Yes    Hypertension [I10]  Yes    Atherosclerotic coronary vascular disease [I25.10]  Yes     Mr. Malone is a 78 y.o. male who presented to the hospital with an acute onset of dyspnea.  He had increased work of breathing requiring BiPAP.  He has a known history of chronic respiratory failure and COPD and compliant with nightly BiPAP at home.  He also has a known history of ischemic cardiomyopathy and PAF on Eliquis.  Chest x-ray on admission revealed left basilar infiltrate suspicious for pneumonia.    COPD exacerbation  Left lower lobe pneumonia  Chronic respiratory failure with hypoxia and hypercapnia  -Pulmonology consulted in the emergency room.  -Continue BiPAP per their recommendations.  -Continue IV steroids and DuoNebs.  -Currently on empiric cefepime.  Urine antigens negative.    Ischemic cardiomyopathy  Paroxysmal atrial fibrillation  -Seen in the past by [facility] cardiology, but does not appear to routinely see cardiologist in the outpatient setting.  -Echocardiogram in November 2023 with EF 36 to 40%.  Was on Entresto at that time.  -Will resume home regimen including Eliquis once confirmed by staff.  -Monitor closely on telemetry.  -HS trop down-trending. No chest pain. BNP  normal.    HTN  -BP stable. Resume home regimen once confirmed.    Hyponatremia  -Mildly low and clinically insignificant at this time.   -Monitor labs.    I discussed the patients findings and my recommendations with patient and nursing staff.    VTE Prophylaxis - Eliquis (home med).  Code Status - Full code. Confirmed with patient.    Nursing awaiting home med list from patient's wife. Will resume once notified by nursing.       ZAIDA Christensen  [facility]  04/04/24  12:02 EDT

## 2024-04-04 NOTE — CONSULTS
Referring Provider: Dr. Araujo  Reason for Consultation: Respiratory failure    Patient Care Team:  System, Provider Not In as PCP - General    Chief complaint:   Dyspnea    History of present illness:    Subjective   This is a 78-year-old male patient with history of COPD and chronic respiratory failure, on BiPAP/NIPPV at home.  Apparently he does not see a pulmonologist.  He is on Symbicort and albuterol usually.    He was recently hospitalized for COPD exacerbation was discharged on 3/10/2024.    He presented to the ED today for shortness of breath.  Symptoms started 1 day PTP.  He had mild associated cough with whitish phlegm.  No relief with the use of his usual inhalers..  He was tachypneic and using accessory muscles.  EMS assessment and was placed on CPAP.  In ED, he was given a trial of the CPAP but his respiratory rate increased to 44 breaths/min and therefore he was placed back on it.    Labs:  ABG 7.3/45/57 on 4 L.  Procalcitonin normal.    Review of Systems  Constitutional: No fever or chills.   ENMT: No sinus congestion  Cardiovascular: No chest pain, palpitation or legs swelling.    Respiratory: See above.  Gastrointestinal: No constipation, diarrhea or abdominal pain.  No nausea or vomiting.  Neurology: No headache, weakness, numbness or dizziness.   Musculoskeletal: No joints pain, stiffness or swelling.   Psychiatry: No depression.  Genitourinary: No dysuria or frequent urination  Endo: No weight changes. No cold or warm intolerance.  Lymphatic: No swollen glands.  Integumentary: No rash.    History  Past Medical History:   Diagnosis Date    COPD (chronic obstructive pulmonary disease)     Elevated cholesterol     GERD (gastroesophageal reflux disease)     Hypertension    ,   Past Surgical History:   Procedure Laterality Date    CORONARY ARTERY BYPASS GRAFT  1996   , History reviewed. No pertinent family history.,   Social History     Socioeconomic History    Marital status:     Tobacco Use    Smoking status: Former     Average packs/day: 1 pack/day for 62.0 years (62.0 ttl pk-yrs)     Types: Cigarettes     Start date: 1960    Smokeless tobacco: Never   Vaping Use    Vaping status: Never Used   Substance and Sexual Activity    Alcohol use: Never    Drug use: Never    Sexual activity: Yes     Partners: Female     E-cigarette/Vaping    E-cigarette/Vaping Use Never User     Passive Exposure No     Counseling Given No      E-cigarette/Vaping Substances    Nicotine No     THC No     CBD No     Flavoring No      E-cigarette/Vaping Devices    Disposable No     Pre-filled or Refillable Cartridge No     Refillable Tank No     Pre-filled Pod No          ,   Medications Prior to Admission   Medication Sig Dispense Refill Last Dose    albuterol sulfate  (90 Base) MCG/ACT inhaler Inhale 2 puffs Every 4 (Four) Hours As Needed for Wheezing.       apixaban (ELIQUIS) 5 MG tablet tablet Take 1 tablet by mouth Every 12 (Twelve) Hours.       aspirin 81 MG EC tablet Take 1 tablet by mouth Daily.       atorvastatin (LIPITOR) 80 MG tablet Take 1 tablet by mouth Daily.       budesonide-formoterol (SYMBICORT) 160-4.5 MCG/ACT inhaler Inhale 2 puffs 2 (Two) Times a Day. 10.2 g 0     dextromethorphan-guaifenesin (ROBITUSSIN-DM)  MG/5ML syrup Take 5 mL by mouth Every 6 (Six) Hours As Needed.       famotidine (PEPCID) 20 MG tablet Take 1 tablet by mouth 2 (Two) Times a Day.       fluticasone (FLONASE) 50 MCG/ACT nasal spray 2 sprays into the nostril(s) as directed by provider Daily.       folic acid (FOLVITE) 1 MG tablet Take 1 tablet by mouth Daily.       guaiFENesin (MUCINEX) 600 MG 12 hr tablet Take 2 tablets by mouth 2 (Two) Times a Day.       ipratropium-albuterol (COMBIVENT RESPIMAT)  MCG/ACT inhaler Inhale 1 puff 4 (Four) Times a Day As Needed for Wheezing.       metoprolol tartrate (LOPRESSOR) 50 MG tablet Take 1.5 tablets by mouth 2 (Two) Times a Day.       polyethylene glycol (MIRALAX) 17  g packet Take 17 g by mouth Daily.       predniSONE (DELTASONE) 5 MG tablet Take 1 tablet by mouth Daily.       sacubitril-valsartan (ENTRESTO) 24-26 MG tablet Take 1 tablet by mouth 2 (Two) Times a Day.       tiotropium bromide monohydrate (SPIRIVA RESPIMAT) 2.5 MCG/ACT aerosol solution inhaler Inhale 2 puffs Daily.       vitamin B-12 (CYANOCOBALAMIN) 500 MCG tablet Take 2 tablets by mouth Daily.       vitamin D (ERGOCALCIFEROL) 1.25 MG (03624 UT) capsule capsule Take 1 capsule by mouth 1 (One) Time Per Week.      , Scheduled Meds:  cefepime, 2,000 mg, Intravenous, Q8H  insulin lispro, 2-7 Units, Subcutaneous, 4x Daily AC & at Bedtime  [START ON 4/5/2024] methylPREDNISolone sodium succinate, 40 mg, Intravenous, Daily  sodium chloride, 10 mL, Intravenous, Q12H   , Continuous Infusions:   , PRN Meds:    acetaminophen **OR** acetaminophen **OR** acetaminophen    albuterol    albuterol    senna-docusate sodium **AND** polyethylene glycol **AND** bisacodyl **AND** bisacodyl    dextrose    dextrose    glucagon (human recombinant)    ipratropium-albuterol    nitroglycerin    [COMPLETED] Insert Peripheral IV **AND** sodium chloride    sodium chloride    sodium chloride, and Allergies:  Patient has no known allergies.    Objective     Vital Signs   Temp:  [96.6 °F (35.9 °C)-98.2 °F (36.8 °C)] 98.2 °F (36.8 °C)  Heart Rate:  [82-88] 88  Resp:  [20-44] 20  BP: (102-172)/(68-79) 102/68    PPE used per hospital policy    Physical Exam:  Constitutional: Not in acute distress.  Eyes: Injected conjunctivae, EOMI. pupils equal reactive to light.  ENMT: Mouth not examined due to presence of mask.  External ears normal.  Neck: Trachea midline. No thyromegaly  Heart: RRR, no murmur  Lungs: Equal but significantly diminished air entry throughout.  No audible crackles or wheezing.  Abdomen:  Soft. No tenderness or dullness. No HSM.  Extremities: No cyanosis, clubbing or pitting edema.  Warm extremities and well-perfused.  Neuro:  Conscious, alert, oriented x3.  Strength 5/5 in arms.  Psych: Appropriate mood and affect.    Integumentary: No rash.  Normal skin turgor  Lymphatic: No palpable cervical or supraclavicular lymph nodes.      Diagnostic imaging:  I personally and independently reviewed the following images:   CXR 4/4/2024: LLL opacity.    Laboratory workup:    Results from last 7 days   Lab Units 04/04/24  0213   SODIUM mmol/L 133*   POTASSIUM mmol/L 4.5   CHLORIDE mmol/L 97*   CO2 mmol/L 26.7   BUN mg/dL 16   CREATININE mg/dL 0.95   GLUCOSE mg/dL 143*   CALCIUM mg/dL 9.0     Results from last 7 days   Lab Units 04/04/24  0328 04/04/24  0213   HSTROP T ng/L 38* 33*     Results from last 7 days   Lab Units 04/04/24  0213   WBC 10*3/mm3 9.62   HEMOGLOBIN g/dL 13.3   HEMATOCRIT % 41.6   PLATELETS 10*3/mm3 332     Results from last 7 days   Lab Units 04/04/24  0213   INR  1.09   APTT seconds 33.4     Results from last 7 days   Lab Units 04/04/24  0213   PROBNP pg/mL 1,065.0       Assessment     COPD exacerbation  LLL opacity, possible pneumonia  Chronic hypercapnic respiratory failure, on trilogy ventilator    Recommendations:    Solu-Medrol 40 mg daily  DuoNeb 4 times a day  Empiric antibiotics with cefepime.  Check sputum culture and urine strep antigen.  Normal WBC and Pro-Suman argue against infection but there is clear opacity in the LLL.  Eventually he will require follow-up CXR in a month and if persistent infiltrates then may need to do a CT of the chest  NIPPV at night.  He can bring his home Trilogy.  Oxygen by NC and titrate to keep SpO2 >90%      Rob Marquez MD  04/04/24  06:34 EDT

## 2024-04-04 NOTE — PLAN OF CARE
Goal Outcome Evaluation:  Plan of Care Reviewed With: patient    Progress: improving  Outcome Evaluation: Mr. Malone admitted on 4/4 for COPD exacerbation. Wore BiPAP all day. Very tachypneic when removed - respirations into 40's when removed. Dry cough. Pulmonology consulted. IV cefepime continued. IV Solumedrol ordered. No family at bedside. Wife to bring up medication list. Patient stable and needs met at this time.

## 2024-04-04 NOTE — ED NOTES
Nursing report ED to floor  Wisam Malone  78 y.o.  male    HPI :  HPI (Adult)  Stated Reason for Visit: shortness of breath x 2 days, worsening tonight  History Obtained From: patient, EMS    Chief Complaint  Chief Complaint   Patient presents with    Shortness of Breath       Admitting doctor:   Maryanne Dover MD    Admitting diagnosis:   The primary encounter diagnosis was COPD exacerbation. A diagnosis of Pneumonia due to infectious organism, unspecified laterality, unspecified part of lung was also pertinent to this visit.    Code status:   Current Code Status       Date Active Code Status Order ID Comments User Context       4/4/2024 0435 CPR (Attempt to Resuscitate) [id number]  Gloria Godoy, APRN ED        Question Answer    Code Status (Patient has no pulse and is not breathing) CPR (Attempt to Resuscitate)    Medical Interventions (Patient has pulse or is breathing) Full Support                    Allergies:   Patient has no known allergies.    Isolation:   Enhanced Droplet/Contact     Intake and Output    Intake/Output Summary (Last 24 hours) at 4/4/2024 0447  Last data filed at 4/4/2024 0446  Gross per 24 hour   Intake 100 ml   Output --   Net 100 ml       Weight:       04/04/24  0208   Weight: 68 kg (150 lb)       Most recent vitals:   Vitals:    04/04/24 0225 04/04/24 0236 04/04/24 0306 04/04/24 0414   BP:  115/71 123/71    Pulse: 82 84 85    Resp:    22   Temp:       TempSrc:       SpO2: 99% 95% 100% 99%   Weight:       Height:           Active LDAs/IV Access:   Lines, Drains & Airways       Active LDAs       Name Placement date Placement time Site Days    Peripheral IV 04/04/24 0201 Anterior;Distal;Left;Upper Arm 04/04/24  0201  Arm  less than 1                    Labs (abnormal labs have a star):   Labs Reviewed   COMPREHENSIVE METABOLIC PANEL - Abnormal; Notable for the following components:       Result Value    Glucose 143 (*)     Sodium 133 (*)     Chloride 97 (*)     Alkaline  Phosphatase 145 (*)     All other components within normal limits    Narrative:     GFR Normal >60  Chronic Kidney Disease <60  Kidney Failure <15    The GFR formula is only valid for adults with stable renal function between ages 18 and 70.   PROTIME-INR - Abnormal; Notable for the following components:    Protime 14.4 (*)     All other components within normal limits   TROPONIN - Abnormal; Notable for the following components:    HS Troponin T 33 (*)     All other components within normal limits    Narrative:     High Sensitive Troponin T Reference Range:  <14.0 ng/L- Negative Female for AMI  <22.0 ng/L- Negative Male for AMI  >=14 - Abnormal Female indicating possible myocardial injury.  >=22 - Abnormal Male indicating possible myocardial injury.   Clinicians would have to utilize clinical acumen, EKG, Troponin, and serial changes to determine if it is an Acute Myocardial Infarction or myocardial injury due to an underlying chronic condition.        BLOOD GAS, ARTERIAL - Abnormal; Notable for the following components:    pCO2, Arterial 45.2 (*)     pO2, Arterial 57.7 (*)     O2 Saturation, Arterial 88.6 (*)     CO2 Content 27.7 (*)     All other components within normal limits   CBC WITH AUTO DIFFERENTIAL - Abnormal; Notable for the following components:    RDW 11.3 (*)     Neutrophil % 76.8 (*)     Lymphocyte % 13.5 (*)     Immature Grans % 0.8 (*)     Neutrophils, Absolute 7.38 (*)     Immature Grans, Absolute 0.08 (*)     All other components within normal limits   HIGH SENSITIVITIY TROPONIN T 2HR - Abnormal; Notable for the following components:    HS Troponin T 38 (*)     Troponin T Delta 5 (*)     All other components within normal limits    Narrative:     High Sensitive Troponin T Reference Range:  <14.0 ng/L- Negative Female for AMI  <22.0 ng/L- Negative Male for AMI  >=14 - Abnormal Female indicating possible myocardial injury.  >=22 - Abnormal Male indicating possible myocardial injury.   Clinicians  would have to utilize clinical acumen, EKG, Troponin, and serial changes to determine if it is an Acute Myocardial Infarction or myocardial injury due to an underlying chronic condition.        RESPIRATORY PANEL PCR W/ COVID-19 (SARS-COV-2), NP SWAB IN UTM/VTP, 2 HR TAT - Normal    Narrative:     In the setting of a positive respiratory panel with a viral infection PLUS a negative procalcitonin without other underlying concern for bacterial infection, consider observing off antibiotics or discontinuation of antibiotics and continue supportive care. If the respiratory panel is positive for atypical bacterial infection (Bordetella pertussis, Chlamydophila pneumoniae, or Mycoplasma pneumoniae), consider antibiotic de-escalation to target atypical bacterial infection.   APTT - Normal   BNP (IN-HOUSE) - Normal    Narrative:     This assay is used as an aid in the diagnosis of individuals suspected of having heart failure. It can be used as an aid in the diagnosis of acute decompensated heart failure (ADHF) in patients presenting with signs and symptoms of ADHF to the emergency department (ED). In addition, NT-proBNP of <300 pg/mL indicates ADHF is not likely.    Age Range Result Interpretation  NT-proBNP Concentration (pg/mL:      <50             Positive            >450                   Gray                 300-450                    Negative             <300    50-75           Positive            >900                  Gray                300-900                  Negative            <300      >75             Positive            >1800                  Gray                300-1800                  Negative            <300   MAGNESIUM - Normal   PROCALCITONIN - Normal    Narrative:     As a Marker for Sepsis (Non-Neonates):    1. <0.5 ng/mL represents a low risk of severe sepsis and/or septic shock.  2. >2 ng/mL represents a high risk of severe sepsis and/or septic shock.    As a Marker for Lower Respiratory Tract  "Infections that require antibiotic therapy:    PCT on Admission    Antibiotic Therapy       6-12 Hrs later    >0.5                Strongly Recommended  >0.25 - <0.5        Recommended   0.1 - 0.25          Discouraged              Remeasure/reassess PCT  <0.1                Strongly Discouraged     Remeasure/reassess PCT    As 28 day mortality risk marker: \"Change in Procalcitonin Result\" (>80% or <=80%) if Day 0 (or Day 1) and Day 4 values are available. Refer to http://[email or web address]-pct-calculator.com    Change in PCT <=80%  A decrease of PCT levels below or equal to 80% defines a positive change in PCT test result representing a higher risk for 28-day all-cause mortality of patients diagnosed with severe sepsis for septic shock.    Change in PCT >80%  A decrease of PCT levels of more than 80% defines a negative change in PCT result representing a lower risk for 28-day all-cause mortality of patients diagnosed with severe sepsis or septic shock.      LACTIC ACID, PLASMA - Normal   BLOOD CULTURE   BLOOD CULTURE   RESPIRATORY CULTURE   LEGIONELLA ANTIGEN, URINE   STREP PNEUMO AG, URINE OR CSF   BASIC METABOLIC PANEL   CBC (NO DIFF)   POCT GLUCOSE FINGERSTICK   POCT GLUCOSE FINGERSTICK   POCT GLUCOSE FINGERSTICK   POCT GLUCOSE FINGERSTICK   CBC AND DIFFERENTIAL    Narrative:     The following orders were created for panel order CBC & Differential.  Procedure                               Abnormality         Status                     ---------                               -----------         ------                     CBC Auto Differential[[id number]]        Abnormal            Final result                 Please view results for these tests on the individual orders.       EKG:   ECG 12 Lead Dyspnea   Preliminary Result   HEART RATE= 89  bpm   RR Interval= 674  ms   IA Interval= 163  ms   P Horizontal Axis= 36  deg   P Front Axis= 72  deg   QRSD Interval= 154  ms   QT Interval= 387  ms   QTcB= 471  ms   QRS Axis= -35  " deg   T Wave Axis= 166  deg   - ABNORMAL ECG -   Sinus rhythm   Multiple premature complexes, vent & supraven   LVH with secondary repolarization abnormality   Anterior Q waves, possibly due to LVH   Baseline wander in lead(s) V2   Electronically Signed By:    Date and Time of Study: 2024-04-04 02:15:28          Meds given in ED:   Medications   sodium chloride 0.9 % flush 10 mL (has no administration in time range)   sodium chloride 0.9 % flush 10 mL (has no administration in time range)   sodium chloride 0.9 % flush 10 mL (has no administration in time range)   sodium chloride 0.9 % infusion 40 mL (has no administration in time range)   nitroglycerin (NITROSTAT) SL tablet 0.4 mg (has no administration in time range)   albuterol (PROVENTIL) nebulizer solution 0.083% 2.5 mg/3mL (has no administration in time range)   albuterol (PROVENTIL) nebulizer solution 0.083% 2.5 mg/3mL (has no administration in time range)   sennosides-docusate (PERICOLACE) 8.6-50 MG per tablet 2 tablet (has no administration in time range)     And   polyethylene glycol (MIRALAX) packet 17 g (has no administration in time range)     And   bisacodyl (DULCOLAX) EC tablet 5 mg (has no administration in time range)     And   bisacodyl (DULCOLAX) suppository 10 mg (has no administration in time range)   acetaminophen (TYLENOL) tablet 650 mg (has no administration in time range)     Or   acetaminophen (TYLENOL) 160 MG/5ML oral solution 650 mg (has no administration in time range)     Or   acetaminophen (TYLENOL) suppository 650 mg (has no administration in time range)   ipratropium-albuterol (DUO-NEB) nebulizer solution 3 mL (has no administration in time range)   cefepime 2000 mg IVPB in 100 mL NS (MBP) (has no administration in time range)   methylPREDNISolone sodium succinate (SOLU-Medrol) injection 40 mg (has no administration in time range)   dextrose (GLUTOSE) oral gel 15 g (has no administration in time range)   dextrose (D50W) (25 g/50 mL)  IV injection 25 g (has no administration in time range)   glucagon (GLUCAGEN) injection 1 mg (has no administration in time range)   insulin lispro (HUMALOG/ADMELOG) injection 2-7 Units (has no administration in time range)   ipratropium-albuterol (DUO-NEB) nebulizer solution 3 mL (3 mL Nebulization Given 4/4/24 0218)   methylPREDNISolone sodium succinate (SOLU-Medrol) injection 125 mg (125 mg Intravenous Given 4/4/24 0227)   cefepime 2000 mg IVPB in 100 mL NS (MBP) (0 mg Intravenous Stopped 4/4/24 4026)       Imaging results:  XR Chest 1 View    Result Date: 4/4/2024  Left basilar infiltrate, suspicious for pneumonia.  This report was finalized on 4/4/2024 2:26 AM by Dr. Rosa Sanders M.D on Workstation: [id number]       Ambulatory status:   - with assist    Social issues:   Social History     Socioeconomic History    Marital status:    Tobacco Use    Smoking status: Former     Average packs/day: 1 pack/day for 62.0 years (62.0 ttl pk-yrs)     Types: Cigarettes     Start date: 1960    Smokeless tobacco: Never   Vaping Use    Vaping status: Never Used   Substance and Sexual Activity    Drug use: Never       Peripheral Neurovascular  Peripheral Neurovascular (Adult)  Peripheral Neurovascular WDL: WDL    Neuro Cognitive  Neuro Cognitive (Adult)  Cognitive/Neuro/Behavioral WDL: WDL, level of consciousness, orientation  Level of Consciousness: Alert  Orientation: oriented x 4    Learning  Learning Assessment (Adult)  Learning Readiness and Ability: no barriers identified    Respiratory  Respiratory WDL  Respiratory WDL: all  Rhythm/Pattern, Respiratory: shortness of breath  Expansion/Accessory Muscles/Retractions: abdominal muscle use, accessory muscle use  Cough Frequency: infrequent  Cough Type: congested, fair  Breath Sounds  Breath Sounds: All Fields  All Lung Fields Breath Sounds: wheezes, expiratory (crackles in bases)    Abdominal Pain       Pain Assessments  Pain (Adult)  (0-10) Pain Rating: Rest:  0    NIH Stroke Scale       Deep Mar RN  04/04/24 04:47 EDT

## 2024-04-04 NOTE — PROGRESS NOTES
"Nutrition Services    Patient Name:  Wisam Malone  YOB: 1945  MRN: [id number]  Admit Date:  4/4/2024    Assessment Date:  04/04/24    Summary: Initial Assessment for MST 2  Pt is a 79 y/o male who presented with SOB. Pt has a hx of COPD, CAD. HTN, cardiomyopathy, PA, GERD.  Pt receiving breathing treatment when I visited but was able to shake his head yes and no to questions. Wife at bedside also helped provide hx. Pt reported his appetite is normally very good and he typically eats 75% of 3 meals per day. Wife followed this up by saying sometimes pt is not able to eat very much due to his COPD. Pt denied any recent loss. Per EMR, pt has had a 9.1% weight loss since 11/23 (not significant). Pt denied any chew/swallow issues or n/v. Pt reported he does drink about 1 Ensure per day and was agreeable to Ensure while admitted. RD to initiate Ensure QD and continue following.     CLINICAL NUTRITION ASSESSMENT      Reason for Assessment MST score 2+     Diagnosis/Problem   SOB. Pt has a hx of COPD, CAD. HTN, cardiomyopathy, PA, GERD.   Medical/Surgical History Past Medical History:   Diagnosis Date    COPD (chronic obstructive pulmonary disease)     Elevated cholesterol     GERD (gastroesophageal reflux disease)     Hypertension        Past Surgical History:   Procedure Laterality Date    CORONARY ARTERY BYPASS GRAFT  1996        Anthropometrics        Current Height  Current Weight  BMI kg/m2 Height: 182.9 cm (72\")  Weight: 68.7 kg (151 lb 7.3 oz) (04/04/24 0550)  Body mass index is 20.54 kg/m².   Adjusted BMI (if applicable)    BMI Category Normal/Healthy (18.4 - 24.9)   Ideal Body Weight (IBW) 171 lbs   Usual Body Weight (UBW) 165 lbs   Weight Trend Loss   Weight History Wt Readings from Last 30 Encounters:   04/04/24 0550 68.7 kg (151 lb 7.3 oz)   04/04/24 0208 68 kg (150 lb)   03/08/24 0625 67.9 kg (149 lb 9.6 oz)   03/08/24 0341 72.6 kg (160 lb)   11/20/23 0500 74.5 kg (164 lb 3.9 oz)   11/19/23 " "0619 75.6 kg (166 lb 10.7 oz)   11/18/23 1038 73.9 kg (163 lb)   11/18/23 0540 74 kg (163 lb 2.3 oz)   11/16/23 2230 72.6 kg (160 lb)        Estimated/Assessed Needs        Energy Requirements    Weight for Calculation 69 kg    Method for Estimation  25-30 kcal/kg   EST Needs (kcal/day) 0400-4672       Protein Requirements    Weight for Calculation 69 kg   EST Protein Needs (g/kg) 1.2 gm/kg   EST Daily Needs (g/day) 83       Fluid Requirements     Method for Estimation 1 mL/kcal    Estimated Needs (mL/day)        Fluid Deficit    Current Na Level (mEq/L)    Desired Na Level (mEq/L)    Estimated Fluid Deficit (L)       Labs       Pertinent Labs    Results from last 7 days   Lab Units 04/04/24  0918 04/04/24  0213   SODIUM mmol/L 133* 133*   POTASSIUM mmol/L 4.7 4.5   CHLORIDE mmol/L 100 97*   CO2 mmol/L 24.0 26.7   BUN mg/dL 18 16   CREATININE mg/dL 0.80 0.95   CALCIUM mg/dL 8.4* 9.0   BILIRUBIN mg/dL  --  0.5   ALK PHOS U/L  --  145*   ALT (SGPT) U/L  --  40   AST (SGOT) U/L  --  23   GLUCOSE mg/dL 211* 143*     Results from last 7 days   Lab Units 04/04/24  0918 04/04/24  0213   MAGNESIUM mg/dL  --  2.0   HEMOGLOBIN g/dL 12.5* 13.3   HEMATOCRIT % 38.4 41.6   WBC 10*3/mm3 5.97 9.62   ALBUMIN g/dL  --  3.8     Results from last 7 days   Lab Units 04/04/24  0918 04/04/24  0213   INR   --  1.09   APTT seconds  --  33.4   PLATELETS 10*3/mm3 283 332     COVID19   Date Value Ref Range Status   04/04/2024 Not Detected Not Detected - Ref. Range Final     No results found for: \"HGBA1C\"       Medications           Scheduled Medications cefepime, 2,000 mg, Intravenous, Q8H  insulin lispro, 2-7 Units, Subcutaneous, 4x Daily AC & at Bedtime  [START ON 4/5/2024] methylPREDNISolone sodium succinate, 40 mg, Intravenous, Daily  sodium chloride, 10 mL, Intravenous, Q12H       Infusions     PRN Medications   acetaminophen **OR** acetaminophen **OR** acetaminophen    albuterol    albuterol    senna-docusate sodium **AND** polyethylene " glycol **AND** bisacodyl **AND** bisacodyl    dextrose    dextrose    glucagon (human recombinant)    ipratropium-albuterol    nitroglycerin    [COMPLETED] Insert Peripheral IV **AND** sodium chloride    sodium chloride    sodium chloride     Physical Findings          General Findings alert, oriented, on oxygen therapy   Oral/Mouth Cavity tooth or teeth missing   Edema  not assessed   Gastrointestinal WDL, last bowel movement: 4/3   Skin  location of wound: coccyx, right anterior plantar, nose   Tubes/Drains/Lines none   NFPE Not indicated at this time   --  Current Nutrition Orders & Evaluation of Intake       Oral Nutrition     Food Allergies NKFA   Current PO Diet Diet: Cardiac; Healthy Heart (2-3 Na+); Fluid Consistency: Thin (IDDSI 0)   Supplement n/a   PO Evaluation     % PO Intake Variable intake depending on breathing     Factors Affecting Intake: altered respiratory status     PES STATEMENT / NUTRITION DIAGNOSIS      Nutrition Dx Problem  Problem: Unintentional Weight Loss  Etiology: Medical Diagnosis - SOB. Pt has a hx of COPD, CAD. HTN, cardiomyopathy, PA, GERD.    Signs/Symptoms: Unintended Weight Change   --  NUTRITION INTERVENTION / PLAN OF CARE      Intervention Goal(s) Maintain nutrition status, Establish goals of care, Meet estimated needs, Increase intake, Accepts oral nutrition supplement, Maintain weight, No significant weight loss, and PO intake goal %: 75%         RD Intervention/Action Encourage intake, Continue to monitor, Care plan reviewed, and Recommend/order: Ensure QD         Prescription/Orders:       PO Diet       Supplements Ensure QD      Enteral Nutrition       Parenteral Nutrition    New Prescription Ordered? Yes   --      Monitor/Evaluation Per protocol, PO intake, Supplement intake, Weight, Skin status, GI status, Symptoms, POC/GOC, Swallow function   Discharge Plan/Needs No discharge needs identified at this time   --    RD to follow per protocol.      Electronically signed  by:  Humble Mancera  04/04/24 13:56 EDT

## 2024-04-04 NOTE — ED NOTES
Pt arrives via EMS from home with complaints of increased SOB, EMS states pt O2 was 91% on Bipap at arrival breathing 50 times per minute. Pt states he has been having worsening shortness of breath for 2 days, pt normally wears 2-4 L NC per EMS. EMS placed pt on CPAP upon arrival with O2 sat at 93%. Pt normally BiPAP at home PRN.

## 2024-04-04 NOTE — ED PROVIDER NOTES
EMERGENCY DEPARTMENT ENCOUNTER    Room Number:  E467/1  PCP: System, Provider Not In  Patient Care Team:  System, Provider Not In as PCP - General   Independent Historians: Patient    HPI:  Chief Complaint: Dyspnea    A complete HPI/ROS/PMH/PSH/SH/FH are unobtainable due to: None    Chronic or social conditions impacting patient care (Social Determinants of Health): None  (Financial Resource Strain / Food Insecurity / Transportation Needs / Physical Activity / Stress / Social Connections / Intimate Partner Violence / Housing Stability)    Context: Wisam Malone is a 78 y.o. male who presents to the ED c/o acute dyspnea.  Patient reports worsening shortness of breath over the past 2 days.  Patient has history of COPD and CHF.  States that he has orthopnea at baseline.  Denies any pain no fever chills no significant cough.  Patient was quite tachypneic on arrival for EMS.  Placed him on CPAP which improved his respiratory function.    Review of prior external notes (non-ED) -and- Review of prior external test results outside of this encounter: I reviewed patient's discharge summary from 3/8/2024    Prescription drug monitoring program review:         PAST MEDICAL HISTORY  Active Ambulatory Problems     Diagnosis Date Noted    COPD exacerbation 03/08/2024    Atherosclerotic coronary vascular disease 03/08/2024    Hypertension 03/08/2024    Weight loss 03/08/2024    Ischemic cardiomyopathy 03/08/2024    Paroxysmal atrial fibrillation 03/08/2024    LV (left ventricular) mural thrombus 03/09/2024     Resolved Ambulatory Problems     Diagnosis Date Noted    Septic shock 11/17/2023     Past Medical History:   Diagnosis Date    COPD (chronic obstructive pulmonary disease)     Elevated cholesterol     GERD (gastroesophageal reflux disease)          PAST SURGICAL HISTORY  Past Surgical History:   Procedure Laterality Date    CORONARY ARTERY BYPASS GRAFT  1996         FAMILY HISTORY  History reviewed. No pertinent family  history.      SOCIAL HISTORY  Social History     Socioeconomic History    Marital status:    Tobacco Use    Smoking status: Former     Average packs/day: 1 pack/day for 62.0 years (62.0 ttl pk-yrs)     Types: Cigarettes     Start date: 1960    Smokeless tobacco: Never   Vaping Use    Vaping status: Never Used   Substance and Sexual Activity    Alcohol use: Never    Drug use: Never    Sexual activity: Yes     Partners: Female         ALLERGIES  Patient has no known allergies.        REVIEW OF SYSTEMS  Review of Systems  Included in HPI  All systems reviewed and negative except for those discussed in HPI.      PHYSICAL EXAM    I have reviewed the triage vital signs and nursing notes.    ED Triage Vitals   Temp Pulse Resp BP SpO2   -- -- -- -- --      Temp src Heart Rate Source Patient Position BP Location FiO2 (%)   -- -- -- -- --       Physical Exam  GENERAL: alert, mild acute distress  SKIN: Warm, dry  HENT: Normocephalic, atraumatic  EYES: no scleral icterus  CV: regular rhythm, regular rate  RESPIRATORY: Increased respiratory effort, wheezing bilaterally utilizing accessory muscles of respiration   ABDOMEN: soft, nontender, nondistended  MUSCULOSKELETAL: no deformity  NEURO: alert, moves all extremities, follows commands                                                               LAB RESULTS  Recent Results (from the past 24 hour(s))   Comprehensive Metabolic Panel    Collection Time: 04/04/24  2:13 AM    Specimen: Blood   Result Value Ref Range    Glucose 143 (H) 65 - 99 mg/dL    BUN 16 8 - 23 mg/dL    Creatinine 0.95 0.76 - 1.27 mg/dL    Sodium 133 (L) 136 - 145 mmol/L    Potassium 4.5 3.5 - 5.2 mmol/L    Chloride 97 (L) 98 - 107 mmol/L    CO2 26.7 22.0 - 29.0 mmol/L    Calcium 9.0 8.6 - 10.5 mg/dL    Total Protein 6.8 6.0 - 8.5 g/dL    Albumin 3.8 3.5 - 5.2 g/dL    ALT (SGPT) 40 1 - 41 U/L    AST (SGOT) 23 1 - 40 U/L    Alkaline Phosphatase 145 (H) 39 - 117 U/L    Total Bilirubin 0.5 0.0 - 1.2 mg/dL     Globulin 3.0 gm/dL    A/G Ratio 1.3 g/dL    BUN/Creatinine Ratio 16.8 7.0 - 25.0    Anion Gap 9.3 5.0 - 15.0 mmol/L    eGFR 81.4 >60.0 mL/min/1.73   Protime-INR    Collection Time: 04/04/24  2:13 AM    Specimen: Blood   Result Value Ref Range    Protime 14.4 (H) 11.7 - 14.2 Seconds    INR 1.09 0.90 - 1.10   aPTT    Collection Time: 04/04/24  2:13 AM    Specimen: Blood   Result Value Ref Range    PTT 33.4 22.7 - 35.4 seconds   BNP    Collection Time: 04/04/24  2:13 AM    Specimen: Blood   Result Value Ref Range    proBNP 1,065.0 0.0 - 1,800.0 pg/mL   High Sensitivity Troponin T    Collection Time: 04/04/24  2:13 AM    Specimen: Blood   Result Value Ref Range    HS Troponin T 33 (H) <22 ng/L   Magnesium    Collection Time: 04/04/24  2:13 AM    Specimen: Blood   Result Value Ref Range    Magnesium 2.0 1.6 - 2.4 mg/dL   Procalcitonin    Collection Time: 04/04/24  2:13 AM    Specimen: Blood   Result Value Ref Range    Procalcitonin 0.05 0.00 - 0.25 ng/mL   CBC Auto Differential    Collection Time: 04/04/24  2:13 AM    Specimen: Blood   Result Value Ref Range    WBC 9.62 3.40 - 10.80 10*3/mm3    RBC 4.57 4.14 - 5.80 10*6/mm3    Hemoglobin 13.3 13.0 - 17.7 g/dL    Hematocrit 41.6 37.5 - 51.0 %    MCV 91.0 79.0 - 97.0 fL    MCH 29.1 26.6 - 33.0 pg    MCHC 32.0 31.5 - 35.7 g/dL    RDW 11.3 (L) 12.3 - 15.4 %    RDW-SD 37.6 37.0 - 54.0 fl    MPV 8.7 6.0 - 12.0 fL    Platelets 332 140 - 450 10*3/mm3    Neutrophil % 76.8 (H) 42.7 - 76.0 %    Lymphocyte % 13.5 (L) 19.6 - 45.3 %    Monocyte % 7.5 5.0 - 12.0 %    Eosinophil % 0.8 0.3 - 6.2 %    Basophil % 0.6 0.0 - 1.5 %    Immature Grans % 0.8 (H) 0.0 - 0.5 %    Neutrophils, Absolute 7.38 (H) 1.70 - 7.00 10*3/mm3    Lymphocytes, Absolute 1.30 0.70 - 3.10 10*3/mm3    Monocytes, Absolute 0.72 0.10 - 0.90 10*3/mm3    Eosinophils, Absolute 0.08 0.00 - 0.40 10*3/mm3    Basophils, Absolute 0.06 0.00 - 0.20 10*3/mm3    Immature Grans, Absolute 0.08 (H) 0.00 - 0.05 10*3/mm3    nRBC 0.0  0.0 - 0.2 /100 WBC   Respiratory Panel PCR w/COVID-19(SARS-CoV-2) [facility]/JAMI/MENA/PAD/[facility]/G[facility] In-House, NP Swab in UTM/VTM, 2 HR TAT - Swab, Nasopharynx    Collection Time: 04/04/24  2:14 AM    Specimen: Nasopharynx; Swab   Result Value Ref Range    ADENOVIRUS, PCR Not Detected Not Detected    Coronavirus 229E Not Detected Not Detected    Coronavirus HKU1 Not Detected Not Detected    Coronavirus NL63 Not Detected Not Detected    Coronavirus OC43 Not Detected Not Detected    COVID19 Not Detected Not Detected - Ref. Range    Human Metapneumovirus Not Detected Not Detected    Human Rhinovirus/Enterovirus Not Detected Not Detected    Influenza A PCR Not Detected Not Detected    Influenza B PCR Not Detected Not Detected    Parainfluenza Virus 1 Not Detected Not Detected    Parainfluenza Virus 2 Not Detected Not Detected    Parainfluenza Virus 3 Not Detected Not Detected    Parainfluenza Virus 4 Not Detected Not Detected    RSV, PCR Not Detected Not Detected    Bordetella pertussis pcr Not Detected Not Detected    Bordetella parapertussis PCR Not Detected Not Detected    Chlamydophila pneumoniae PCR Not Detected Not Detected    Mycoplasma pneumo by PCR Not Detected Not Detected   Blood Gas, Arterial -    Collection Time: 04/04/24  2:14 AM    Specimen: Arterial Blood   Result Value Ref Range    Site Left Brachial     Paul's Test Positive     pH, Arterial 7.372 7.350 - 7.450 pH units    pCO2, Arterial 45.2 (H) 35.0 - 45.0 mm Hg    pO2, Arterial 57.7 (L) 80.0 - 100.0 mm Hg    HCO3, Arterial 26.3 22.0 - 28.0 mmol/L    Base Excess, Arterial 0.5 0.0 - 2.0 mmol/L    O2 Saturation, Arterial 88.6 (L) 92.0 - 98.5 %    CO2 Content 27.7 (H) 23 - 27 mmol/L    Barometric Pressure for Blood Gas 737.3000 mmHg    Modality Cannula     Flow Rate 4.0000 lpm    Rate 44 Breaths/minute    Hemodilution No    ECG 12 Lead Dyspnea    Collection Time: 04/04/24  2:15 AM   Result Value Ref Range    QT Interval 387 ms    QTC Interval 471 ms   Lactic Acid,  Plasma    Collection Time: 04/04/24  3:28 AM    Specimen: Arm, Right; Blood   Result Value Ref Range    Lactate 1.1 0.5 - 2.0 mmol/L   High Sensitivity Troponin T 2Hr    Collection Time: 04/04/24  3:28 AM    Specimen: Arm, Right; Blood   Result Value Ref Range    HS Troponin T 38 (H) <22 ng/L    Troponin T Delta 5 (C) >=-4 - <+4 ng/L       I ordered the above labs and independently reviewed the results.        RADIOLOGY  XR Chest 1 View    Result Date: 4/4/2024  SINGLE VIEW OF THE CHEST  HISTORY: Increasing shortness of breath  COMPARISON: March 3, 2024  FINDINGS: Heart size is within normal limits. 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 No pneumothorax or pleural effusion is seen. There are background changes of COPD. Patient is noted to have an infiltrate at the left lung base, suspicious for pneumonia.      Left basilar infiltrate, suspicious for pneumonia.  This report was finalized on 4/4/2024 2:26 AM by Dr. Rosa Sanders M.D on Workstation: [id number]       I ordered the above noted radiological studies. Reviewed by me and discussed with radiologist.  See dictation for official radiology interpretation.      PROCEDURES    Procedures      MEDICATIONS GIVEN IN ER    Medications   sodium chloride 0.9 % flush 10 mL (has no administration in time range)   sodium chloride 0.9 % flush 10 mL (has no administration in time range)   sodium chloride 0.9 % flush 10 mL (has no administration in time range)   sodium chloride 0.9 % infusion 40 mL (has no administration in time range)   nitroglycerin (NITROSTAT) SL tablet 0.4 mg (has no administration in time range)   albuterol (PROVENTIL) nebulizer solution 0.083% 2.5 mg/3mL (has no administration in time range)   albuterol (PROVENTIL) nebulizer solution 0.083% 2.5 mg/3mL (has no administration in time range)   sennosides-docusate (PERICOLACE) 8.6-50 MG per tablet 2 tablet (has no administration in time range)     And    polyethylene glycol (MIRALAX) packet 17 g (has no administration in time range)     And   bisacodyl (DULCOLAX) EC tablet 5 mg (has no administration in time range)     And   bisacodyl (DULCOLAX) suppository 10 mg (has no administration in time range)   acetaminophen (TYLENOL) tablet 650 mg (has no administration in time range)     Or   acetaminophen (TYLENOL) 160 MG/5ML oral solution 650 mg (has no administration in time range)     Or   acetaminophen (TYLENOL) suppository 650 mg (has no administration in time range)   ipratropium-albuterol (DUO-NEB) nebulizer solution 3 mL (has no administration in time range)   cefepime 2000 mg IVPB in 100 mL NS (MBP) (has no administration in time range)   methylPREDNISolone sodium succinate (SOLU-Medrol) injection 40 mg (has no administration in time range)   dextrose (GLUTOSE) oral gel 15 g (has no administration in time range)   dextrose (D50W) (25 g/50 mL) IV injection 25 g (has no administration in time range)   glucagon (GLUCAGEN) injection 1 mg (has no administration in time range)   insulin lispro (HUMALOG/ADMELOG) injection 2-7 Units (has no administration in time range)   ipratropium-albuterol (DUO-NEB) nebulizer solution 3 mL (3 mL Nebulization Given 4/4/24 0218)   methylPREDNISolone sodium succinate (SOLU-Medrol) injection 125 mg (125 mg Intravenous Given 4/4/24 0227)   cefepime 2000 mg IVPB in 100 mL NS (MBP) (0 mg Intravenous Stopped 4/4/24 0446)         ORDERS PLACED DURING THIS VISIT:  Orders Placed This Encounter   Procedures    Respiratory Panel PCR w/COVID-19(SARS-CoV-2) [facility]/MENA/[facility] In-House, NP Swab in UTM/VTM, 2 HR TAT - Swab, Nasopharynx    Blood Culture - Blood,    Blood Culture - Blood,    Respiratory Culture - Sputum, Cough    Legionella Antigen, Urine - Urine, Urine, Clean Catch    S. Pneumo Ag Urine or CSF - Urine, Urine, Clean Catch    XR Chest 1 View    Comprehensive Metabolic Panel    Protime-INR    aPTT    BNP    High Sensitivity  Troponin T    Blood Gas, Arterial -    Magnesium    Procalcitonin    CBC Auto Differential    Lactic Acid, Plasma    High Sensitivity Troponin T 2Hr    Blood Gas, Arterial -    Basic Metabolic Panel    CBC (No Diff)    Diet: Cardiac; Healthy Heart (2-3 Na+); Fluid Consistency: Thin (IDDSI 0)    Monitor Blood Pressure    Vital Signs    Intake & Output    Weigh Patient    Nursing Dysphagia Screening (Complete Prior to Giving Anything By Mouth)    RN to Place Order SLP Consult - Eval & Treat Choosing Reason of RN Dysphagia Screen Failed    Nurse to Call MD or Nutrition Services for Diet if Patient Passes Dysphagia Screen    Place Sequential Compression Device    Maintain Sequential Compression Device    Maintain IV Access    Telemetry - Place Orders & Notify Provider of Results When Patient Experiences Acute Chest Pain, Dysrhythmia or Respiratory Distress    May Be Off Telemetry for Tests    Continuous Pulse Oximetry    Up With Assistance    Daily Weights    Neuro Checks    Neurovascular Checks    Code Status and Medical Interventions:    Pulmonology (on-call MD unless specified)    LHA (on-call MD unless specified) Details    NIPPV (CPAP or BIPAP)    Incentive Spirometry    Incentive Spirometry    Oxygen Therapy- Nasal Cannula; Titrate 1-6 LPM Per SpO2; 90 - 95%    Sputum Induction if Necessary    May Suction During Sputum Induction to Obtain Sample    POC Glucose 4x Daily Before Meals & at Bedtime    ECG 12 Lead Dyspnea    Insert Peripheral IV    Insert Peripheral IV    Inpatient Admission    CBC & Differential         PROGRESS, DATA ANALYSIS, CONSULTS, AND MEDICAL DECISION MAKING    All labs have been independently interpreted by me.  All radiology studies have been reviewed by me and discussed with radiologist dictating the report.   EKG's independently viewed and interpreted by me.  Discussion below represents my analysis of pertinent findings related to patient's condition, differential diagnosis, treatment plan  and final disposition.    My differential diagnosis for dyspnea includes but is not limited to:  Asthma, COPD, pneumonia, pulmonary embolus, acute respiratory distress syndrome, pneumothorax, pleural effusion, pulmonary fibrosis, congestive heart failure, myocardial infarction, DKA, uremia, acidosis, sepsis, anemia, drug related, hyperventilation, CNS disease      Clinical Scores:              ED Course as of 04/04/24 0635   Thu Apr 04, 2024   0250 EKG          EKG time: 0215  Rhythm/Rate: Sinus rhythm rate 89  P waves and ME: Normal  QRS, axis: LVH  ST and T waves: Nonspecific    Interpreted Contemporaneously by me, independently viewed [TJ]   0315 Reassessment: Patient is tolerating BiPAP well.  Respiratory rate has improved approximately 30 breaths/min. [TJ]   0411 Assessment patient's respiratory rate improved to 24.  Breathing much more comfortably now. [TJ]   0435 Patient again developed some fatigue and dyspnea with nasal cannula.  Placed back on BiPAP. [TJ]   0519 D/w Dr Muro.  Okay for floor. [TJ]      ED Course User Index  [TJ] Ganesh Escamilla MD         Critical care provider statement:    Critical care time (minutes): 43.   Critical care time was exclusive of:  Separately billable procedures and treating other patients   Critical care was necessary to treat or prevent imminent or life-threatening deterioration of the following conditions:  Respiratory Failure   Critical care was time spent personally by me on the following activities:  Development of treatment plan with patient or surrogate, discussions with consultants, evaluation of patient's response to treatment, examination of patient, obtaining history from patient or surrogate, ordering and performing treatments and interventions, ordering and review of laboratory studies, ordering and review of radiographic studies, pulse oximetry, re-evaluation of patient's condition and review of old charts.      PPE: The patient wore a mask and I  wore an N95 mask throughout the entire patient encounter.       AS OF 06:35 EDT VITALS:    BP - 102/68  HR - 88  TEMP - 98.2 °F (36.8 °C) (Oral)  O2 SATS - 100%        DIAGNOSIS  Final diagnoses:   COPD exacerbation   Pneumonia due to infectious organism, unspecified laterality, unspecified part of lung         DISPOSITION  ED Disposition       ED Disposition   Decision to Admit    Condition   --    Comment   Level of Care: Telemetry [5]   Diagnosis: COPD exacerbation [942818]   Admitting Physician: ANAHI KERN [[id number]]   Certification: I Certify That Inpatient Hospital Services Are Medically Necessary For Greater Than 2 Midnights                    Note Disclaimer: At [facility], we believe that sharing information builds trust and better relationships. You are receiving this note because you recently visited [facility]. It is possible you will see health information before a provider has talked with you about it. This kind of information can be easy to misunderstand. To help you fully understand what it means for your health, we urge you to discuss this note with your provider.         Ganesh Escamilla MD  04/04/24 0632

## 2024-04-05 PROBLEM — R73.03 PREDIABETES: Status: ACTIVE | Noted: 2024-04-05

## 2024-04-05 LAB
ANION GAP SERPL CALCULATED.3IONS-SCNC: 9 MMOL/L (ref 5–15)
BASOPHILS # BLD AUTO: 0.03 10*3/MM3 (ref 0–0.2)
BASOPHILS NFR BLD AUTO: 0.4 % (ref 0–1.5)
BUN SERPL-MCNC: 30 MG/DL (ref 8–23)
BUN/CREAT SERPL: 25.9 (ref 7–25)
CALCIUM SPEC-SCNC: 8.4 MG/DL (ref 8.6–10.5)
CHLORIDE SERPL-SCNC: 104 MMOL/L (ref 98–107)
CO2 SERPL-SCNC: 24 MMOL/L (ref 22–29)
CREAT SERPL-MCNC: 1.16 MG/DL (ref 0.76–1.27)
DEPRECATED RDW RBC AUTO: 37.4 FL (ref 37–54)
EGFRCR SERPLBLD CKD-EPI 2021: 64.1 ML/MIN/1.73
EOSINOPHIL # BLD AUTO: 0.03 10*3/MM3 (ref 0–0.4)
EOSINOPHIL NFR BLD AUTO: 0.4 % (ref 0.3–6.2)
ERYTHROCYTE [DISTWIDTH] IN BLOOD BY AUTOMATED COUNT: 11.4 % (ref 12.3–15.4)
GLUCOSE BLDC GLUCOMTR-MCNC: 100 MG/DL (ref 70–130)
GLUCOSE BLDC GLUCOMTR-MCNC: 115 MG/DL (ref 70–130)
GLUCOSE BLDC GLUCOMTR-MCNC: 120 MG/DL (ref 70–130)
GLUCOSE BLDC GLUCOMTR-MCNC: 85 MG/DL (ref 70–130)
GLUCOSE SERPL-MCNC: 100 MG/DL (ref 65–99)
HCT VFR BLD AUTO: 33.8 % (ref 37.5–51)
HGB BLD-MCNC: 11 G/DL (ref 13–17.7)
IMM GRANULOCYTES # BLD AUTO: 0.07 10*3/MM3 (ref 0–0.05)
IMM GRANULOCYTES NFR BLD AUTO: 0.9 % (ref 0–0.5)
LYMPHOCYTES # BLD AUTO: 1.46 10*3/MM3 (ref 0.7–3.1)
LYMPHOCYTES NFR BLD AUTO: 18 % (ref 19.6–45.3)
MCH RBC QN AUTO: 29.6 PG (ref 26.6–33)
MCHC RBC AUTO-ENTMCNC: 32.5 G/DL (ref 31.5–35.7)
MCV RBC AUTO: 90.9 FL (ref 79–97)
MONOCYTES # BLD AUTO: 0.71 10*3/MM3 (ref 0.1–0.9)
MONOCYTES NFR BLD AUTO: 8.7 % (ref 5–12)
NEUTROPHILS NFR BLD AUTO: 5.82 10*3/MM3 (ref 1.7–7)
NEUTROPHILS NFR BLD AUTO: 71.6 % (ref 42.7–76)
NRBC BLD AUTO-RTO: 0 /100 WBC (ref 0–0.2)
PLATELET # BLD AUTO: 252 10*3/MM3 (ref 140–450)
PMV BLD AUTO: 8.8 FL (ref 6–12)
POTASSIUM SERPL-SCNC: 4.1 MMOL/L (ref 3.5–5.2)
RBC # BLD AUTO: 3.72 10*6/MM3 (ref 4.14–5.8)
SODIUM SERPL-SCNC: 137 MMOL/L (ref 136–145)
WBC NRBC COR # BLD AUTO: 8.12 10*3/MM3 (ref 3.4–10.8)

## 2024-04-05 PROCEDURE — 97162 PT EVAL MOD COMPLEX 30 MIN: CPT

## 2024-04-05 PROCEDURE — 25010000002 METHYLPREDNISOLONE PER 40 MG: Performed by: INTERNAL MEDICINE

## 2024-04-05 PROCEDURE — 82948 REAGENT STRIP/BLOOD GLUCOSE: CPT

## 2024-04-05 PROCEDURE — 93005 ELECTROCARDIOGRAM TRACING: CPT | Performed by: INTERNAL MEDICINE

## 2024-04-05 PROCEDURE — 97110 THERAPEUTIC EXERCISES: CPT

## 2024-04-05 PROCEDURE — 94799 UNLISTED PULMONARY SVC/PX: CPT

## 2024-04-05 PROCEDURE — 25010000002 CEFEPIME PER 500 MG: Performed by: INTERNAL MEDICINE

## 2024-04-05 PROCEDURE — 94664 DEMO&/EVAL PT USE INHALER: CPT

## 2024-04-05 PROCEDURE — 85025 COMPLETE CBC W/AUTO DIFF WBC: CPT | Performed by: INTERNAL MEDICINE

## 2024-04-05 PROCEDURE — 93010 ELECTROCARDIOGRAM REPORT: CPT | Performed by: INTERNAL MEDICINE

## 2024-04-05 PROCEDURE — 94660 CPAP INITIATION&MGMT: CPT

## 2024-04-05 PROCEDURE — 94761 N-INVAS EAR/PLS OXIMETRY MLT: CPT

## 2024-04-05 PROCEDURE — 80048 BASIC METABOLIC PNL TOTAL CA: CPT | Performed by: INTERNAL MEDICINE

## 2024-04-05 RX ORDER — PREDNISONE 20 MG/1
40 TABLET ORAL
Status: DISCONTINUED | OUTPATIENT
Start: 2024-04-06 | End: 2024-04-07

## 2024-04-05 RX ADMIN — FAMOTIDINE 20 MG: 20 TABLET, FILM COATED ORAL at 09:30

## 2024-04-05 RX ADMIN — METOPROLOL TARTRATE 75 MG: 25 TABLET, FILM COATED ORAL at 09:30

## 2024-04-05 RX ADMIN — GUAIFENESIN 1200 MG: 600 TABLET, EXTENDED RELEASE ORAL at 22:21

## 2024-04-05 RX ADMIN — IPRATROPIUM BROMIDE AND ALBUTEROL SULFATE 3 ML: .5; 3 SOLUTION RESPIRATORY (INHALATION) at 10:22

## 2024-04-05 RX ADMIN — ATORVASTATIN CALCIUM 80 MG: 80 TABLET, FILM COATED ORAL at 09:33

## 2024-04-05 RX ADMIN — IPRATROPIUM BROMIDE AND ALBUTEROL SULFATE 3 ML: .5; 3 SOLUTION RESPIRATORY (INHALATION) at 06:59

## 2024-04-05 RX ADMIN — CEFEPIME 2000 MG: 2 INJECTION, POWDER, FOR SOLUTION INTRAVENOUS at 12:05

## 2024-04-05 RX ADMIN — SACUBITRIL AND VALSARTAN 1 TABLET: 24; 26 TABLET, FILM COATED ORAL at 09:33

## 2024-04-05 RX ADMIN — Medication 10 ML: at 22:26

## 2024-04-05 RX ADMIN — GUAIFENESIN 1200 MG: 600 TABLET, EXTENDED RELEASE ORAL at 09:33

## 2024-04-05 RX ADMIN — IPRATROPIUM BROMIDE AND ALBUTEROL SULFATE 3 ML: .5; 3 SOLUTION RESPIRATORY (INHALATION) at 15:10

## 2024-04-05 RX ADMIN — FLUTICASONE PROPIONATE 2 SPRAY: 50 SPRAY, METERED NASAL at 09:34

## 2024-04-05 RX ADMIN — CEFEPIME 2000 MG: 2 INJECTION, POWDER, FOR SOLUTION INTRAVENOUS at 22:30

## 2024-04-05 RX ADMIN — APIXABAN 5 MG: 5 TABLET, FILM COATED ORAL at 22:22

## 2024-04-05 RX ADMIN — IPRATROPIUM BROMIDE AND ALBUTEROL SULFATE 3 ML: .5; 3 SOLUTION RESPIRATORY (INHALATION) at 19:27

## 2024-04-05 RX ADMIN — FAMOTIDINE 20 MG: 20 TABLET, FILM COATED ORAL at 22:22

## 2024-04-05 RX ADMIN — ASPIRIN 81 MG: 81 TABLET, COATED ORAL at 09:33

## 2024-04-05 RX ADMIN — APIXABAN 5 MG: 5 TABLET, FILM COATED ORAL at 09:33

## 2024-04-05 RX ADMIN — Medication 10 ML: at 09:46

## 2024-04-05 RX ADMIN — METHYLPREDNISOLONE SODIUM SUCCINATE 40 MG: 40 INJECTION, POWDER, FOR SOLUTION INTRAMUSCULAR; INTRAVENOUS at 09:33

## 2024-04-05 RX ADMIN — FOLIC ACID 1 MG: 1 TABLET ORAL at 09:33

## 2024-04-05 RX ADMIN — CEFEPIME 2000 MG: 2 INJECTION, POWDER, FOR SOLUTION INTRAVENOUS at 03:36

## 2024-04-05 RX ADMIN — METOPROLOL TARTRATE 25 MG: 25 TABLET, FILM COATED ORAL at 22:25

## 2024-04-05 RX ADMIN — POLYETHYLENE GLYCOL 3350 17 G: 17 POWDER, FOR SOLUTION ORAL at 09:34

## 2024-04-05 RX ADMIN — SACUBITRIL AND VALSARTAN 1 TABLET: 24; 26 TABLET, FILM COATED ORAL at 22:25

## 2024-04-05 NOTE — PLAN OF CARE
Goal Outcome Evaluation:  Plan of Care Reviewed With: patient        Progress: improving  Outcome Evaluation: VSS, though BPs a little soft this shift. 90s/50s. Metoprolol held per order parameters. MD notified. No c/o pain, soa, N/V. BiPAP on all shift. Desat into 80s at hs, RT bumped concentration up from 30 to 40%. Dry cough. Still needing sputum sample. Pulm to see today. IV cefepime continued. Blood sugar at hs of 121. No ssi given. Voiding via urinal. No BM this shift.

## 2024-04-05 NOTE — THERAPY EVALUATION
Patient Name: Wisam Malone  : 1945    MRN: [id number]                              Today's Date: 2024       Admit Date: 2024    Visit Dx:     ICD-10-CM ICD-9-CM   1. COPD exacerbation  J44.1 491.21   2. Pneumonia due to infectious organism, unspecified laterality, unspecified part of lung  J18.9 486     Patient Active Problem List   Diagnosis    COPD exacerbation    Atherosclerotic coronary vascular disease    Hypertension    Weight loss    Ischemic cardiomyopathy    Paroxysmal atrial fibrillation    LV (left ventricular) mural thrombus    Chronic respiratory failure with hypoxia and hypercapnia    Left lower lobe pneumonia     Past Medical History:   Diagnosis Date    COPD (chronic obstructive pulmonary disease)     Elevated cholesterol     GERD (gastroesophageal reflux disease)     Hypertension      Past Surgical History:   Procedure Laterality Date    CORONARY ARTERY BYPASS GRAFT        General Information       Row Name 24 1021          Physical Therapy Time and Intention    Document Type evaluation  -DB (r) HB (t) DB (c)     Mode of Treatment individual therapy;physical therapy  -DB (r) HB (t) DB (c)       Row Name 24 1021          General Information    Patient Profile Reviewed yes  -DB (r) HB (t) DB (c)     Prior Level of Function min assist:  uses RW and bipap at BL, requiers assist from family with mobility  -DB (r) HB (t) DB (c)     Existing Precautions/Restrictions fall;oxygen therapy device and L/min  -DB (r) HB (t) DB (c)     Barriers to Rehab previous functional deficit  -DB (r) HB (t) DB (c)       Row Name 24 1021          Living Environment    People in Home spouse;child(maria m), adult  -DB (r) HB (t) DB (c)       Row Name 24 1021          Home Main Entrance    Number of Stairs, Main Entrance two  -DB (r) HB (t) DB (c)     Stair Railings, Main Entrance railings safe and in good condition  -DB (r) HB (t) DB (c)       Row Name 24 1021          Stairs  Within Home, Primary    Number of Stairs, Within Home, Primary none  -DB (r) HB (t) DB (c)       Row Name 04/05/24 1021          Cognition    Orientation Status (Cognition) oriented x 4  -DB (r) HB (t) DB (c)       Row Name 04/05/24 1021          Safety Issues, Functional Mobility    Impairments Affecting Function (Mobility) endurance/activity tolerance;shortness of breath;strength  -DB (r) HB (t) DB (c)               User Key  (r) = Recorded By, (t) = Taken By, (c) = Cosigned By      Initials Name Provider Type    DB Alondra Olguin, PT Physical Therapist    HB Loree Dos Santos, PT Student PT Student                   Mobility       Row Name 04/05/24 1023          Bed Mobility    Bed Mobility supine-sit;sit-supine;scooting/bridging  -DB (r) HB (t) DB (c)     Scooting/Bridging [place] (Bed Mobility) standby assist  -DB (r) HB (t) DB (c)     Supine-Sit [place] (Bed Mobility) standby assist  -DB (r) HB (t) DB (c)     Sit-Supine [place] (Bed Mobility) standby assist  -DB (r) HB (t) DB (c)     Assistive Device (Bed Mobility) head of bed elevated  -DB (r) HB (t) DB (c)     Comment, (Bed Mobility) Pt requests HOB to remain elevated, reports increased SOA with bed flat  -DB (r) HB (t) DB (c)       Row Name 04/05/24 1023          Bed-Chair Transfer    Bed-Chair [place] (Transfers) not tested  -DB (r) HB (t) DB (c)       Row Name 04/05/24 1023          Sit-Stand Transfer    Sit-Stand [place] (Transfers) contact guard  -DB (r) HB (t) DB (c)     Assistive Device (Sit-Stand Transfers) walker, 4-wheeled  -DB (r) HB (t) DB (c)     Comment, (Sit-Stand Transfer) STS x1 from EOB, no LOB or unsteadiness  -DB (r) HB (t) DB (c)       Row Name 04/05/24 1023          Gait/Stairs (Locomotion)    [place] Level (Gait) not tested  -DB (r) HB (t) DB (c)     [place] Level (Stairs) not tested  -DB (r) HB (t) DB (c)     Comment, (Gait/Stairs) Gait NT - pt limited by SOA with static standing  -DB (r) HB (t) DB (c)                User Key  (r) = Recorded By, (t) = Taken By, (c) = Cosigned By      Initials Name Provider Type    DB Alondra Olguin, PT Physical Therapist    Loree Boyle, PT Student PT Student                   Obj/Interventions       Row Name 04/05/24 1024          Range of Motion Comprehensive    General Range of Motion no range of motion deficits identified  -DB (r) HB (t) DB (c)       Row Name 04/05/24 1024          Strength Comprehensive (MMT)    General Manual Muscle Testing (MMT) Assessment lower extremity strength deficits identified  -DB (r) HB (t) DB (c)     Comment, General Manual Muscle Testing (MMT) Assessment BLE grossly 3+/5  -DB (r) HB (t) DB (c)       Row Name 04/05/24 1024          Motor Skills    Therapeutic Exercise other (see comments)  standing MIP x8 B; seated MIP, LAQ, AP, GS x10 B  -DB (r) HB (t) DB (c)       Row Name 04/05/24 1024          Balance    Balance Assessment sitting static balance;sitting dynamic balance;standing static balance;standing dynamic balance  -DB (r) HB (t) DB (c)     Static Sitting Balance supervision  -DB (r) HB (t) DB (c)     Dynamic Sitting Balance standby assist  -DB (r) HB (t) DB (c)     Position, Sitting Balance unsupported;sitting edge of bed  -DB (r) HB (t) DB (c)     Static Standing Balance standby assist  -DB (r) HB (t) DB (c)     Dynamic Standing Balance contact guard  -DB (r) HB (t) DB (c)     Position/Device Used, Standing Balance supported;walker, front-wheeled  -DB (r) HB (t) DB (c)     Balance Interventions sitting;standing;sit to stand;weight shifting activity  -DB (r) HB (t) DB (c)     Comment, Balance No LOB or unsteadiness noted, seated EOB x12 min, static standing and MIP, limited by SOA  -DB (r) HB (t) DB (c)               User Key  (r) = Recorded By, (t) = Taken By, (c) = Cosigned By      Initials Name Provider Type    DB Alondra Olguin, PT Physical Therapist    Loree Boyle, PT Student PT Student                   Goals/Plan       [facility] [place] 04/05/24 1036          Bed Mobility Goal 1 (PT)    Activity/Assistive Device (Bed Mobility Goal 1, PT) bed mobility activities, all  -DB (r) HB (t) DB (c)     Hand Level/Cues Needed (Bed Mobility Goal 1, PT) modified independence  -DB (r) HB (t) DB (c)     Time Frame (Bed Mobility Goal 1, PT) 1 week  -DB (r) HB (t) DB (c)       Row Name 04/05/24 1036          Transfer Goal 1 (PT)    Activity/Assistive Device (Transfer Goal 1, PT) sit-to-stand/stand-to-sit  -DB (r) HB (t) DB (c)     Hand Level/Cues Needed (Transfer Goal 1, PT) supervision required  -DB (r) HB (t) DB (c)     Time Frame (Transfer Goal 1, PT) 1 week  -DB (r) HB (t) DB (c)       Row Name 04/05/24 1036          Gait Training Goal 1 (PT)    Activity/Assistive Device (Gait Training Goal 1, PT) gait (walking locomotion)  -DB (r) HB (t) DB (c)     Hand Level (Gait Training Goal 1, PT) minimum assist (75% or more patient effort)  -DB (r) HB (t) DB (c)     Distance (Gait Training Goal 1, PT) Pt to ambulate x30 ft with RW  -DB (r) HB (t) DB (c)     Time Frame (Gait Training Goal 1, PT) 1 week  -DB (r) HB (t) DB (c)       Row Name 04/05/24 1036          Therapy Assessment/Plan (PT)    Planned Therapy Interventions (PT) balance training;bed mobility training;gait training;home exercise program;patient/family education;stretching;strengthening;stair training;transfer training  -DB (r) HB (t) DB (c)               User Key  (r) = Recorded By, (t) = Taken By, (c) = Cosigned By      Initials Name Provider Type    Alondra Dhaliwal, PT Physical Therapist    Loree Boyle, PT Student PT Student                   Clinical Impression       Row Name 04/05/24 1026          Pain    Pretreatment Pain Rating 0/10 - no pain  -DB (r) HB (t) DB (c)     Posttreatment Pain Rating 0/10 - no pain  -DB (r) HB (t) DB (c)       Row Name 04/05/24 1026          Plan of Care Review    Plan of Care Reviewed With patient  -DB (r) HB (t) DB (c)     Outcome  Evaluation Pt is a 77 y/o M presenting with SOA and increased RR. Further workup reveals PNA and COPD exacerbation. PMH includes A-fib, HTN, COPD, and CAD. Pt with recent admission 3/8-3/10 for similar symptoms. Pt greeted in supine, agreeable to PT evaluation this AM. A&O x4, reports PLOF as living in single-story home with spouse and adult children, has 2 SHRAVAN with HR, uses RW, requires assist from family with upright mobility, and wears bi-pap device for O2 needs all of the time. States he will remove bi-pap when eating meals, going to the bathroom, and when walking around the house. This date, pt presents below BL status, demonstrating impaired endurance and strength. Removed bi-pap while in bed, SpO2 reading 100%. He completes sup to/from sit with SBA and  STS x1 to RW with CGA. Pt declines ambulating in room this date due to increased SOA with STS. Completes standing MIP, but unable to complete x10 reps bilaterally due to fatigue, weakness, and SOA. SpO2 desatting to 88% upon sitting, placed pt back on bi-pap to recover. After completing seated LE therex, returned pt to bed. Left in supine with needs met. PT will continue to follow to address functional deficits. Recommending d/c home with assist and HH services for follow-up.  -DB (r) HB (t) DB (c)       Row Name 04/05/24 1026          Therapy Assessment/Plan (PT)    Criteria for Skilled Interventions Met (PT) yes;skilled treatment is necessary  -DB (r) HB (t) DB (c)     Therapy Frequency (PT) 5 times/wk  -DB (r) HB (t) DB (c)       Row Name 04/05/24 1026          Vital Signs    Pre SpO2 (%) 100  -DB (r) HB (t) DB (c)     O2 Delivery Pre Treatment supplemental O2  bi-pap  -DB (r) HB (t) DB (c)     Intra SpO2 (%) 88  -DB (r) HB (t) DB (c)     O2 Delivery Intra Treatment room air  -DB (r) HB (t) DB (c)     Post SpO2 (%) 97  -DB (r) HB (t) DB (c)     O2 Delivery Post Treatment supplemental O2  bi-pap  -DB (r) HB (t) DB (c)     Pre Patient Position Supine  -DB (r)  HB (t) DB (c)     Intra Patient Position Standing  -DB (r) HB (t) DB (c)     Post Patient Position Supine  -DB (r) HB (t) DB (c)       Row Name 04/05/24 1026          Positioning and Restraints    Pre-Treatment Position in bed  -DB (r) HB (t) DB (c)     Post Treatment Position bed  -DB (r) HB (t) DB (c)     In Bed supine;encouraged to call for assist;exit alarm on;call light within reach;SCD pump applied;LUE elevated  -DB (r) HB (t) DB (c)               User Key  (r) = Recorded By, (t) = Taken By, (c) = Cosigned By      Initials Name Provider Type    Alondra Dhaliwal, PT Physical Therapist    Loree Boyle, PT Student PT Student                   Outcome Measures       Row Name 04/05/24 1037 04/05/24 0929       How much help from another person do you currently need...    Turning from your back to your side while in flat bed without using bedrails? 4  -DB (r) HB (t) DB (c) 4  -ES    Moving from lying on back to sitting on the side of a flat bed without bedrails? 4  -DB (r) HB (t) DB (c) 4  -ES    Moving to and from a bed to a chair (including a wheelchair)? 2  -DB (r) HB (t) DB (c) 3  -ES    Standing up from a chair using your arms (e.g., wheelchair, bedside chair)? 3  -DB (r) HB (t) DB (c) 3  -ES    Climbing 3-5 steps with a railing? 2  -DB (r) HB (t) DB (c) 2  -ES    To walk in hospital room? 2  -DB (r) HB (t) DB (c) 2  -ES    AM-PAC 6 Clicks Score (PT) 17  -DB (r) HB (t) 18  -ES    Highest Level of Mobility Goal 5 --> Static standing  -DB (r) HB (t) 6 --> Walk 10 steps or more  -ES      Row Name 04/05/24 1037          Functional Assessment    Outcome Measure Options AM-PAC 6 Clicks Basic Mobility (PT)  -DB (r) HB (t) DB (c)               User Key  (r) = Recorded By, (t) = Taken By, (c) = Cosigned By      Initials Name Provider Type    Alondra Dhaliwal, PT Physical Therapist    Zainab Cisneros, RN Registered Nurse    Loree Boyle, PT Student PT Student                                 Physical  Therapy Education       Title: PT OT SLP Therapies (Done)       Topic: Physical Therapy (Done)       Point: Mobility training (Done)       Learning Progress Summary             Patient Acceptance, E, VU,NR by  at 4/5/2024 1038                         Point: Home exercise program (Done)       Learning Progress Summary             Patient Acceptance, E, VU,NR by  at 4/5/2024 1038                         Point: Body mechanics (Done)       Learning Progress Summary             Patient Acceptance, E, VU,NR by  at 4/5/2024 1038                         Point: Precautions (Done)       Learning Progress Summary             Patient Acceptance, E, VU,NR by  at 4/5/2024 1038                                         User Key       Initials Effective Dates Name Provider Type Discipline     02/21/24 -  Loree Dos Santos, PT Student PT Student PT                  PT Recommendation and Plan  Planned Therapy Interventions (PT): balance training, bed mobility training, gait training, home exercise program, patient/family education, stretching, strengthening, stair training, transfer training  Plan of Care Reviewed With: patient  Outcome Evaluation: Pt is a 77 y/o M presenting with SOA and increased RR. Further workup reveals PNA and COPD exacerbation. PMH includes A-fib, HTN, COPD, and CAD. Pt with recent admission 3/8-3/10 for similar symptoms. Pt greeted in supine, agreeable to PT evaluation this AM. A&O x4, reports PLOF as living in single-story home with spouse and adult children, has 2 SHRAVAN with HR, uses RW, requires assist from family with upright mobility, and wears bi-pap device for O2 needs all of the time. States he will remove bi-pap when eating meals, going to the bathroom, and when walking around the house. This date, pt presents below BL status, demonstrating impaired endurance and strength. Removed bi-pap while in bed, SpO2 reading 100%. He completes sup to/from sit with SBA and  STS x1 to RW with CGA. Pt declines  ambulating in room this date due to increased SOA with STS. Completes standing MIP, but unable to complete x10 reps bilaterally due to fatigue, weakness, and SOA. SpO2 desatting to 88% upon sitting, placed pt back on bi-pap to recover. After completing seated LE therex, returned pt to bed. Left in supine with needs met. PT will continue to follow to address functional deficits. Recommending d/c home with assist and  services for follow-up.     Time Calculation:         PT Charges       Row Name 04/05/24 1038             Time Calculation    Start Time 0953  -DB (r) HB (t) DB (c)      Stop Time 1014  -DB (r) HB (t) DB (c)      Time Calculation (min) 21 min  -DB (r) HB (t)      PT Received On 04/05/24  -DB (r) HB (t) DB (c)      PT - Next Appointment 04/08/24  -DB (r) HB (t) DB (c)      PT Goal Re-Cert Due Date 04/12/24  -DB (r) HB (t) DB (c)         Time Calculation- PT    Total Timed Code Minutes- PT 12 minute(s)  -DB (r) HB (t) DB (c)         Timed Charges    43689 - PT Therapeutic Exercise Minutes 8  -DB (r) HB (t) DB (c)      97723 - PT Therapeutic Activity Minutes 4  -DB (r) HB (t) DB (c)         Total Minutes    Timed Charges Total Minutes 12  -DB (r) HB (t)       Total Minutes 12  -DB (r) HB (t)                User Key  (r) = Recorded By, (t) = Taken By, (c) = Cosigned By      Initials Name Provider Type    Alondra Dhaliwal, PT Physical Therapist    Loree Boyle, PT Student PT Student                  Therapy Charges for Today       Code Description Service Date Service Provider Modifiers Qty    29593993814 HC PT THER PROC EA 15 MIN 4/5/2024 Loree Dos Santos, PT Student GP 1    29333348342 HC PT EVAL MOD COMPLEXITY 2 4/5/2024 Loree Dos Santos, PT Student GP 1            PT G-Codes  Outcome Measure Options: AM-PAC 6 Clicks Basic Mobility (PT)  AM-PAC 6 Clicks Score (PT): 17  PT Discharge Summary  Anticipated Discharge Disposition (PT): home with assist, home with home health    Loree Dos Santos, PT  Student  4/5/2024

## 2024-04-05 NOTE — PROGRESS NOTES
Name: Wisam Malone ADMIT: 2024   : 1945  PCP: System, Provider Not In    MRN: [id number] LOS: 1 days   AGE/SEX: 78 y.o. male  ROOM: E4/     Subjective   Subjective   Patient reports mild improvement of the shortness of breath but not back to baseline.  Positive cough productive of yellowish sputum.  No chest pain.  No hemoptysis.  No wheezing.  No fever or chills.  No palpitation    Review of Systems  GI.  No abdominal pain.  No nausea or vomiting.  No choking with the food or the liquids  .  No dysuria or hematuria.     Objective   Objective   Vital Signs  Temp:  [97.3 °F (36.3 °C)-98.2 °F (36.8 °C)] 97.7 °F (36.5 °C)  Heart Rate:  [79-99] 79  Resp:  [18-29] 26  BP: (86-99)/(46-65) 99/46  SpO2:  [98 %-100 %] 100 %  on  Flow (L/min):  [2] 2;   Device (Oxygen Therapy): NPPV/NIV    Intake/Output Summary (Last 24 hours) at 2024 1444  Last data filed at 2024 1440  Gross per 24 hour   Intake 600 ml   Output 400 ml   Net 200 ml     Body mass index is 20.51 kg/m².      24  0208 24  0550 24  0500   Weight: 68 kg (150 lb) 68.7 kg (151 lb 7.3 oz) 68.6 kg (151 lb 3.8 oz)     Physical Exam  General.  Elderly gentleman.  He is alert and oriented x 4.  No respiratory distress.  Normal mood and affect.  No diaphoresis.  Currently on NIPPV  Eyes.  Pupils equal round and reactive.  Intact extraocular musculature.  No pallor or jaundice.  Oral cavity.  Moist mucous membrane.  Neck.  Supple.  No JVD.  No lymphadenopathy or thyromegaly.  Cardiovascular.  Controlled rate atrial fibrillation with grade 2 systolic murmur.  Chest.  Poor bilateral air entry with no added sounds abdomen.  Soft lax.  No tenderness.  No organomegaly.  No guarding or rebound  Extremities.  Trace bilateral lower extremity edema.  No clubbing or cyanosis.  CNS.  No acute focal neurological deficits.    Results Review:      Results from last 7 days   Lab Units 24  0442 24  0918 24  0213   SODIUM  "mmol/L 137 133* 133*   POTASSIUM mmol/L 4.1 4.7 4.5   CHLORIDE mmol/L 104 100 97*   CO2 mmol/L 24.0 24.0 26.7   BUN mg/dL 30* 18 16   CREATININE mg/dL 1.16 0.80 0.95   GLUCOSE mg/dL 100* 211* 143*   CALCIUM mg/dL 8.4* 8.4* 9.0   AST (SGOT) U/L  --   --  23   ALT (SGPT) U/L  --   --  40     Estimated Creatinine Clearance: 50.1 mL/min (by C-G formula based on SCr of 1.16 mg/dL).  Results from last 7 days   Lab Units 04/04/24  1526   HEMOGLOBIN A1C % 5.90*     Results from last 7 days   Lab Units 04/05/24  1131 04/05/24  0716 04/04/24  2053 04/04/24  1651 04/04/24  1135 04/04/24  0723   GLUCOSE mg/dL 85 100 121 158* 163* 131*     Results from last 7 days   Lab Units 04/04/24  0328 04/04/24  0213   HSTROP T ng/L 38* 33*     Results from last 7 days   Lab Units 04/04/24  0213   PROBNP pg/mL 1,065.0         Results from last 7 days   Lab Units 04/04/24  0213   MAGNESIUM mg/dL 2.0           Invalid input(s): \"LDLCALC\"  Results from last 7 days   Lab Units 04/05/24  0442 04/04/24  0918 04/04/24  0213   WBC 10*3/mm3 8.12 5.97 9.62   HEMOGLOBIN g/dL 11.0* 12.5* 13.3   HEMATOCRIT % 33.8* 38.4 41.6   PLATELETS 10*3/mm3 252 283 332   MCV fL 90.9 92.3 91.0   MCH pg 29.6 30.0 29.1   MCHC g/dL 32.5 32.6 32.0   RDW % 11.4* 11.4* 11.3*   RDW-SD fl 37.4 39.2 37.6   MPV fL 8.8 9.0 8.7   NEUTROPHIL % % 71.6  --  76.8*   LYMPHOCYTE % % 18.0*  --  13.5*   MONOCYTES % % 8.7  --  7.5   EOSINOPHIL % % 0.4  --  0.8   BASOPHIL % % 0.4  --  0.6   IMM GRAN % % 0.9*  --  0.8*   NEUTROS ABS 10*3/mm3 5.82  --  7.38*   LYMPHS ABS 10*3/mm3 1.46  --  1.30   MONOS ABS 10*3/mm3 0.71  --  0.72   EOS ABS 10*3/mm3 0.03  --  0.08   BASOS ABS 10*3/mm3 0.03  --  0.06   IMMATURE GRANS (ABS) 10*3/mm3 0.07*  --  0.08*   NRBC /100 WBC 0.0  --  0.0     Results from last 7 days   Lab Units 04/04/24  0213   INR  1.09   APTT seconds 33.4     Results from last 7 days   Lab Units 04/04/24  0214   PH, ARTERIAL pH units 7.372   PO2 ART mm Hg 57.7*   PCO2, ARTERIAL mm Hg " 45.2*   HCO3 ART mmol/L 26.3     Results from last 7 days   Lab Units 04/04/24  0328 04/04/24  0213   PROCALCITONIN ng/mL  --  0.05   LACTATE mmol/L 1.1  --              Results from last 7 days   Lab Units 04/04/24  0328   BLOODCX  No growth at 24 hours  No growth at 24 hours     Results from last 7 days   Lab Units 04/04/24  0214   ADENOVIRUS DETECTION BY PCR  Not Detected   CORONAVIRUS 229E  Not Detected   CORONAVIRUS HKU1  Not Detected   CORONAVIRUS NL63  Not Detected   CORONAVIRUS OC43  Not Detected   HUMAN METAPNEUMOVIRUS  Not Detected   HUMAN RHINOVIRUS/ENTEROVIRUS  Not Detected   INFLUENZA B PCR  Not Detected   PARAINFLUENZA 1  Not Detected   PARAINFLUENZA VIRUS 2  Not Detected   PARAINFLUENZA VIRUS 3  Not Detected   PARAINFLUENZA VIRUS 4  Not Detected   BORDETELLA PERTUSSIS PCR  Not Detected   CHLAMYDOPHILA PNEUMONIAE PCR  Not Detected   MYCOPLAMA PNEUMO PCR  Not Detected   INFLUENZA A PCR  Not Detected   RSV, PCR  Not Detected               Imaging:  Imaging Results (Last 24 Hours)       ** No results found for the last 24 hours. **               I reviewed the patient's new clinical results / labs / tests / procedures      Assessment/Plan     Active Hospital Problems    Diagnosis  POA    **COPD exacerbation [J44.1]  Yes    Prediabetes [R73.03]  Yes    Chronic respiratory failure with hypoxia and hypercapnia [J96.11, J96.12]  Yes    Left lower lobe pneumonia [J18.9]  Yes    Paroxysmal atrial fibrillation [I48.0]  Yes    Ischemic cardiomyopathy [I25.5]  Yes    Hypertension [I10]  Yes    Atherosclerotic coronary vascular disease [I25.10]  Yes      Resolved Hospital Problems   No resolved problems to display.       1.  Acute on chronic hypoxemic, hypercapnic respiratory failure secondary to left lower lobe pneumonia and COPD exacerbation in a patient with a history of COPD and chronic hypoxemic, hypercapnic respiratory failure on trilogy.  Slowly improving on NIPPV/IV  steroids/DuoNebs/cefepime/Symbicort/Mucinex/I-S.  Lactic acid is normal.  Respiratory PCR and is negative.  Urine Legionella and Streptococcus antigen are negative.  Blood cultures are negative.  Awaiting sputum cultures.  Pulmonary on board.  Switched to p.o. steroids.    2.  History of coronary artery disease/hypertension/A-fib/cardiomyopathy/left ventricular thrombus.  Patient last echo on 11/23 revealing ejection fraction of 36 to 40% and left ventricular thrombus with grade 1 diastolic dysfunction.  Blood pressure is on the soft side.  Will continue aspirin/Eliquis/Lipitor/Lopressor/Entresto.  Will decrease Lopressor.  There is no clinical evidence of angina or congestive heart failure.  Troponin elevated but no acute EKG changes.  proBNP is normal.  3.  GERD.  Continue Pepcid.  Benign GI examination  4.  Dyslipidemia.  Continue lipid lowering.  5.  Prediabetes.  A1c is 5.9.  Diet at discharge.  Continue sliding scale insulin while in the hospital and on steroids.  6.  Anemia of chronic disease.  Hemoglobin is stable at baseline between 11.8 and 14.  Will monitor  7.  VTE prophylaxis.  Patient anticoagulated.     Discussed my findings and plan of treatment with the patient/family/nurses at multidisciplinary rounds  Disposition.  To be determined based on clinical course.          Maryanne Dover MD  [facility]  04/05/24  14:44 EDT

## 2024-04-05 NOTE — PLAN OF CARE
Goal Outcome Evaluation:  Plan of Care Reviewed With: patient           Outcome Evaluation: Pt is a 79 y/o M presenting with SOA and increased RR. Further workup reveals PNA and COPD exacerbation. PMH includes A-fib, HTN, COPD, and CAD. Pt with recent admission 3/8-3/10 for similar symptoms. Pt greeted in supine, agreeable to PT evaluation this AM. A&O x4, reports PLOF as living in single-story home with spouse and adult children, has 2 SHRAVAN with HR, uses RW, requires assist from family with upright mobility, and wears bi-pap device for O2 needs all of the time. States he will remove bi-pap when eating meals, going to the bathroom, and when walking around the house. This date, pt presents below BL status, demonstrating impaired endurance and strength. Removed bi-pap while in bed, SpO2 reading 100%. He completes sup to/from sit with SBA and  STS x1 to RW with CGA. Pt declines ambulating in room this date due to increased SOA with STS. Completes standing MIP, but unable to complete x10 reps bilaterally due to fatigue, weakness, and SOA. SpO2 desatting to 88% upon sitting, placed pt back on bi-pap to recover. After completing seated LE therex, returned pt to bed. Left in supine with needs met. PT will continue to follow to address functional deficits. Recommending d/c home with assist and HH services for follow-up.      Anticipated Discharge Disposition (PT): home with assist, home with home health

## 2024-04-05 NOTE — PROGRESS NOTES
"                                              LOS: 1 day   Patient Care Team:  System, Provider Not In as PCP - General    Chief Complaint:  F/up respiratory failure, COPD and medical problems as below.    Subjective   Interval History    On NIPPV most of the time.  FiO2 30%.  He reported dyspnea but improved since admission.  He has mild cough    REVIEW OF SYSTEMS:   CARDIOVASCULAR: No chest pain, chest pressure or chest discomfort. No palpitations but edema.   GASTROINTESTINAL: No anorexia, nausea, vomiting or diarrhea. No abdominal pain.  CONSTITUTIONAL: No fever or chills.     Ventilator/Non-Invasive Ventilation Settings (From admission, onward)       Start     Ordered    04/04/24 0239  NIPPV (CPAP or BIPAP)  Until Discontinued        Question Answer Comment   Indication Acute Respiratory Failure    Type AVAPS/PC/PS    Backup Rate 20    Target VT (mL) 550    EPAP/PEEP (cm H2O) 25    Min Pressure (cm H2O) 15    Max Pressure (cm H2O) 6    Titrate Oxygen for SpO2 88 - 92%        04/04/24 0239                          Physical Exam:     Vital Signs  Temp:  [97.3 °F (36.3 °C)-98.2 °F (36.8 °C)] 97.7 °F (36.5 °C)  Heart Rate:  [79-99] 79  Resp:  [18-29] 26  BP: (86-99)/(46-65) 99/46    Intake/Output Summary (Last 24 hours) at 4/5/2024 1435  Last data filed at 4/5/2024 1007  Gross per 24 hour   Intake 360 ml   Output 400 ml   Net -40 ml     Flowsheet Rows      Flowsheet Row First Filed Value   Admission Height 182.9 cm (72\") Documented at 04/04/2024 0208   Admission Weight 68 kg (150 lb) Documented at 04/04/2024 0208            PPE used per hospital policy    General Appearance:   Alert, cooperative, in no acute distress   ENMT: Mouth not examined due to presence of mask.  External ears normal.   Eyes:  Pupils equal and reactive to light. EOMI   Neck:    Trachea midline. No thyromegaly.   Lungs:   Equal but diminished air entry throughout.  No audible crackles or wheezing.    Heart:   Regular rhythm and normal rate, " normal S1 and S2, no         murmur   Skin:   No rash or ecchymosis   Abdomen:     Soft. No tenderness. No HSM.   Neuro/psych:  Conscious, alert, oriented x3. Strength 5/5 in upper and lower  ext.  Appropriate mood and affect   Extremities:  No cyanosis, clubbing but trace edema.  Warm extremities and well-perfused          Results Review:        Results from last 7 days   Lab Units 04/05/24  0442 04/04/24  0918 04/04/24 0213   SODIUM mmol/L 137 133* 133*   POTASSIUM mmol/L 4.1 4.7 4.5   CHLORIDE mmol/L 104 100 97*   CO2 mmol/L 24.0 24.0 26.7   BUN mg/dL 30* 18 16   CREATININE mg/dL 1.16 0.80 0.95   GLUCOSE mg/dL 100* 211* 143*   CALCIUM mg/dL 8.4* 8.4* 9.0     Results from last 7 days   Lab Units 04/04/24  0328 04/04/24  0213   HSTROP T ng/L 38* 33*     Results from last 7 days   Lab Units 04/05/24  0442 04/04/24  0918 04/04/24  0213   WBC 10*3/mm3 8.12 5.97 9.62   HEMOGLOBIN g/dL 11.0* 12.5* 13.3   HEMATOCRIT % 33.8* 38.4 41.6   PLATELETS 10*3/mm3 252 283 332     Results from last 7 days   Lab Units 04/04/24  0213   INR  1.09   APTT seconds 33.4     Results from last 7 days   Lab Units 04/04/24  0213   PROBNP pg/mL 1,065.0                   Results from last 7 days   Lab Units 04/04/24  0214   PH, ARTERIAL pH units 7.372   PCO2, ARTERIAL mm Hg 45.2*   PO2 ART mm Hg 57.7*   O2 SATURATION ART % 88.6*   FLOW RATE lpm 4.0000   MODALITY  Cannula         I reviewed the patient's new clinical results.        Medication Review:   apixaban, 5 mg, Oral, Q12H  aspirin, 81 mg, Oral, Daily  atorvastatin, 80 mg, Oral, Daily  budesonide-formoterol, 2 puff, Inhalation, BID - RT  cefepime, 2,000 mg, Intravenous, Q12H  famotidine, 20 mg, Oral, BID  fluticasone, 2 spray, Nasal, Daily  folic acid, 1 mg, Oral, Daily  guaiFENesin, 1,200 mg, Oral, BID  insulin lispro, 2-7 Units, Subcutaneous, 4x Daily AC & at Bedtime  ipratropium-albuterol, 3 mL, Nebulization, 4x Daily - RT  methylPREDNISolone sodium succinate, 40 mg, Intravenous,  Daily  metoprolol tartrate, 75 mg, Oral, BID  polyethylene glycol, 17 g, Oral, Daily  sacubitril-valsartan, 1 tablet, Oral, BID  sodium chloride, 10 mL, Intravenous, Q12H              Assessment   COPD exacerbation  LLL opacity, possible pneumonia  Chronic hypercapnic respiratory failure, on trilogy ventilator  Mild anemia        Plan       NIPPV: Settings reviewed and adjusted.  AVAPS PS, .  FiO2 30%.  Seems like is requiring at most of the time.  He said that he uses his machine at home almost all the time.  Asked to bring his machine so we could evaluate and make sure he is on NIPPV and adjust PAP but family are concerned that it gets lost.  [facility]  Change Solu-Medrol to prednisone 40 mg daily  Cefepime for possible pneumonia.  Urine strep and Legionella antigen negative.  Could probably treat for a short course around 5 days and then will require repeat CXR imaging in 1 month to ensure resolution.  DuoNeb 4 times a day and Symbicort 2 puffs twice daily  VTE prophylaxis: Segun Marquez MD  04/05/24  14:35 EDT          This note was dictated utilizing Dragon dictation

## 2024-04-05 NOTE — PLAN OF CARE
Goal Outcome Evaluation: Pt resting in bed with no signs of distress noted. Pt remains on bi-pap and tolerating well. Bed in lowest position with siderails up x2. Call light within reach.

## 2024-04-06 LAB
ANION GAP SERPL CALCULATED.3IONS-SCNC: 9.3 MMOL/L (ref 5–15)
BASOPHILS # BLD AUTO: 0.09 10*3/MM3 (ref 0–0.2)
BASOPHILS NFR BLD AUTO: 1 % (ref 0–1.5)
BUN SERPL-MCNC: 27 MG/DL (ref 8–23)
BUN/CREAT SERPL: 31 (ref 7–25)
CALCIUM SPEC-SCNC: 8.9 MG/DL (ref 8.6–10.5)
CHLORIDE SERPL-SCNC: 102 MMOL/L (ref 98–107)
CO2 SERPL-SCNC: 24.7 MMOL/L (ref 22–29)
CREAT SERPL-MCNC: 0.87 MG/DL (ref 0.76–1.27)
DEPRECATED RDW RBC AUTO: 38.6 FL (ref 37–54)
EGFRCR SERPLBLD CKD-EPI 2021: 87.8 ML/MIN/1.73
EOSINOPHIL # BLD AUTO: 0.15 10*3/MM3 (ref 0–0.4)
EOSINOPHIL NFR BLD AUTO: 1.7 % (ref 0.3–6.2)
ERYTHROCYTE [DISTWIDTH] IN BLOOD BY AUTOMATED COUNT: 11.6 % (ref 12.3–15.4)
GLUCOSE BLDC GLUCOMTR-MCNC: 111 MG/DL (ref 70–130)
GLUCOSE BLDC GLUCOMTR-MCNC: 112 MG/DL (ref 70–130)
GLUCOSE BLDC GLUCOMTR-MCNC: 183 MG/DL (ref 70–130)
GLUCOSE BLDC GLUCOMTR-MCNC: 198 MG/DL (ref 70–130)
GLUCOSE SERPL-MCNC: 88 MG/DL (ref 65–99)
HCT VFR BLD AUTO: 40.1 % (ref 37.5–51)
HGB BLD-MCNC: 13.1 G/DL (ref 13–17.7)
IMM GRANULOCYTES # BLD AUTO: 0.07 10*3/MM3 (ref 0–0.05)
IMM GRANULOCYTES NFR BLD AUTO: 0.8 % (ref 0–0.5)
LYMPHOCYTES # BLD AUTO: 1.5 10*3/MM3 (ref 0.7–3.1)
LYMPHOCYTES NFR BLD AUTO: 16.8 % (ref 19.6–45.3)
MAGNESIUM SERPL-MCNC: 2 MG/DL (ref 1.6–2.4)
MCH RBC QN AUTO: 29.6 PG (ref 26.6–33)
MCHC RBC AUTO-ENTMCNC: 32.7 G/DL (ref 31.5–35.7)
MCV RBC AUTO: 90.7 FL (ref 79–97)
MONOCYTES # BLD AUTO: 0.67 10*3/MM3 (ref 0.1–0.9)
MONOCYTES NFR BLD AUTO: 7.5 % (ref 5–12)
NEUTROPHILS NFR BLD AUTO: 6.47 10*3/MM3 (ref 1.7–7)
NEUTROPHILS NFR BLD AUTO: 72.2 % (ref 42.7–76)
NRBC BLD AUTO-RTO: 0 /100 WBC (ref 0–0.2)
PLATELET # BLD AUTO: 261 10*3/MM3 (ref 140–450)
PMV BLD AUTO: 8.9 FL (ref 6–12)
POTASSIUM SERPL-SCNC: 4.6 MMOL/L (ref 3.5–5.2)
POTASSIUM SERPL-SCNC: 4.7 MMOL/L (ref 3.5–5.2)
RBC # BLD AUTO: 4.42 10*6/MM3 (ref 4.14–5.8)
SODIUM SERPL-SCNC: 136 MMOL/L (ref 136–145)
WBC NRBC COR # BLD AUTO: 8.95 10*3/MM3 (ref 3.4–10.8)

## 2024-04-06 PROCEDURE — 85025 COMPLETE CBC W/AUTO DIFF WBC: CPT | Performed by: INTERNAL MEDICINE

## 2024-04-06 PROCEDURE — 94799 UNLISTED PULMONARY SVC/PX: CPT

## 2024-04-06 PROCEDURE — 25010000002 CEFEPIME PER 500 MG: Performed by: NURSE PRACTITIONER

## 2024-04-06 PROCEDURE — 83735 ASSAY OF MAGNESIUM: CPT | Performed by: INTERNAL MEDICINE

## 2024-04-06 PROCEDURE — 94761 N-INVAS EAR/PLS OXIMETRY MLT: CPT

## 2024-04-06 PROCEDURE — 82948 REAGENT STRIP/BLOOD GLUCOSE: CPT

## 2024-04-06 PROCEDURE — 25010000002 CEFEPIME PER 500 MG: Performed by: INTERNAL MEDICINE

## 2024-04-06 PROCEDURE — 84132 ASSAY OF SERUM POTASSIUM: CPT | Performed by: INTERNAL MEDICINE

## 2024-04-06 PROCEDURE — 80048 BASIC METABOLIC PNL TOTAL CA: CPT | Performed by: INTERNAL MEDICINE

## 2024-04-06 PROCEDURE — 63710000001 PREDNISONE PER 1 MG: Performed by: INTERNAL MEDICINE

## 2024-04-06 PROCEDURE — 63710000001 INSULIN LISPRO (HUMAN) PER 5 UNITS: Performed by: INTERNAL MEDICINE

## 2024-04-06 PROCEDURE — 94664 DEMO&/EVAL PT USE INHALER: CPT

## 2024-04-06 RX ADMIN — APIXABAN 5 MG: 5 TABLET, FILM COATED ORAL at 20:43

## 2024-04-06 RX ADMIN — CEFEPIME 2000 MG: 2 INJECTION, POWDER, FOR SOLUTION INTRAVENOUS at 12:23

## 2024-04-06 RX ADMIN — IPRATROPIUM BROMIDE AND ALBUTEROL SULFATE 3 ML: .5; 3 SOLUTION RESPIRATORY (INHALATION) at 11:04

## 2024-04-06 RX ADMIN — SACUBITRIL AND VALSARTAN 1 TABLET: 24; 26 TABLET, FILM COATED ORAL at 08:53

## 2024-04-06 RX ADMIN — CEFEPIME 2000 MG: 2 INJECTION, POWDER, FOR SOLUTION INTRAVENOUS at 20:38

## 2024-04-06 RX ADMIN — FAMOTIDINE 20 MG: 20 TABLET, FILM COATED ORAL at 20:42

## 2024-04-06 RX ADMIN — APIXABAN 5 MG: 5 TABLET, FILM COATED ORAL at 08:53

## 2024-04-06 RX ADMIN — IPRATROPIUM BROMIDE AND ALBUTEROL SULFATE 3 ML: .5; 3 SOLUTION RESPIRATORY (INHALATION) at 07:50

## 2024-04-06 RX ADMIN — INSULIN LISPRO 2 UNITS: 100 INJECTION, SOLUTION INTRAVENOUS; SUBCUTANEOUS at 20:43

## 2024-04-06 RX ADMIN — GUAIFENESIN 1200 MG: 600 TABLET, EXTENDED RELEASE ORAL at 20:42

## 2024-04-06 RX ADMIN — BUDESONIDE AND FORMOTEROL FUMARATE DIHYDRATE 2 PUFF: 160; 4.5 AEROSOL RESPIRATORY (INHALATION) at 19:49

## 2024-04-06 RX ADMIN — FOLIC ACID 1 MG: 1 TABLET ORAL at 08:53

## 2024-04-06 RX ADMIN — Medication 10 ML: at 20:41

## 2024-04-06 RX ADMIN — BUDESONIDE AND FORMOTEROL FUMARATE DIHYDRATE 2 PUFF: 160; 4.5 AEROSOL RESPIRATORY (INHALATION) at 07:52

## 2024-04-06 RX ADMIN — SACUBITRIL AND VALSARTAN 1 TABLET: 24; 26 TABLET, FILM COATED ORAL at 20:42

## 2024-04-06 RX ADMIN — METOPROLOL TARTRATE 25 MG: 25 TABLET, FILM COATED ORAL at 20:50

## 2024-04-06 RX ADMIN — FAMOTIDINE 20 MG: 20 TABLET, FILM COATED ORAL at 08:53

## 2024-04-06 RX ADMIN — ATORVASTATIN CALCIUM 80 MG: 80 TABLET, FILM COATED ORAL at 08:53

## 2024-04-06 RX ADMIN — IPRATROPIUM BROMIDE AND ALBUTEROL SULFATE 3 ML: .5; 3 SOLUTION RESPIRATORY (INHALATION) at 15:28

## 2024-04-06 RX ADMIN — GUAIFENESIN 1200 MG: 600 TABLET, EXTENDED RELEASE ORAL at 08:53

## 2024-04-06 RX ADMIN — ASPIRIN 81 MG: 81 TABLET, COATED ORAL at 08:53

## 2024-04-06 RX ADMIN — FLUTICASONE PROPIONATE 2 SPRAY: 50 SPRAY, METERED NASAL at 08:55

## 2024-04-06 RX ADMIN — INSULIN LISPRO 2 UNITS: 100 INJECTION, SOLUTION INTRAVENOUS; SUBCUTANEOUS at 17:36

## 2024-04-06 RX ADMIN — IPRATROPIUM BROMIDE AND ALBUTEROL SULFATE 3 ML: .5; 3 SOLUTION RESPIRATORY (INHALATION) at 19:49

## 2024-04-06 RX ADMIN — Medication 10 ML: at 08:55

## 2024-04-06 RX ADMIN — PREDNISONE 40 MG: 20 TABLET ORAL at 08:53

## 2024-04-06 RX ADMIN — METOPROLOL TARTRATE 25 MG: 25 TABLET, FILM COATED ORAL at 08:53

## 2024-04-06 NOTE — PROGRESS NOTES
Name: Wisam Malone ADMIT: 2024   : 1945  PCP: System, Provider Not In    MRN: [id number] LOS: 2 days   AGE/SEX: 78 y.o. male  ROOM: E4/     Subjective   Subjective   Shortness of breath continues to improve but still not back to baseline..  Positive cough productive of clear sputum.  No chest pain.  No hemoptysis.  No wheezing.  No fever or chills.  No palpitation    Review of Systems  GI.  No abdominal pain.  No nausea or vomiting.  No choking with the food or the liquids.  No bowel movement for the last 3 days but the patient states this is normal for him.  .  No dysuria or hematuria.     Objective   Objective   Vital Signs  Temp:  [97.7 °F (36.5 °C)-97.9 °F (36.6 °C)] 97.7 °F (36.5 °C)  Heart Rate:  [72-96] 90  Resp:  [18-27] 24  BP: ()/(46-62) 117/62  SpO2:  [89 %-100 %] 100 %  on  Flow (L/min):  [2] 2;   Device (Oxygen Therapy): NPPV/NIV    Intake/Output Summary (Last 24 hours) at 2024 1135  Last data filed at 2024 0919  Gross per 24 hour   Intake 717 ml   Output 1350 ml   Net -633 ml     Body mass index is 20.57 kg/m².      24  0550 24  0500 24  0535   Weight: 68.7 kg (151 lb 7.3 oz) 68.6 kg (151 lb 3.8 oz) 68.8 kg (151 lb 10.8 oz)     Physical Exam  General.  Elderly gentleman.  He is alert and oriented x 4.  No respiratory distress.  Normal mood and affect.  No diaphoresis.  Currently on NIPPV (AVAPS with tidal volume of 500 FiO2 of 30% per Pulmonary.).  Patient is on AVAPS and oxygen at home.  Eyes.  Pupils equal round and reactive.  Intact extraocular musculature.  No pallor or jaundice.  Oral cavity.  Moist mucous membrane.  Neck.  Supple.  No JVD.  No lymphadenopathy or thyromegaly.  Cardiovascular.  Controlled rate atrial fibrillation with grade 2 systolic murmur.  Chest.  Poor bilateral air entry with no added sounds.  Improved air entry since admission.  Abdomen.  Soft lax.  No tenderness.  No organomegaly.  No guarding or rebound  Extremities.   "Trace bilateral lower extremity edema.  No clubbing or cyanosis.  CNS.  No acute focal neurological deficits.    Results Review:      Results from last 7 days   Lab Units 04/06/24 0443 04/06/24  0027 04/05/24 0442 04/04/24 0918 04/04/24  0213   SODIUM mmol/L 136  --  137 133* 133*   POTASSIUM mmol/L 4.6 4.7 4.1 4.7 4.5   CHLORIDE mmol/L 102  --  104 100 97*   CO2 mmol/L 24.7  --  24.0 24.0 26.7   BUN mg/dL 27*  --  30* 18 16   CREATININE mg/dL 0.87  --  1.16 0.80 0.95   GLUCOSE mg/dL 88  --  100* 211* 143*   CALCIUM mg/dL 8.9  --  8.4* 8.4* 9.0   AST (SGOT) U/L  --   --   --   --  23   ALT (SGPT) U/L  --   --   --   --  40     Estimated Creatinine Clearance: 67 mL/min (by C-G formula based on SCr of 0.87 mg/dL).  Results from last 7 days   Lab Units 04/04/24  1526   HEMOGLOBIN A1C % 5.90*     Results from last 7 days   Lab Units 04/06/24  0741 04/05/24  2018 04/05/24  1617 04/05/24  1131 04/05/24  0716 04/04/24  2053 04/04/24  1651 04/04/24  1135   GLUCOSE mg/dL 112 115 120 85 100 121 158* 163*     Results from last 7 days   Lab Units 04/04/24  0328 04/04/24  0213   HSTROP T ng/L 38* 33*     Results from last 7 days   Lab Units 04/04/24  0213   PROBNP pg/mL 1,065.0         Results from last 7 days   Lab Units 04/06/24  0027 04/04/24  0213   MAGNESIUM mg/dL 2.0 2.0           Invalid input(s): \"LDLCALC\"  Results from last 7 days   Lab Units 04/06/24  0443 04/05/24  0442 04/04/24  0918 04/04/24  0213   WBC 10*3/mm3 8.95 8.12 5.97 9.62   HEMOGLOBIN g/dL 13.1 11.0* 12.5* 13.3   HEMATOCRIT % 40.1 33.8* 38.4 41.6   PLATELETS 10*3/mm3 261 252 283 332   MCV fL 90.7 90.9 92.3 91.0   MCH pg 29.6 29.6 30.0 29.1   MCHC g/dL 32.7 32.5 32.6 32.0   RDW % 11.6* 11.4* 11.4* 11.3*   RDW-SD fl 38.6 37.4 39.2 37.6   MPV fL 8.9 8.8 9.0 8.7   NEUTROPHIL % % 72.2 71.6  --  76.8*   LYMPHOCYTE % % 16.8* 18.0*  --  13.5*   MONOCYTES % % 7.5 8.7  --  7.5   EOSINOPHIL % % 1.7 0.4  --  0.8   BASOPHIL % % 1.0 0.4  --  0.6   IMM GRAN % % 0.8* " 0.9*  --  0.8*   NEUTROS ABS 10*3/mm3 6.47 5.82  --  7.38*   LYMPHS ABS 10*3/mm3 1.50 1.46  --  1.30   MONOS ABS 10*3/mm3 0.67 0.71  --  0.72   EOS ABS 10*3/mm3 0.15 0.03  --  0.08   BASOS ABS 10*3/mm3 0.09 0.03  --  0.06   IMMATURE GRANS (ABS) 10*3/mm3 0.07* 0.07*  --  0.08*   NRBC /100 WBC 0.0 0.0  --  0.0     Results from last 7 days   Lab Units 04/04/24  0213   INR  1.09   APTT seconds 33.4     Results from last 7 days   Lab Units 04/04/24  0214   PH, ARTERIAL pH units 7.372   PO2 ART mm Hg 57.7*   PCO2, ARTERIAL mm Hg 45.2*   HCO3 ART mmol/L 26.3     Results from last 7 days   Lab Units 04/04/24  0328 04/04/24  0213   PROCALCITONIN ng/mL  --  0.05   LACTATE mmol/L 1.1  --              Results from last 7 days   Lab Units 04/04/24  0328   BLOODCX  No growth at 2 days  No growth at 2 days     Results from last 7 days   Lab Units 04/04/24  0214   ADENOVIRUS DETECTION BY PCR  Not Detected   CORONAVIRUS 229E  Not Detected   CORONAVIRUS HKU1  Not Detected   CORONAVIRUS NL63  Not Detected   CORONAVIRUS OC43  Not Detected   HUMAN METAPNEUMOVIRUS  Not Detected   HUMAN RHINOVIRUS/ENTEROVIRUS  Not Detected   INFLUENZA B PCR  Not Detected   PARAINFLUENZA 1  Not Detected   PARAINFLUENZA VIRUS 2  Not Detected   PARAINFLUENZA VIRUS 3  Not Detected   PARAINFLUENZA VIRUS 4  Not Detected   BORDETELLA PERTUSSIS PCR  Not Detected   CHLAMYDOPHILA PNEUMONIAE PCR  Not Detected   MYCOPLAMA PNEUMO PCR  Not Detected   INFLUENZA A PCR  Not Detected   RSV, PCR  Not Detected               Imaging:  Imaging Results (Last 24 Hours)       ** No results found for the last 24 hours. **               I reviewed the patient's new clinical results / labs / tests / procedures      Assessment/Plan     Active Hospital Problems    Diagnosis  POA    **COPD exacerbation [J44.1]  Yes    Prediabetes [R73.03]  Yes    Chronic respiratory failure with hypoxia and hypercapnia [J96.11, J96.12]  Yes    Left lower lobe pneumonia [J18.9]  Yes    Paroxysmal  atrial fibrillation [I48.0]  Yes    Ischemic cardiomyopathy [I25.5]  Yes    Hypertension [I10]  Yes    Atherosclerotic coronary vascular disease [I25.10]  Yes      Resolved Hospital Problems   No resolved problems to display.       1.  Acute on chronic hypoxemic, hypercapnic respiratory failure secondary to left lower lobe pneumonia and COPD exacerbation in a patient with a history of COPD and chronic hypoxemic, hypercapnic respiratory failure on trilogy.  Slowly improving on NIPPV/p.o. steroids/DuoNebs/cefepime/Symbicort/Mucinex/I-S.  Lactic acid is normal.  Respiratory PCR and is negative.  Urine Legionella and Streptococcus antigen are negative.  Blood cultures are negative.  Awaiting sputum cultures.  Pulmonary on board.  Switched to p.o. steroids.    2.  History of coronary artery disease/hypertension/A-fib/cardiomyopathy/left ventricular thrombus.  Patient last echo on 11/23 revealing ejection fraction of 36 to 40% and left ventricular thrombus with grade 1 diastolic dysfunction.  Blood pressure is on the soft side.  Will continue aspirin/Eliquis/Lipitor/Lopressor/Entresto.  Improved hypotension with decreasing Lopressor.  There is no clinical evidence of angina or congestive heart failure.  Troponin elevated but no acute EKG changes.  proBNP is normal.  3.  GERD.  Continue Pepcid.  Benign GI examination  4.  Dyslipidemia.  Continue lipid lowering.  5.  Prediabetes.  A1c is 5.9.  Diet at discharge.  Continue sliding scale insulin while in the hospital and on steroids.  Acceptable Accu-Cheks.  6.  Anemia of chronic disease.  Hemoglobin is stable at baseline between 11.8 and 14.  Will monitor  7.  VTE prophylaxis.  Patient anticoagulated.     Discussed my findings and plan of treatment with the patient/family/nurses at multidisciplinary rounds  Disposition.  Anticipate discharge home in 2 days        Maryanne Dover MD  [facility]  04/06/24  11:35 EDT

## 2024-04-06 NOTE — PROGRESS NOTES
"                                              LOS: 2 days   Patient Care Team:  System, Provider Not In as PCP - General    Chief Complaint:  F/up respiratory failure, COPD and medical problems as below.    Subjective   Interval History    On NIPPV most of the time.  FiO2 30%.  I saw him when he was off NIPPV for 1 hour.  He was on RA.  He reported feeling almost back to his baseline.  He denies significant cough.  No dyspnea at rest.    REVIEW OF SYSTEMS:     GASTROINTESTINAL: No anorexia, nausea, vomiting or diarrhea. No abdominal pain.  CONSTITUTIONAL: No fever or chills.     Ventilator/Non-Invasive Ventilation Settings (From admission, onward)       Start     Ordered    04/04/24 0239  NIPPV (CPAP or BIPAP)  Until Discontinued        Question Answer Comment   Indication Acute Respiratory Failure    Type AVAPS/PC/PS    Backup Rate 20    Target VT (mL) 550    EPAP/PEEP (cm H2O) 25    Min Pressure (cm H2O) 15    Max Pressure (cm H2O) 6    Titrate Oxygen for SpO2 88 - 92%        04/04/24 0239                          Physical Exam:     Vital Signs  Temp:  [97.7 °F (36.5 °C)-98.4 °F (36.9 °C)] 98.4 °F (36.9 °C)  Heart Rate:  [72-96] 85  Resp:  [18-27] 22  BP: ()/(46-62) 101/46    Intake/Output Summary (Last 24 hours) at 4/6/2024 1353  Last data filed at 4/6/2024 1317  Gross per 24 hour   Intake 717 ml   Output 1575 ml   Net -858 ml     Flowsheet Rows      Flowsheet Row First Filed Value   Admission Height 182.9 cm (72\") Documented at 04/04/2024 0208   Admission Weight 68 kg (150 lb) Documented at 04/04/2024 0208            PPE used per hospital policy    General Appearance:   Alert, cooperative, in no acute distress   ENMT: Mouth not examined due to presence of mask.  External ears normal.   Eyes:  Pupils equal and reactive to light. EOMI   Neck:    Trachea midline. No thyromegaly.   Lungs:   Equal but diminished air entry throughout.  No audible crackles or wheezing.    Heart:   Regular rhythm and normal rate, " normal S1 and S2, no         murmur   Skin:   No rash or ecchymosis   Abdomen:     Soft. No tenderness. No HSM.   Neuro/psych:  Conscious, alert, oriented x3. Strength 5/5 in upper and lower  ext.  Appropriate mood and affect   Extremities:  No cyanosis, clubbing but trace edema.  Warm extremities and well-perfused          Results Review:        Results from last 7 days   Lab Units 04/06/24  0443 04/06/24  0027 04/05/24 0442 04/04/24  0918   SODIUM mmol/L 136  --  137 133*   POTASSIUM mmol/L 4.6 4.7 4.1 4.7   CHLORIDE mmol/L 102  --  104 100   CO2 mmol/L 24.7  --  24.0 24.0   BUN mg/dL 27*  --  30* 18   CREATININE mg/dL 0.87  --  1.16 0.80   GLUCOSE mg/dL 88  --  100* 211*   CALCIUM mg/dL 8.9  --  8.4* 8.4*     Results from last 7 days   Lab Units 04/04/24  0328 04/04/24  0213   HSTROP T ng/L 38* 33*     Results from last 7 days   Lab Units 04/06/24  0443 04/05/24  0442 04/04/24  0918   WBC 10*3/mm3 8.95 8.12 5.97   HEMOGLOBIN g/dL 13.1 11.0* 12.5*   HEMATOCRIT % 40.1 33.8* 38.4   PLATELETS 10*3/mm3 261 252 283     Results from last 7 days   Lab Units 04/04/24  0213   INR  1.09   APTT seconds 33.4     Results from last 7 days   Lab Units 04/04/24  0213   PROBNP pg/mL 1,065.0                   Results from last 7 days   Lab Units 04/04/24  0214   PH, ARTERIAL pH units 7.372   PCO2, ARTERIAL mm Hg 45.2*   PO2 ART mm Hg 57.7*   O2 SATURATION ART % 88.6*   FLOW RATE lpm 4.0000   MODALITY  Cannula         I reviewed the patient's new clinical results.        Medication Review:   apixaban, 5 mg, Oral, Q12H  aspirin, 81 mg, Oral, Daily  atorvastatin, 80 mg, Oral, Daily  budesonide-formoterol, 2 puff, Inhalation, BID - RT  cefepime, 2,000 mg, Intravenous, Q12H  famotidine, 20 mg, Oral, BID  fluticasone, 2 spray, Nasal, Daily  folic acid, 1 mg, Oral, Daily  guaiFENesin, 1,200 mg, Oral, BID  insulin lispro, 2-7 Units, Subcutaneous, 4x Daily AC & at Bedtime  ipratropium-albuterol, 3 mL, Nebulization, 4x Daily -  RT  metoprolol tartrate, 25 mg, Oral, BID  polyethylene glycol, 17 g, Oral, Daily  predniSONE, 40 mg, Oral, Daily With Breakfast  sacubitril-valsartan, 1 tablet, Oral, BID  sodium chloride, 10 mL, Intravenous, Q12H              Assessment   COPD exacerbation  LLL opacity, possible pneumonia  Chronic hypercapnic respiratory failure, on trilogy ventilator  Mild anemia        Plan       NIPPV @ night and PRN. Could use his home unit if available   Prednisone 40 mg day 1  Cefepime but could discontinue at 5 days due to lack of strong evidence to suggest bacterial pneumonia  DuoNeb 4 times a day and Symbicort 2 puffs twice daily  Mucinex 1200 mg twice daily  VTE prophylaxis: Eliquis    Dispo: Okay to discharge tomorrow  Discussed with family    Rob Marquez MD  04/06/24  13:53 EDT          This note was dictated utilizing Dragon dictation

## 2024-04-06 NOTE — PLAN OF CARE
Goal Outcome Evaluation:  Plan of Care Reviewed With: patient        Progress: improving  Outcome Evaluation: Vitals stable. Pt maintaining O2 sat between room air and wearing biPap while asleep. IV abx cont'd. pt needs met and questions answered at this time. Will continue to monitor.

## 2024-04-07 LAB
ANION GAP SERPL CALCULATED.3IONS-SCNC: 10 MMOL/L (ref 5–15)
BASOPHILS # BLD AUTO: 0.02 10*3/MM3 (ref 0–0.2)
BASOPHILS NFR BLD AUTO: 0.2 % (ref 0–1.5)
BUN SERPL-MCNC: 24 MG/DL (ref 8–23)
BUN/CREAT SERPL: 29.6 (ref 7–25)
CALCIUM SPEC-SCNC: 8.2 MG/DL (ref 8.6–10.5)
CHLORIDE SERPL-SCNC: 103 MMOL/L (ref 98–107)
CO2 SERPL-SCNC: 21 MMOL/L (ref 22–29)
CREAT SERPL-MCNC: 0.81 MG/DL (ref 0.76–1.27)
DEPRECATED RDW RBC AUTO: 38.6 FL (ref 37–54)
EGFRCR SERPLBLD CKD-EPI 2021: 89.7 ML/MIN/1.73
EOSINOPHIL # BLD AUTO: 0.01 10*3/MM3 (ref 0–0.4)
EOSINOPHIL NFR BLD AUTO: 0.1 % (ref 0.3–6.2)
ERYTHROCYTE [DISTWIDTH] IN BLOOD BY AUTOMATED COUNT: 11.4 % (ref 12.3–15.4)
GLUCOSE BLDC GLUCOMTR-MCNC: 108 MG/DL (ref 70–130)
GLUCOSE BLDC GLUCOMTR-MCNC: 117 MG/DL (ref 70–130)
GLUCOSE BLDC GLUCOMTR-MCNC: 141 MG/DL (ref 70–130)
GLUCOSE BLDC GLUCOMTR-MCNC: 195 MG/DL (ref 70–130)
GLUCOSE SERPL-MCNC: 120 MG/DL (ref 65–99)
HCT VFR BLD AUTO: 37.4 % (ref 37.5–51)
HGB BLD-MCNC: 12.1 G/DL (ref 13–17.7)
IMM GRANULOCYTES # BLD AUTO: 0.07 10*3/MM3 (ref 0–0.05)
IMM GRANULOCYTES NFR BLD AUTO: 0.8 % (ref 0–0.5)
LYMPHOCYTES # BLD AUTO: 1 10*3/MM3 (ref 0.7–3.1)
LYMPHOCYTES NFR BLD AUTO: 11.4 % (ref 19.6–45.3)
MCH RBC QN AUTO: 30.2 PG (ref 26.6–33)
MCHC RBC AUTO-ENTMCNC: 32.4 G/DL (ref 31.5–35.7)
MCV RBC AUTO: 93.3 FL (ref 79–97)
MONOCYTES # BLD AUTO: 0.62 10*3/MM3 (ref 0.1–0.9)
MONOCYTES NFR BLD AUTO: 7.1 % (ref 5–12)
NEUTROPHILS NFR BLD AUTO: 7.04 10*3/MM3 (ref 1.7–7)
NEUTROPHILS NFR BLD AUTO: 80.4 % (ref 42.7–76)
NRBC BLD AUTO-RTO: 0 /100 WBC (ref 0–0.2)
PLATELET # BLD AUTO: 222 10*3/MM3 (ref 140–450)
PMV BLD AUTO: 8.9 FL (ref 6–12)
POTASSIUM SERPL-SCNC: 4.4 MMOL/L (ref 3.5–5.2)
QT INTERVAL: 405 MS
QTC INTERVAL: 434 MS
RBC # BLD AUTO: 4.01 10*6/MM3 (ref 4.14–5.8)
SODIUM SERPL-SCNC: 134 MMOL/L (ref 136–145)
WBC NRBC COR # BLD AUTO: 8.76 10*3/MM3 (ref 3.4–10.8)

## 2024-04-07 PROCEDURE — 85025 COMPLETE CBC W/AUTO DIFF WBC: CPT | Performed by: INTERNAL MEDICINE

## 2024-04-07 PROCEDURE — 94761 N-INVAS EAR/PLS OXIMETRY MLT: CPT

## 2024-04-07 PROCEDURE — 94799 UNLISTED PULMONARY SVC/PX: CPT

## 2024-04-07 PROCEDURE — 80048 BASIC METABOLIC PNL TOTAL CA: CPT | Performed by: INTERNAL MEDICINE

## 2024-04-07 PROCEDURE — 94762 N-INVAS EAR/PLS OXIMTRY CONT: CPT

## 2024-04-07 PROCEDURE — 25010000002 CEFEPIME PER 500 MG: Performed by: NURSE PRACTITIONER

## 2024-04-07 PROCEDURE — 94664 DEMO&/EVAL PT USE INHALER: CPT

## 2024-04-07 PROCEDURE — 94660 CPAP INITIATION&MGMT: CPT

## 2024-04-07 PROCEDURE — 63710000001 INSULIN LISPRO (HUMAN) PER 5 UNITS: Performed by: INTERNAL MEDICINE

## 2024-04-07 PROCEDURE — 82948 REAGENT STRIP/BLOOD GLUCOSE: CPT

## 2024-04-07 PROCEDURE — 63710000001 PREDNISONE PER 1 MG: Performed by: INTERNAL MEDICINE

## 2024-04-07 RX ORDER — PREDNISONE 20 MG/1
20 TABLET ORAL
Status: DISCONTINUED | OUTPATIENT
Start: 2024-04-08 | End: 2024-04-08 | Stop reason: HOSPADM

## 2024-04-07 RX ADMIN — FLUTICASONE PROPIONATE 2 SPRAY: 50 SPRAY, METERED NASAL at 08:24

## 2024-04-07 RX ADMIN — CEFEPIME 2000 MG: 2 INJECTION, POWDER, FOR SOLUTION INTRAVENOUS at 21:36

## 2024-04-07 RX ADMIN — Medication 10 ML: at 08:24

## 2024-04-07 RX ADMIN — SACUBITRIL AND VALSARTAN 1 TABLET: 24; 26 TABLET, FILM COATED ORAL at 08:24

## 2024-04-07 RX ADMIN — Medication 10 ML: at 20:16

## 2024-04-07 RX ADMIN — ATORVASTATIN CALCIUM 80 MG: 80 TABLET, FILM COATED ORAL at 08:24

## 2024-04-07 RX ADMIN — CEFEPIME 2000 MG: 2 INJECTION, POWDER, FOR SOLUTION INTRAVENOUS at 03:56

## 2024-04-07 RX ADMIN — INSULIN LISPRO 2 UNITS: 100 INJECTION, SOLUTION INTRAVENOUS; SUBCUTANEOUS at 21:36

## 2024-04-07 RX ADMIN — GUAIFENESIN 1200 MG: 600 TABLET, EXTENDED RELEASE ORAL at 20:15

## 2024-04-07 RX ADMIN — BUDESONIDE AND FORMOTEROL FUMARATE DIHYDRATE 2 PUFF: 160; 4.5 AEROSOL RESPIRATORY (INHALATION) at 20:10

## 2024-04-07 RX ADMIN — FAMOTIDINE 20 MG: 20 TABLET, FILM COATED ORAL at 20:15

## 2024-04-07 RX ADMIN — GUAIFENESIN 1200 MG: 600 TABLET, EXTENDED RELEASE ORAL at 08:24

## 2024-04-07 RX ADMIN — METOPROLOL TARTRATE 25 MG: 25 TABLET, FILM COATED ORAL at 08:23

## 2024-04-07 RX ADMIN — BUDESONIDE AND FORMOTEROL FUMARATE DIHYDRATE 2 PUFF: 160; 4.5 AEROSOL RESPIRATORY (INHALATION) at 07:00

## 2024-04-07 RX ADMIN — APIXABAN 5 MG: 5 TABLET, FILM COATED ORAL at 08:24

## 2024-04-07 RX ADMIN — APIXABAN 5 MG: 5 TABLET, FILM COATED ORAL at 20:15

## 2024-04-07 RX ADMIN — IPRATROPIUM BROMIDE AND ALBUTEROL SULFATE 3 ML: .5; 3 SOLUTION RESPIRATORY (INHALATION) at 07:01

## 2024-04-07 RX ADMIN — IPRATROPIUM BROMIDE AND ALBUTEROL SULFATE 3 ML: .5; 3 SOLUTION RESPIRATORY (INHALATION) at 20:10

## 2024-04-07 RX ADMIN — PREDNISONE 40 MG: 20 TABLET ORAL at 08:23

## 2024-04-07 RX ADMIN — POLYETHYLENE GLYCOL 3350 17 G: 17 POWDER, FOR SOLUTION ORAL at 08:23

## 2024-04-07 RX ADMIN — IPRATROPIUM BROMIDE AND ALBUTEROL SULFATE 3 ML: .5; 3 SOLUTION RESPIRATORY (INHALATION) at 10:00

## 2024-04-07 RX ADMIN — FAMOTIDINE 20 MG: 20 TABLET, FILM COATED ORAL at 08:24

## 2024-04-07 RX ADMIN — FOLIC ACID 1 MG: 1 TABLET ORAL at 08:24

## 2024-04-07 RX ADMIN — ASPIRIN 81 MG: 81 TABLET, COATED ORAL at 08:24

## 2024-04-07 RX ADMIN — IPRATROPIUM BROMIDE AND ALBUTEROL SULFATE 3 ML: .5; 3 SOLUTION RESPIRATORY (INHALATION) at 14:55

## 2024-04-07 RX ADMIN — SACUBITRIL AND VALSARTAN 1 TABLET: 24; 26 TABLET, FILM COATED ORAL at 20:15

## 2024-04-07 RX ADMIN — CEFEPIME 2000 MG: 2 INJECTION, POWDER, FOR SOLUTION INTRAVENOUS at 11:59

## 2024-04-07 RX ADMIN — METOPROLOL TARTRATE 25 MG: 25 TABLET, FILM COATED ORAL at 20:15

## 2024-04-07 NOTE — PROGRESS NOTES
Name: Wisam Malone ADMIT: 2024   : 1945  PCP: System, Provider Not In    MRN: [id number] LOS: 3 days   AGE/SEX: 78 y.o. male  ROOM: E4/     Subjective   Subjective   Shortness of breath/cough productive of whitish sputum continues to improve.  Patient is almost back to baseline..  No wheezing.  No chest pain.  No hemoptysis.    No fever or chills.  No palpitation    Review of Systems  GI.  No abdominal pain.  No nausea or vomiting.  No choking with the food or the liquids.  Normal bowel movements.    .  No dysuria or hematuria.     Objective   Objective   Vital Signs  Temp:  [97.9 °F (36.6 °C)-98.4 °F (36.9 °C)] 97.9 °F (36.6 °C)  Heart Rate:  [] 93  Resp:  [18-22] 18  BP: ()/(46-82) 100/82  SpO2:  [92 %-100 %] 97 %  on   ;   Device (Oxygen Therapy): room air    Intake/Output Summary (Last 24 hours) at 2024 1132  Last data filed at 2024 0824  Gross per 24 hour   Intake 440 ml   Output 1100 ml   Net -660 ml     Body mass index is 20.57 kg/m².      24  0500 24  0535 24  0500   Weight: 68.6 kg (151 lb 3.8 oz) 68.8 kg (151 lb 10.8 oz) 68.8 kg (151 lb 10.8 oz)     Physical Exam  General.  Elderly gentleman.  He is alert and oriented x 4.  No respiratory distress.  Normal mood and affect.  No diaphoresis.  Currently on room air.  (AVAPS with tidal volume of 500 FiO2 of 30% per Pulmonary nightly and as needed..).  Patient is on AVAPS and oxygen at home.  Eyes.  Pupils equal round and reactive.  Intact extraocular musculature.  No pallor or jaundice.  Oral cavity.  Moist mucous membrane.  Neck.  Supple.  No JVD.  No lymphadenopathy or thyromegaly.  Cardiovascular.  Regular rate and rhythm with occasional ectopic beats and grade 2 systolic murmur.    Chest.  Poor bilateral air entry with no added sounds.  Air entry continues to improve.    Abdomen.  Soft lax.  No tenderness.  No organomegaly.  No guarding or rebound  Extremities.  No clubbing/cyanosis/edema.   "  CNS.  No acute focal neurological deficits.    Results Review:      Results from last 7 days   Lab Units 04/07/24  0342 04/06/24 0443 04/06/24  0027 04/05/24 0442 04/04/24  0918 04/04/24  0213   SODIUM mmol/L 134* 136  --  137 133* 133*   POTASSIUM mmol/L 4.4 4.6 4.7 4.1 4.7 4.5   CHLORIDE mmol/L 103 102  --  104 100 97*   CO2 mmol/L 21.0* 24.7  --  24.0 24.0 26.7   BUN mg/dL 24* 27*  --  30* 18 16   CREATININE mg/dL 0.81 0.87  --  1.16 0.80 0.95   GLUCOSE mg/dL 120* 88  --  100* 211* 143*   CALCIUM mg/dL 8.2* 8.9  --  8.4* 8.4* 9.0   AST (SGOT) U/L  --   --   --   --   --  23   ALT (SGPT) U/L  --   --   --   --   --  40     Estimated Creatinine Clearance: 72 mL/min (by C-G formula based on SCr of 0.81 mg/dL).  Results from last 7 days   Lab Units 04/04/24  1526   HEMOGLOBIN A1C % 5.90*     Results from last 7 days   Lab Units 04/07/24  0751 04/06/24 2011 04/06/24  1706 04/06/24  1218 04/06/24  0741 04/05/24  2018 04/05/24  1617 04/05/24  1131   GLUCOSE mg/dL 108 183* 198* 111 112 115 120 85     Results from last 7 days   Lab Units 04/04/24  0328 04/04/24  0213   HSTROP T ng/L 38* 33*     Results from last 7 days   Lab Units 04/04/24  0213   PROBNP pg/mL 1,065.0         Results from last 7 days   Lab Units 04/06/24  0027 04/04/24  0213   MAGNESIUM mg/dL 2.0 2.0           Invalid input(s): \"LDLCALC\"  Results from last 7 days   Lab Units 04/07/24  0342 04/06/24  0443 04/05/24  0442 04/04/24  0918 04/04/24  0213   WBC 10*3/mm3 8.76 8.95 8.12 5.97 9.62   HEMOGLOBIN g/dL 12.1* 13.1 11.0* 12.5* 13.3   HEMATOCRIT % 37.4* 40.1 33.8* 38.4 41.6   PLATELETS 10*3/mm3 222 261 252 283 332   MCV fL 93.3 90.7 90.9 92.3 91.0   MCH pg 30.2 29.6 29.6 30.0 29.1   MCHC g/dL 32.4 32.7 32.5 32.6 32.0   RDW % 11.4* 11.6* 11.4* 11.4* 11.3*   RDW-SD fl 38.6 38.6 37.4 39.2 37.6   MPV fL 8.9 8.9 8.8 9.0 8.7   NEUTROPHIL % % 80.4* 72.2 71.6  --  76.8*   LYMPHOCYTE % % 11.4* 16.8* 18.0*  --  13.5*   MONOCYTES % % 7.1 7.5 8.7  --  7.5 "   EOSINOPHIL % % 0.1* 1.7 0.4  --  0.8   BASOPHIL % % 0.2 1.0 0.4  --  0.6   IMM GRAN % % 0.8* 0.8* 0.9*  --  0.8*   NEUTROS ABS 10*3/mm3 7.04* 6.47 5.82  --  7.38*   LYMPHS ABS 10*3/mm3 1.00 1.50 1.46  --  1.30   MONOS ABS 10*3/mm3 0.62 0.67 0.71  --  0.72   EOS ABS 10*3/mm3 0.01 0.15 0.03  --  0.08   BASOS ABS 10*3/mm3 0.02 0.09 0.03  --  0.06   IMMATURE GRANS (ABS) 10*3/mm3 0.07* 0.07* 0.07*  --  0.08*   NRBC /100 WBC 0.0 0.0 0.0  --  0.0     Results from last 7 days   Lab Units 04/04/24  0213   INR  1.09   APTT seconds 33.4     Results from last 7 days   Lab Units 04/04/24  0214   PH, ARTERIAL pH units 7.372   PO2 ART mm Hg 57.7*   PCO2, ARTERIAL mm Hg 45.2*   HCO3 ART mmol/L 26.3     Results from last 7 days   Lab Units 04/04/24  0328 04/04/24  0213   PROCALCITONIN ng/mL  --  0.05   LACTATE mmol/L 1.1  --              Results from last 7 days   Lab Units 04/04/24  0328   BLOODCX  No growth at 3 days  No growth at 3 days     Results from last 7 days   Lab Units 04/04/24  0214   ADENOVIRUS DETECTION BY PCR  Not Detected   CORONAVIRUS 229E  Not Detected   CORONAVIRUS HKU1  Not Detected   CORONAVIRUS NL63  Not Detected   CORONAVIRUS OC43  Not Detected   HUMAN METAPNEUMOVIRUS  Not Detected   HUMAN RHINOVIRUS/ENTEROVIRUS  Not Detected   INFLUENZA B PCR  Not Detected   PARAINFLUENZA 1  Not Detected   PARAINFLUENZA VIRUS 2  Not Detected   PARAINFLUENZA VIRUS 3  Not Detected   PARAINFLUENZA VIRUS 4  Not Detected   BORDETELLA PERTUSSIS PCR  Not Detected   CHLAMYDOPHILA PNEUMONIAE PCR  Not Detected   MYCOPLAMA PNEUMO PCR  Not Detected   INFLUENZA A PCR  Not Detected   RSV, PCR  Not Detected               Imaging:  Imaging Results (Last 24 Hours)       ** No results found for the last 24 hours. **               I reviewed the patient's new clinical results / labs / tests / procedures      Assessment/Plan     Active Hospital Problems    Diagnosis  POA    **COPD exacerbation [J44.1]  Yes    Prediabetes [R73.03]  Yes     Chronic respiratory failure with hypoxia and hypercapnia [J96.11, J96.12]  Yes    Left lower lobe pneumonia [J18.9]  Yes    Paroxysmal atrial fibrillation [I48.0]  Yes    Ischemic cardiomyopathy [I25.5]  Yes    Hypertension [I10]  Yes    Atherosclerotic coronary vascular disease [I25.10]  Yes      Resolved Hospital Problems   No resolved problems to display.       1.  Acute on chronic hypoxemic, hypercapnic respiratory failure secondary to left lower lobe pneumonia and COPD exacerbation in a patient with a history of COPD and chronic hypoxemic, hypercapnic respiratory failure on trilogy.  Slowly improving on NIPPV/p.o. steroids/DuoNebs/cefepime/Symbicort/Mucinex/I-S.  Lactic acid is normal.  Respiratory PCR and is negative.  Urine Legionella and Streptococcus antigen are negative.  Blood cultures are negative.  Awaiting sputum cultures.  Pulmonary on board.  Switched to p.o. steroids.    2.  History of coronary artery disease/hypertension/A-fib/cardiomyopathy/left ventricular thrombus.  Patient last echo on 11/23 revealing ejection fraction of 36 to 40% and left ventricular thrombus with grade 1 diastolic dysfunction.  Good blood pressure control.  Continue aspirin/Eliquis/Lipitor/Lopressor (dose decreased)/Entresto.  here is no clinical evidence of angina or congestive heart failure.  Troponin elevated but no acute EKG changes.  proBNP is normal.  3.  GERD.  Continue Pepcid.  Benign GI examination  4.  Dyslipidemia.  Continue lipid lowering.  5.  Prediabetes.  A1c is 5.9.  Diet at discharge.  Continue sliding scale insulin while in the hospital and on steroids.  Acceptable Accu-Cheks.  6.  Anemia of chronic disease.  Hemoglobin is stable at baseline between 11.8 and 14.  Will monitor  7.  VTE prophylaxis.  Patient anticoagulated.     Discussed my findings and plan of treatment with the patient/family/nurses at multidisciplinary rounds  Disposition.  Anticipate discharge home tomorrow        Maribellr SAVANA Dover  MD [facility]  04/07/24  11:32 EDT

## 2024-04-07 NOTE — PLAN OF CARE
Goal Outcome Evaluation:  Plan of Care Reviewed With: patient        Progress: improving  Outcome Evaluation: Vitals stable. Pt maintaining O2 sat on room air. Pt using bipap while asleep. Possible dc tomorrow 4/8. Pt needs met and questions answered. Will continue to monitor.

## 2024-04-07 NOTE — PROGRESS NOTES
"                                              LOS: 3 days   Patient Care Team:  System, Provider Not In as PCP - General    Chief Complaint:  F/up respiratory failure, COPD and medical problems as below.    Subjective   Interval History    On NIPPV most of the time.  FiO2 30%.  He reported improved breathing.  No significant cough.    REVIEW OF SYSTEMS:     GASTROINTESTINAL: No anorexia, nausea, vomiting or diarrhea. No abdominal pain.  CONSTITUTIONAL: No fever or chills.     Ventilator/Non-Invasive Ventilation Settings (From admission, onward)       Start     Ordered    04/04/24 0239  NIPPV (CPAP or BIPAP)  Until Discontinued        Question Answer Comment   Indication Acute Respiratory Failure    Type AVAPS/PC/PS    Backup Rate 20    Target VT (mL) 550    EPAP/PEEP (cm H2O) 25    Min Pressure (cm H2O) 15    Max Pressure (cm H2O) 6    Titrate Oxygen for SpO2 88 - 92%        04/04/24 0239                          Physical Exam:     Vital Signs  Temp:  [97.9 °F (36.6 °C)-98.8 °F (37.1 °C)] 98.8 °F (37.1 °C)  Heart Rate:  [] 83  Resp:  [18-22] 18  BP: ()/(50-82) 100/51    Intake/Output Summary (Last 24 hours) at 4/7/2024 1347  Last data filed at 4/7/2024 1334  Gross per 24 hour   Intake 440 ml   Output 875 ml   Net -435 ml     Flowsheet Rows      Flowsheet Row First Filed Value   Admission Height 182.9 cm (72\") Documented at 04/04/2024 0208   Admission Weight 68 kg (150 lb) Documented at 04/04/2024 0208            PPE used per hospital policy    General Appearance:   Alert, cooperative, in no acute distress   ENMT: Mouth not examined due to presence of mask.  External ears normal.   Eyes:  Pupils equal and reactive to light. EOMI   Neck:    Trachea midline. No thyromegaly.   Lungs:   Equal but diminished air entry throughout.  No audible crackles or wheezing.    Heart:   Regular rhythm and normal rate, normal S1 and S2, no         murmur   Skin:   No rash or ecchymosis   Abdomen:     Soft. No tenderness. " No HSM.   Neuro/psych:  Conscious, alert, oriented x3. Strength 5/5 in upper and lower  ext.  Appropriate mood and affect   Extremities:  No cyanosis, clubbing but trace edema.  Warm extremities and well-perfused          Results Review:        Results from last 7 days   Lab Units 04/07/24  0342 04/06/24  0443 04/06/24  0027 04/05/24 0442   SODIUM mmol/L 134* 136  --  137   POTASSIUM mmol/L 4.4 4.6 4.7 4.1   CHLORIDE mmol/L 103 102  --  104   CO2 mmol/L 21.0* 24.7  --  24.0   BUN mg/dL 24* 27*  --  30*   CREATININE mg/dL 0.81 0.87  --  1.16   GLUCOSE mg/dL 120* 88  --  100*   CALCIUM mg/dL 8.2* 8.9  --  8.4*     Results from last 7 days   Lab Units 04/04/24  0328 04/04/24  0213   HSTROP T ng/L 38* 33*     Results from last 7 days   Lab Units 04/07/24  0342 04/06/24  0443 04/05/24  0442   WBC 10*3/mm3 8.76 8.95 8.12   HEMOGLOBIN g/dL 12.1* 13.1 11.0*   HEMATOCRIT % 37.4* 40.1 33.8*   PLATELETS 10*3/mm3 222 261 252     Results from last 7 days   Lab Units 04/04/24  0213   INR  1.09   APTT seconds 33.4     Results from last 7 days   Lab Units 04/04/24  0213   PROBNP pg/mL 1,065.0                   Results from last 7 days   Lab Units 04/04/24  0214   PH, ARTERIAL pH units 7.372   PCO2, ARTERIAL mm Hg 45.2*   PO2 ART mm Hg 57.7*   O2 SATURATION ART % 88.6*   FLOW RATE lpm 4.0000   MODALITY  Cannula         I reviewed the patient's new clinical results.        Medication Review:   apixaban, 5 mg, Oral, Q12H  aspirin, 81 mg, Oral, Daily  atorvastatin, 80 mg, Oral, Daily  budesonide-formoterol, 2 puff, Inhalation, BID - RT  cefepime, 2,000 mg, Intravenous, Q8H  famotidine, 20 mg, Oral, BID  fluticasone, 2 spray, Nasal, Daily  folic acid, 1 mg, Oral, Daily  guaiFENesin, 1,200 mg, Oral, BID  insulin lispro, 2-7 Units, Subcutaneous, 4x Daily AC & at Bedtime  ipratropium-albuterol, 3 mL, Nebulization, 4x Daily - RT  metoprolol tartrate, 25 mg, Oral, BID  polyethylene glycol, 17 g, Oral, Daily  predniSONE, 40 mg, Oral, Daily  With Breakfast  sacubitril-valsartan, 1 tablet, Oral, BID  sodium chloride, 10 mL, Intravenous, Q12H              Assessment   COPD exacerbation  LLL opacity, possible pneumonia  Chronic hypercapnic respiratory failure, on trilogy ventilator  Mild anemia        Plan       NIPPV @ night and PRN. Could use his home unit if available   Prednisone 40 mg day 2-reduced down to 20 mg and continue for 3 more days then stop.  Cefepime but could discontinue at 5 days due to lack of strong evidence to suggest bacterial pneumonia  DuoNeb 4 times a day and Symbicort 2 puffs twice daily  Mucinex 1200 mg twice daily  VTE prophylaxis: Eliquis    Dispo: Okay to discharge.  Thank you for the consult.  Will sign off for now but please call back if needed.  [facility].    Rob Marquez MD  04/07/24  13:47 EDT          This note was dictated utilizing [employer] dictation

## 2024-04-07 NOTE — PLAN OF CARE
Goal Outcome Evaluation:   Patient are mad because of his cpap machine keep on alarming, decided to call the RT to fix it . VS stable , keep dry and comfortable , urinal with in reach. Due antibiotics given.

## 2024-04-08 ENCOUNTER — READMISSION MANAGEMENT (OUTPATIENT)
Dept: CALL CENTER | Facility: HOSPITAL | Age: 79
End: 2024-04-08
Payer: MEDICARE

## 2024-04-08 VITALS
HEART RATE: 79 BPM | TEMPERATURE: 98.2 F | OXYGEN SATURATION: 100 % | RESPIRATION RATE: 20 BRPM | BODY MASS INDEX: 20.31 KG/M2 | HEIGHT: 72 IN | SYSTOLIC BLOOD PRESSURE: 106 MMHG | DIASTOLIC BLOOD PRESSURE: 55 MMHG | WEIGHT: 149.91 LBS

## 2024-04-08 PROBLEM — J18.9 PNEUMONIA, UNSPECIFIED ORGANISM: Status: ACTIVE | Noted: 2024-04-08

## 2024-04-08 LAB
ANION GAP SERPL CALCULATED.3IONS-SCNC: 9.3 MMOL/L (ref 5–15)
BASOPHILS # BLD AUTO: 0.03 10*3/MM3 (ref 0–0.2)
BASOPHILS NFR BLD AUTO: 0.3 % (ref 0–1.5)
BUN SERPL-MCNC: 23 MG/DL (ref 8–23)
BUN/CREAT SERPL: 29.9 (ref 7–25)
CALCIUM SPEC-SCNC: 8.4 MG/DL (ref 8.6–10.5)
CHLORIDE SERPL-SCNC: 103 MMOL/L (ref 98–107)
CO2 SERPL-SCNC: 23.7 MMOL/L (ref 22–29)
CREAT SERPL-MCNC: 0.77 MG/DL (ref 0.76–1.27)
DEPRECATED RDW RBC AUTO: 39.7 FL (ref 37–54)
EGFRCR SERPLBLD CKD-EPI 2021: 91.1 ML/MIN/1.73
EOSINOPHIL # BLD AUTO: 0.01 10*3/MM3 (ref 0–0.4)
EOSINOPHIL NFR BLD AUTO: 0.1 % (ref 0.3–6.2)
ERYTHROCYTE [DISTWIDTH] IN BLOOD BY AUTOMATED COUNT: 11.7 % (ref 12.3–15.4)
GLUCOSE BLDC GLUCOMTR-MCNC: 104 MG/DL (ref 70–130)
GLUCOSE BLDC GLUCOMTR-MCNC: 124 MG/DL (ref 70–130)
GLUCOSE SERPL-MCNC: 120 MG/DL (ref 65–99)
HCT VFR BLD AUTO: 36.4 % (ref 37.5–51)
HGB BLD-MCNC: 12 G/DL (ref 13–17.7)
IMM GRANULOCYTES # BLD AUTO: 0.06 10*3/MM3 (ref 0–0.05)
IMM GRANULOCYTES NFR BLD AUTO: 0.6 % (ref 0–0.5)
LYMPHOCYTES # BLD AUTO: 1.14 10*3/MM3 (ref 0.7–3.1)
LYMPHOCYTES NFR BLD AUTO: 11.8 % (ref 19.6–45.3)
MCH RBC QN AUTO: 30.1 PG (ref 26.6–33)
MCHC RBC AUTO-ENTMCNC: 33 G/DL (ref 31.5–35.7)
MCV RBC AUTO: 91.2 FL (ref 79–97)
MONOCYTES # BLD AUTO: 0.7 10*3/MM3 (ref 0.1–0.9)
MONOCYTES NFR BLD AUTO: 7.2 % (ref 5–12)
NEUTROPHILS NFR BLD AUTO: 7.75 10*3/MM3 (ref 1.7–7)
NEUTROPHILS NFR BLD AUTO: 80 % (ref 42.7–76)
NRBC BLD AUTO-RTO: 0 /100 WBC (ref 0–0.2)
PLATELET # BLD AUTO: 257 10*3/MM3 (ref 140–450)
PMV BLD AUTO: 9.2 FL (ref 6–12)
POTASSIUM SERPL-SCNC: 4.5 MMOL/L (ref 3.5–5.2)
RBC # BLD AUTO: 3.99 10*6/MM3 (ref 4.14–5.8)
SODIUM SERPL-SCNC: 136 MMOL/L (ref 136–145)
WBC NRBC COR # BLD AUTO: 9.69 10*3/MM3 (ref 3.4–10.8)

## 2024-04-08 PROCEDURE — 82948 REAGENT STRIP/BLOOD GLUCOSE: CPT

## 2024-04-08 PROCEDURE — 94761 N-INVAS EAR/PLS OXIMETRY MLT: CPT

## 2024-04-08 PROCEDURE — 94760 N-INVAS EAR/PLS OXIMETRY 1: CPT

## 2024-04-08 PROCEDURE — 25010000002 CEFEPIME PER 500 MG: Performed by: NURSE PRACTITIONER

## 2024-04-08 PROCEDURE — 63710000001 PREDNISONE PER 1 MG: Performed by: INTERNAL MEDICINE

## 2024-04-08 PROCEDURE — 94799 UNLISTED PULMONARY SVC/PX: CPT

## 2024-04-08 PROCEDURE — 85025 COMPLETE CBC W/AUTO DIFF WBC: CPT | Performed by: INTERNAL MEDICINE

## 2024-04-08 PROCEDURE — 80048 BASIC METABOLIC PNL TOTAL CA: CPT | Performed by: INTERNAL MEDICINE

## 2024-04-08 RX ORDER — AMOXICILLIN AND CLAVULANATE POTASSIUM 875; 125 MG/1; MG/1
1 TABLET, FILM COATED ORAL 2 TIMES DAILY
Qty: 4 TABLET | Refills: 0 | Status: SHIPPED | OUTPATIENT
Start: 2024-04-08 | End: 2024-04-10

## 2024-04-08 RX ORDER — PREDNISONE 20 MG/1
20 TABLET ORAL
Qty: 3 TABLET | Refills: 0 | Status: SHIPPED | OUTPATIENT
Start: 2024-04-08 | End: 2024-04-11

## 2024-04-08 RX ADMIN — CEFEPIME 2000 MG: 2 INJECTION, POWDER, FOR SOLUTION INTRAVENOUS at 03:33

## 2024-04-08 RX ADMIN — Medication 10 ML: at 09:06

## 2024-04-08 RX ADMIN — CEFEPIME 2000 MG: 2 INJECTION, POWDER, FOR SOLUTION INTRAVENOUS at 10:26

## 2024-04-08 RX ADMIN — ASPIRIN 81 MG: 81 TABLET, COATED ORAL at 09:02

## 2024-04-08 RX ADMIN — IPRATROPIUM BROMIDE AND ALBUTEROL SULFATE 3 ML: .5; 3 SOLUTION RESPIRATORY (INHALATION) at 11:27

## 2024-04-08 RX ADMIN — FOLIC ACID 1 MG: 1 TABLET ORAL at 09:03

## 2024-04-08 RX ADMIN — PREDNISONE 20 MG: 20 TABLET ORAL at 09:03

## 2024-04-08 RX ADMIN — FLUTICASONE PROPIONATE 2 SPRAY: 50 SPRAY, METERED NASAL at 09:06

## 2024-04-08 RX ADMIN — FAMOTIDINE 20 MG: 20 TABLET, FILM COATED ORAL at 09:03

## 2024-04-08 RX ADMIN — GUAIFENESIN 1200 MG: 600 TABLET, EXTENDED RELEASE ORAL at 09:03

## 2024-04-08 RX ADMIN — ATORVASTATIN CALCIUM 80 MG: 80 TABLET, FILM COATED ORAL at 09:02

## 2024-04-08 RX ADMIN — BUDESONIDE AND FORMOTEROL FUMARATE DIHYDRATE 2 PUFF: 160; 4.5 AEROSOL RESPIRATORY (INHALATION) at 07:58

## 2024-04-08 RX ADMIN — IPRATROPIUM BROMIDE AND ALBUTEROL SULFATE 3 ML: .5; 3 SOLUTION RESPIRATORY (INHALATION) at 07:56

## 2024-04-08 RX ADMIN — METOPROLOL TARTRATE 25 MG: 25 TABLET, FILM COATED ORAL at 09:03

## 2024-04-08 RX ADMIN — SACUBITRIL AND VALSARTAN 1 TABLET: 24; 26 TABLET, FILM COATED ORAL at 09:03

## 2024-04-08 RX ADMIN — APIXABAN 5 MG: 5 TABLET, FILM COATED ORAL at 09:03

## 2024-04-08 NOTE — CASE MANAGEMENT/SOCIAL WORK
Case Management Discharge Note      Final Note: Return home with family assist. Denies needs         Selected Continued Care - Admitted Since 4/4/2024       Destination    No services have been selected for the patient.                Durable Medical Equipment    No services have been selected for the patient.                Dialysis/Infusion    No services have been selected for the patient.                Home Medical Care    No services have been selected for the patient.                Therapy    No services have been selected for the patient.                Community Resources    No services have been selected for the patient.                Community & DME    No services have been selected for the patient.                    Transportation Services  Private: Car    Final Discharge Disposition Code: 01 - home or self-care

## 2024-04-08 NOTE — PROGRESS NOTES
"Enter Query Response Below      Query Response: Unable to clinically determine             If applicable, please update the problem list.       Patient: Wisam Malone        : 1945  Account: [id number]           Admit Date:         How to Respond to this query:       a. Click New Note     b. Answer query within the yellow box.                c. Update the Problem List, if applicable.      ,    78 year old male with Chronic hypoxic/hypercapnic Respiratory failure (home 2-4L NC), CAD HTN, Atrial fibrillation, Ischemic Cardiomyopathy admitted  for Acute on Chronic respiratory failure, COPD exacerbation and LLL Pneumonia.  Patient was treated with Cefepime (-) He is being discharged on Augmentin for 2 more days.  Discharge summary includes \"Pneumonia, unspecified organism.\"    Please clarify the type of pneumonia the patient was treated/monitored for:    - Gram negative pneumonia (excluding Haemophilus influenzae)  - Bacterial pneumonia unspecified  - Pneumonia, unspecified  - Other- specify______  - Unable to determine      By submitting this query, we are merely seeking further clarification of documentation to accurately reflect all conditions that you are monitoring, evaluating, treating or that extend the hospitalization or utilize additional resources of care. Please utilize your independent clinical judgment when addressing the question(s) above.     This query and your response, once completed, will be entered into the legal medical record.    Sincerely,  Abraham Viera RN CDIS  Clinical Documentation Integrity Program   [email or web address]  "

## 2024-04-08 NOTE — DISCHARGE SUMMARY
Patient Name: Wisam Malone  : 1945  MRN: [id number]    Date of Admission: 2024  Date of Discharge:  2024  Primary Care Physician: System, Provider Not In      Discharge Diagnoses     Active Hospital Problems    Diagnosis  POA    **COPD exacerbation [J44.1]  Yes    Pneumonia, unspecified organism [J18.9]  Yes    Prediabetes [R73.03]  Yes    Chronic respiratory failure with hypoxia and hypercapnia [J96.11, J96.12]  Yes    Left lower lobe pneumonia [J18.9]  Yes    Paroxysmal atrial fibrillation [I48.0]  Yes    Ischemic cardiomyopathy [I25.5]  Yes    Hypertension [I10]  Yes    Atherosclerotic coronary vascular disease [I25.10]  Yes      Resolved Hospital Problems   No resolved problems to display.        Hospital Course     Brief admission history and physical.  Please refer to the H&P for full details.  A pleasant 78 years old gentleman with a past history of COPD/chronic hypoxemic and hypercapnic respiratory failure and trilogy/coronary artery disease/hypertension/A-fib/cardiomyopathy ejection fraction of 36 to 40%/GERD/dyslipidemia/chronic disease anemia who presented with 1 days history of progressive shortness of breath and cough and old lower extremity edema.  His physical examination was remarkable for mild respiratory distress/mildly rapid atrial fibrillation and grade 2 systolic murmur/poor bilateral air entry with coarse bilateral rhonchi/trace bilateral lower extremity edema.  Hospital course.  Initial ER evaluation included a CBC that was normal.  Procalcitonin was normal.  Magnesium was normal.  Troponin was elevated at 33.  The proBNP was normal.  INR and PTT were normal.  CMP normal except random blood sugar of 143, sodium 133, chloride 97, alkaline phosphatase 145.  Arterial blood gas revealed a pH of 7.37, pCO2 of 45, pO2 of 57 on room air.  Respiratory PCR panel was negative.  Repeat troponin was elevated at 38 with +5 delta.  Streptococcus and Legionella urine antigen were  negative.  Lactic acid was negative.  Chest x-ray with left basilar infiltrate.  Patient was admitted with an acute on chronic hypoxemic and hypercapnic respiratory failure secondary to left lower lobe pneumonia and COPD exacerbation in a patient with a history of COPD and chronic hypoxemic and hypercapnic respiratory failure on trilogy.  He was placed on NIPPV and IV steroids/DuoNebs/cefepime/Symbicort/Mucinex/incentive spirometry.  Blood cultures obtained and came back negative.  Pulmonary was consulted.  Patient improved during the hospital stay and his pulse ox on room air was 98% at the time of discharge with great improvement of his dyspnea and cough.  He was switched to p.o. Augmentin to complete a total of 7 days of antibiotic.  He was tapered off IV steroids and started on p.o. steroids to be care.  As an outpatient.  He does have a history of coronary artery disease/hypertension/A-fib/cardiomyopathy/left ventricular thrombus his last echo was on 11/20/2023 revealing an ejection fraction of 36 to 40% and grade 1 diastolic dysfunction and ventricular thrombus.  His blood pressure initially remained under good control but had periods of low blood pressure.  His metoprolol was decreased.  His rate was initially elevated but has become controlled prior to discharge.  Blood pressure was normal by the time of discharge.  He was maintained on aspirin/Eliquis/Lipitor/Lopressor/Entresto.  There was no clinical evidence of angina or congestive heart failure.  As far as his GERD he remained on Pepcid with benign GI examination.  He remained on Lipitor for his dyslipidemia.  Noted to have elevation of sugar and A1c was elevated at 5.9 indicating prediabetes and he was counseled about diabetic diet at the time of discharge.  Noted to have anemia of chronic disease and he remained at baseline with a hemoglobin between 11.8 and 14.  At the time of discharge he was hemodynamically stable.  He will continue his antibiotic  and steroid until done.  He will continue Spiriva/Symbicort/Mucinex/as needed Combivent and Trelegy at home.    Consultants     Consult Orders (all) (From admission, onward)       Start     Ordered    04/04/24 1111  Inpatient Pulmonology Consult  Once        Specialty:  Pulmonary Disease  Provider:  Rob Marquez MD    04/04/24 1111    04/04/24 0411  [facility] (on-call MD unless specified) Details  Once        Specialty:  Hospitalist  Provider:  (Not yet assigned)    04/04/24 0410    04/04/24 0400  Pulmonology (on-call MD unless specified)  Once        Specialty:  Pulmonary Disease  Provider:  Rob Marquez MD    04/04/24 0359                  Procedures     Imaging Results (All)       Procedure Component Value Units Date/Time    XR Chest 1 View [[id number]] Collected: 04/04/24 0225     Updated: 04/04/24 0229    Narrative:      SINGLE VIEW OF THE CHEST     HISTORY: Increasing shortness of breath     COMPARISON: March 3, 2024     FINDINGS:  Heart size is within normal limits. Patient is status post median  sternotomy with coronary artery bypass grafting. No pneumothorax or  pleural effusion is seen. There are background changes of COPD. Patient  is noted to have an infiltrate at the left lung base, suspicious for  pneumonia.       Impression:      Left basilar infiltrate, suspicious for pneumonia.     This report was finalized on 4/4/2024 2:26 AM by Dr. Rosa Sanders M.D on Workstation: [id number]               Pertinent Labs     Results from last 7 days   Lab Units 04/08/24  0255 04/07/24  0342 04/06/24  0443 04/05/24  0442   WBC 10*3/mm3 9.69 8.76 8.95 8.12   HEMOGLOBIN g/dL 12.0* 12.1* 13.1 11.0*   PLATELETS 10*3/mm3 257 222 261 252     Results from last 7 days   Lab Units 04/08/24  0255 04/07/24  0342 04/06/24  0443 04/06/24  0027 04/05/24  0442   SODIUM mmol/L 136 134* 136  --  137   POTASSIUM mmol/L 4.5 4.4 4.6 4.7 4.1   CHLORIDE mmol/L 103 103 102  --  104   CO2 mmol/L 23.7 21.0* 24.7  --  24.0   BUN mg/dL  "23 24* 27*  --  30*   CREATININE mg/dL 0.77 0.81 0.87  --  1.16   GLUCOSE mg/dL 120* 120* 88  --  100*   Estimated Creatinine Clearance: 74.8 mL/min (by C-G formula based on SCr of 0.77 mg/dL).  Results from last 7 days   Lab Units 04/04/24  0213   ALBUMIN g/dL 3.8   BILIRUBIN mg/dL 0.5   ALK PHOS U/L 145*   AST (SGOT) U/L 23   ALT (SGPT) U/L 40     Results from last 7 days   Lab Units 04/08/24  0255 04/07/24  0342 04/06/24  0443 04/06/24  0027 04/05/24  0442 04/04/24  0918 04/04/24  0213   CALCIUM mg/dL 8.4* 8.2* 8.9  --  8.4*   < > 9.0   ALBUMIN g/dL  --   --   --   --   --   --  3.8   MAGNESIUM mg/dL  --   --   --  2.0  --   --  2.0    < > = values in this interval not displayed.       Results from last 7 days   Lab Units 04/04/24  0328 04/04/24  0213   HSTROP T ng/L 38* 33*   PROBNP pg/mL  --  1,065.0           Invalid input(s): \"LDLCALC\"  Results from last 7 days   Lab Units 04/04/24  0328   BLOODCX  No growth at 4 days  No growth at 4 days     Imaging Results (Last 24 Hours)       ** No results found for the last 24 hours. **            Test Results Pending at Discharge     Pending Labs       Order Current Status    Blood Culture - Blood, Arm, Left Preliminary result    Blood Culture - Blood, Arm, Right Preliminary result              Discharge Exam   Physical Exam  Vitals.  Temperature 98.2 a pulse of 79 respirate rate of 24 and blood pressure 106/55 and O2 sats of 96% on room air  General.  Elderly gentleman.  He is alert and oriented x 4.  No respiratory distress.  Normal mood and affect.  No diaphoresis.  Currently on room air.      Eyes.  Pupils equal round and reactive.  Intact extraocular musculature.  No pallor or jaundice.  Oral cavity.  Moist mucous membrane.  Neck.  Supple.  No JVD.  No lymphadenopathy or thyromegaly.  Cardiovascular.  Regular rate and rhythm with occasional ectopic beats and grade 2 systolic murmur.    Chest.  Poor bilateral air entry with no added sounds.  Air entry continues to " improve.    Abdomen.  Soft lax.  No tenderness.  No organomegaly.  No guarding or rebound  Extremities.  No clubbing/cyanosis/edema.    CNS.  No acute focal neurological deficits.  Discharge Details        Discharge Medications        New Medications        Instructions Start Date   amoxicillin-clavulanate 875-125 MG per tablet  Commonly known as: AUGMENTIN   1 tablet, Oral, 2 Times Daily             Changes to Medications        Instructions Start Date   metoprolol tartrate 25 MG tablet  Commonly known as: LOPRESSOR  What changed:   medication strength  how much to take   25 mg, Oral, 2 Times Daily      predniSONE 20 MG tablet  Commonly known as: DELTASONE  What changed:   medication strength  how much to take  when to take this   20 mg, Oral, Daily With Breakfast             Continue These Medications        Instructions Start Date   albuterol sulfate  (90 Base) MCG/ACT inhaler  Commonly known as: PROVENTIL HFA;VENTOLIN HFA;PROAIR HFA   2 puffs, Inhalation, Every 4 Hours PRN      apixaban 5 MG tablet tablet  Commonly known as: ELIQUIS   5 mg, Oral, Every 12 Hours Scheduled      aspirin 81 MG EC tablet   81 mg, Oral, Daily      atorvastatin 80 MG tablet  Commonly known as: LIPITOR   80 mg, Oral, Daily      budesonide-formoterol 160-4.5 MCG/ACT inhaler  Commonly known as: SYMBICORT   2 puffs, Inhalation, 2 Times Daily - RT      famotidine 20 MG tablet  Commonly known as: PEPCID   20 mg, Oral, 2 Times Daily      fluticasone 50 MCG/ACT nasal spray  Commonly known as: FLONASE   2 sprays, Nasal, Daily      folic acid 1 MG tablet  Commonly known as: FOLVITE   1 mg, Oral, Daily      guaiFENesin 600 MG 12 hr tablet  Commonly known as: MUCINEX   1,200 mg, Oral, 2 Times Daily      polyethylene glycol 17 g packet  Commonly known as: MIRALAX   17 g, Oral, Daily      sacubitril-valsartan 24-26 MG tablet  Commonly known as: ENTRESTO   1 tablet, Oral, 2 Times Daily      tiotropium bromide monohydrate 2.5 MCG/ACT aerosol  solution inhaler  Commonly known as: SPIRIVA RESPIMAT   2 puffs, Inhalation, Daily - RT      vitamin B-12 500 MCG tablet  Commonly known as: CYANOCOBALAMIN   1,000 mcg, Oral, Daily      vitamin D 1.25 MG (44626 UT) capsule capsule  Commonly known as: ERGOCALCIFEROL   50,000 Units, Oral, Weekly             Stop These Medications      dextromethorphan-guaifenesin  MG/5ML syrup  Commonly known as: ROBITUSSIN-DM     ipratropium-albuterol  MCG/ACT inhaler  Commonly known as: COMBIVENT RESPIMAT              No Known Allergies      Discharge Disposition:  Condition: Stable    Diet:   Diet Order   Procedures    Diet: Cardiac; Healthy Heart (2-3 Na+); Fluid Consistency: Thin (IDDSI 0)       Activity:   Activity Instructions       Activity as Tolerated              Counseling : No nonsteroidal anti-inflammatory medication    CODE STATUS:    Code Status and Medical Interventions:   Ordered at: 04/04/24 0435     Code Status (Patient has no pulse and is not breathing):    CPR (Attempt to Resuscitate)     Medical Interventions (Patient has pulse or is breathing):    Full Support       No future appointments.  Additional Instructions for the Follow-ups that You Need to Schedule       Call MD With Problems / Concerns   As directed      Instructions: Call MD or return for chest pain/palpitations/relapse of shortness of breath/cough/fever chills/hemoptysis/ankle edema/nausea or vomiting    Order Comments: Instructions: Call MD or return for chest pain/palpitations/relapse of shortness of breath/cough/fever chills/hemoptysis/ankle edema/nausea or vomiting         Discharge Follow-up with PCP   As directed       Currently Documented PCP:    System, Provider Not In    PCP Phone Number:    None     Follow Up Details: Primary MD.  1 week.  Acute on chronic hypoxemic and hypercapnic respiratory failure/pneumonia/COPD exacerbation/GERD/coronary artery disease/hypertension/A-fib/left ventricular  thrombus/dyslipidemia/prediabetes/anemia chronic disease        Discharge Follow-up with Specified Provider: Cardiology.  As scheduled.  CAD/HTN/A-fib/LV thrombus   As directed      To: Cardiology.  As scheduled.  CAD/HTN/A-fib/LV thrombus        Discharge Follow-up with Specified Provider: Pulmonary; 2 Weeks   As directed      To: Pulmonary   Follow Up: 2 Weeks   Follow Up Details: Chronic hypoxemic and hypercapnic respiratory failure/COPD on AVAPS               Follow-up Information       System, Provider Not In .    Why: Primary MD.  1 week.  Acute on chronic hypoxemic and hypercapnic respiratory failure/pneumonia/COPD exacerbation/GERD/coronary artery disease/hypertension/A-fib/left ventricular thrombus/dyslipidemia/prediabetes/anemia chronic disease  Contact information:  [facility] [id number]                               Time Spent on Discharge:  Greater than 30 minutes      Maryanne Dover MD  [facility]  04/08/24  08:47 EDT

## 2024-04-08 NOTE — PLAN OF CARE
Goal Outcome Evaluation:  Plan of Care Reviewed With: patient        Progress: improving  Outcome Evaluation: VSS. BiPAP all shift. No c/o soa or pain. Blood sugar at hs of 195. 2 units ssi given. IV cefepime continued. Voiding via urinal. No BM this shift. Possible d/c today.

## 2024-04-08 NOTE — PLAN OF CARE
Goal Outcome Evaluation:         Pt to d/c after last dose of abx. Family to transport.

## 2024-04-09 LAB
BACTERIA SPEC AEROBE CULT: NORMAL
BACTERIA SPEC AEROBE CULT: NORMAL

## 2024-04-09 NOTE — OUTREACH NOTE
Prep Survey      Flowsheet Row Responses   [facility] facility patient discharged from? [facility]   Is LACE score < 7 ? No   Eligibility Readm Mgmt   Discharge diagnosis COPD exac   Does the patient have one of the following disease processes/diagnoses(primary or secondary)? COPD   Does the patient have Home health ordered? No   Is there a DME ordered? No   Prep survey completed? Yes            JUN URIBE - Registered Nurse

## 2024-04-11 ENCOUNTER — READMISSION MANAGEMENT (OUTPATIENT)
Dept: CALL CENTER | Facility: HOSPITAL | Age: 79
End: 2024-04-11
Payer: MEDICARE

## 2024-04-11 NOTE — OUTREACH NOTE
COPD/PN Week 1 Survey      Flowsheet Row Responses   [facility] facility patient discharged from? [place]   Does the patient have one of the following disease processes/diagnoses(primary or secondary)? COPD   Week 1 attempt successful? No   Unsuccessful attempts Attempt 1            Antonieta Garcia Registered Nurse

## 2024-04-16 ENCOUNTER — READMISSION MANAGEMENT (OUTPATIENT)
Dept: CALL CENTER | Facility: HOSPITAL | Age: 79
End: 2024-04-16
Payer: MEDICARE

## 2024-04-25 ENCOUNTER — READMISSION MANAGEMENT (OUTPATIENT)
Dept: CALL CENTER | Facility: HOSPITAL | Age: 79
End: 2024-04-25
Payer: MEDICARE

## 2024-04-25 NOTE — OUTREACH NOTE
COPD/PN Week 2 Survey      Flowsheet Row Responses   [facility] facility patient discharged from? [place]   Does the patient have one of the following disease processes/diagnoses(primary or secondary)? COPD   Week 2 attempt successful? No   Unsuccessful attempts Attempt 1  [All numbers listed were attempted-no answer]            Aminata H - Registered Nurse

## 2024-04-30 ENCOUNTER — READMISSION MANAGEMENT (OUTPATIENT)
Dept: CALL CENTER | Facility: HOSPITAL | Age: 79
End: 2024-04-30
Payer: MEDICARE

## 2024-04-30 NOTE — OUTREACH NOTE
COPD/PN Week 2 Survey      Flowsheet Row Responses   [facility] facility patient discharged from? [place]   Does the patient have one of the following disease processes/diagnoses(primary or secondary)? COPD   Week 2 attempt successful? No   Unsuccessful attempts Attempt 2   Revoke Corine LINARES - Registered Nurse

## 2025-04-10 ENCOUNTER — APPOINTMENT (OUTPATIENT)
Dept: GENERAL RADIOLOGY | Facility: HOSPITAL | Age: 80
End: 2025-04-10
Payer: OTHER GOVERNMENT

## 2025-04-10 ENCOUNTER — HOSPITAL ENCOUNTER (INPATIENT)
Facility: HOSPITAL | Age: 80
LOS: 6 days | Discharge: HOME | End: 2025-04-16
Attending: EMERGENCY MEDICINE | Admitting: STUDENT IN AN ORGANIZED HEALTH CARE EDUCATION/TRAINING PROGRAM
Payer: MEDICARE

## 2025-04-10 DIAGNOSIS — J96.01 ACUTE RESPIRATORY FAILURE WITH HYPOXIA AND HYPERCAPNIA: Primary | ICD-10-CM

## 2025-04-10 DIAGNOSIS — J96.02 ACUTE RESPIRATORY FAILURE WITH HYPOXIA AND HYPERCAPNIA: Primary | ICD-10-CM

## 2025-04-10 DIAGNOSIS — U07.1 COVID-19: ICD-10-CM

## 2025-04-10 LAB
ALBUMIN SERPL-MCNC: 3.9 G/DL (ref 3.5–5.2)
ALBUMIN/GLOB SERPL: 1.2 G/DL
ALP SERPL-CCNC: 157 U/L (ref 39–117)
ALT SERPL W P-5'-P-CCNC: 29 U/L (ref 1–41)
ANION GAP SERPL CALCULATED.3IONS-SCNC: 8.2 MMOL/L (ref 5–15)
ARTERIAL PATENCY WRIST A: POSITIVE
ARTERIAL PATENCY WRIST A: POSITIVE
AST SERPL-CCNC: 25 U/L (ref 1–40)
ATMOSPHERIC PRESS: 748.2 MMHG
ATMOSPHERIC PRESS: 749.1 MMHG
B PARAPERT DNA SPEC QL NAA+PROBE: NOT DETECTED
B PERT DNA SPEC QL NAA+PROBE: NOT DETECTED
BASE EXCESS BLDA CALC-SCNC: 3.9 MMOL/L (ref 0–2)
BASE EXCESS BLDA CALC-SCNC: 4.4 MMOL/L (ref 0–2)
BASOPHILS # BLD AUTO: 0.07 10*3/MM3 (ref 0–0.2)
BASOPHILS NFR BLD AUTO: 0.5 % (ref 0–1.5)
BDY SITE: ABNORMAL
BDY SITE: ABNORMAL
BILIRUB SERPL-MCNC: 0.5 MG/DL (ref 0–1.2)
BUN SERPL-MCNC: 20 MG/DL (ref 8–23)
BUN/CREAT SERPL: 18.9 (ref 7–25)
C PNEUM DNA NPH QL NAA+NON-PROBE: NOT DETECTED
CALCIUM SPEC-SCNC: 9 MG/DL (ref 8.6–10.5)
CHLORIDE SERPL-SCNC: 100 MMOL/L (ref 98–107)
CO2 SERPL-SCNC: 30.8 MMOL/L (ref 22–29)
CREAT SERPL-MCNC: 1.06 MG/DL (ref 0.76–1.27)
D-LACTATE SERPL-SCNC: 1.4 MMOL/L (ref 0.5–2)
DEPRECATED RDW RBC AUTO: 40.4 FL (ref 37–54)
DEVICE COMMENT: ABNORMAL
DEVICE COMMENT: ABNORMAL
EGFRCR SERPLBLD CKD-EPI 2021: 70.9 ML/MIN/1.73
EOSINOPHIL # BLD AUTO: 0.08 10*3/MM3 (ref 0–0.4)
EOSINOPHIL NFR BLD AUTO: 0.6 % (ref 0.3–6.2)
ERYTHROCYTE [DISTWIDTH] IN BLOOD BY AUTOMATED COUNT: 12.1 % (ref 12.3–15.4)
ETHANOL BLD-MCNC: <10 MG/DL (ref 0–10)
ETHANOL UR QL: <0.01 %
FLUAV SUBTYP SPEC NAA+PROBE: NOT DETECTED
FLUBV RNA ISLT QL NAA+PROBE: NOT DETECTED
GEN 5 1HR TROPONIN T REFLEX: 32 NG/L
GLOBULIN UR ELPH-MCNC: 3.2 GM/DL
GLUCOSE SERPL-MCNC: 131 MG/DL (ref 65–99)
HADV DNA SPEC NAA+PROBE: NOT DETECTED
HCO3 BLDA-SCNC: 30.1 MMOL/L (ref 22–28)
HCO3 BLDA-SCNC: 33.4 MMOL/L (ref 22–28)
HCOV 229E RNA SPEC QL NAA+PROBE: NOT DETECTED
HCOV HKU1 RNA SPEC QL NAA+PROBE: NOT DETECTED
HCOV NL63 RNA SPEC QL NAA+PROBE: NOT DETECTED
HCOV OC43 RNA SPEC QL NAA+PROBE: NOT DETECTED
HCT VFR BLD AUTO: 43.4 % (ref 37.5–51)
HEMODILUTION: NO
HEMODILUTION: NO
HGB BLD-MCNC: 13.7 G/DL (ref 13–17.7)
HMPV RNA NPH QL NAA+NON-PROBE: NOT DETECTED
HPIV1 RNA ISLT QL NAA+PROBE: NOT DETECTED
HPIV2 RNA SPEC QL NAA+PROBE: NOT DETECTED
HPIV3 RNA NPH QL NAA+PROBE: NOT DETECTED
HPIV4 P GENE NPH QL NAA+PROBE: NOT DETECTED
IMM GRANULOCYTES # BLD AUTO: 0.07 10*3/MM3 (ref 0–0.05)
IMM GRANULOCYTES NFR BLD AUTO: 0.5 % (ref 0–0.5)
INHALED O2 CONCENTRATION: 28 %
INHALED O2 CONCENTRATION: 50 %
INR PPP: 1.35 (ref 0.9–1.1)
INSPIRATORY TIME: 1
INSPIRATORY TIME: 1
LYMPHOCYTES # BLD AUTO: 1.58 10*3/MM3 (ref 0.7–3.1)
LYMPHOCYTES NFR BLD AUTO: 12.2 % (ref 19.6–45.3)
Lab: ABNORMAL
M PNEUMO IGG SER IA-ACNC: NOT DETECTED
MAGNESIUM SERPL-MCNC: 2.3 MG/DL (ref 1.6–2.4)
MCH RBC QN AUTO: 28.6 PG (ref 26.6–33)
MCHC RBC AUTO-ENTMCNC: 31.6 G/DL (ref 31.5–35.7)
MCV RBC AUTO: 90.6 FL (ref 79–97)
MODALITY: ABNORMAL
MODALITY: ABNORMAL
MONOCYTES # BLD AUTO: 1.19 10*3/MM3 (ref 0.1–0.9)
MONOCYTES NFR BLD AUTO: 9.2 % (ref 5–12)
NEUTROPHILS NFR BLD AUTO: 77 % (ref 42.7–76)
NEUTROPHILS NFR BLD AUTO: 9.92 10*3/MM3 (ref 1.7–7)
NOTIFIED WHO: ABNORMAL
NRBC BLD AUTO-RTO: 0 /100 WBC (ref 0–0.2)
NT-PROBNP SERPL-MCNC: 1824 PG/ML (ref 0–1800)
O2 A-A PPRESDIFF RESPIRATORY: 0.5 MMHG
O2 A-A PPRESDIFF RESPIRATORY: 0.9 MMHG
PCO2 BLDA: 51.1 MM HG (ref 35–45)
PCO2 BLDA: 67 MM HG (ref 35–45)
PEEP RESPIRATORY: 5 CM[H2O]
PEEP RESPIRATORY: 5 CM[H2O]
PH BLDA: 7.3 PH UNITS (ref 7.35–7.45)
PH BLDA: 7.38 PH UNITS (ref 7.35–7.45)
PHOSPHATE SERPL-MCNC: 4 MG/DL (ref 2.5–4.5)
PLATELET # BLD AUTO: 257 10*3/MM3 (ref 140–450)
PMV BLD AUTO: 9 FL (ref 6–12)
PO2 BLD: 273 MM[HG] (ref 0–500)
PO2 BLD: 498 MM[HG] (ref 0–500)
PO2 BLDA: 248.8 MM HG (ref 80–100)
PO2 BLDA: 76.5 MM HG (ref 80–100)
POTASSIUM SERPL-SCNC: 4.3 MMOL/L (ref 3.5–5.2)
PROCALCITONIN SERPL-MCNC: 0.06 NG/ML (ref 0–0.25)
PROCALCITONIN SERPL-MCNC: 0.07 NG/ML (ref 0–0.25)
PROT SERPL-MCNC: 7.1 G/DL (ref 6–8.5)
PROTHROMBIN TIME: 16.7 SECONDS (ref 11.7–14.2)
QT INTERVAL: 374 MS
QTC INTERVAL: 469 MS
RBC # BLD AUTO: 4.79 10*6/MM3 (ref 4.14–5.8)
READ BACK: YES
RHINOVIRUS RNA SPEC NAA+PROBE: NOT DETECTED
RSV RNA NPH QL NAA+NON-PROBE: NOT DETECTED
SAO2 % BLDCOA: 94.6 % (ref 92–98.5)
SAO2 % BLDCOA: 99.8 % (ref 92–98.5)
SARS-COV-2 RNA NPH QL NAA+NON-PROBE: DETECTED
SET MECH RESP RATE: 20
SET MECH RESP RATE: 20
SODIUM SERPL-SCNC: 139 MMOL/L (ref 136–145)
TOTAL RATE: 31 BREATHS/MINUTE
TOTAL RATE: 32 BREATHS/MINUTE
TROPONIN T % DELTA: 0
TROPONIN T NUMERIC DELTA: 0 NG/L
TROPONIN T SERPL HS-MCNC: 32 NG/L
VENTILATOR MODE: ABNORMAL
VENTILATOR MODE: ABNORMAL
WBC NRBC COR # BLD AUTO: 12.91 10*3/MM3 (ref 3.4–10.8)

## 2025-04-10 PROCEDURE — 82077 ASSAY SPEC XCP UR&BREATH IA: CPT | Performed by: EMERGENCY MEDICINE

## 2025-04-10 PROCEDURE — 94799 UNLISTED PULMONARY SVC/PX: CPT

## 2025-04-10 PROCEDURE — 25010000002 CEFTRIAXONE PER 250 MG: Performed by: EMERGENCY MEDICINE

## 2025-04-10 PROCEDURE — 87040 BLOOD CULTURE FOR BACTERIA: CPT | Performed by: EMERGENCY MEDICINE

## 2025-04-10 PROCEDURE — 80053 COMPREHEN METABOLIC PANEL: CPT | Performed by: EMERGENCY MEDICINE

## 2025-04-10 PROCEDURE — 94761 N-INVAS EAR/PLS OXIMETRY MLT: CPT

## 2025-04-10 PROCEDURE — 83735 ASSAY OF MAGNESIUM: CPT | Performed by: EMERGENCY MEDICINE

## 2025-04-10 PROCEDURE — 36600 WITHDRAWAL OF ARTERIAL BLOOD: CPT | Performed by: EMERGENCY MEDICINE

## 2025-04-10 PROCEDURE — 83880 ASSAY OF NATRIURETIC PEPTIDE: CPT | Performed by: EMERGENCY MEDICINE

## 2025-04-10 PROCEDURE — 93005 ELECTROCARDIOGRAM TRACING: CPT | Performed by: EMERGENCY MEDICINE

## 2025-04-10 PROCEDURE — 0202U NFCT DS 22 TRGT SARS-COV-2: CPT | Performed by: EMERGENCY MEDICINE

## 2025-04-10 PROCEDURE — 93010 ELECTROCARDIOGRAM REPORT: CPT | Performed by: INTERNAL MEDICINE

## 2025-04-10 PROCEDURE — 94664 DEMO&/EVAL PT USE INHALER: CPT

## 2025-04-10 PROCEDURE — 25010000002 DEXAMETHASONE PER 1 MG: Performed by: NURSE PRACTITIONER

## 2025-04-10 PROCEDURE — 84484 ASSAY OF TROPONIN QUANT: CPT | Performed by: EMERGENCY MEDICINE

## 2025-04-10 PROCEDURE — 85025 COMPLETE CBC W/AUTO DIFF WBC: CPT | Performed by: EMERGENCY MEDICINE

## 2025-04-10 PROCEDURE — 83605 ASSAY OF LACTIC ACID: CPT | Performed by: EMERGENCY MEDICINE

## 2025-04-10 PROCEDURE — 36600 WITHDRAWAL OF ARTERIAL BLOOD: CPT

## 2025-04-10 PROCEDURE — 71045 X-RAY EXAM CHEST 1 VIEW: CPT

## 2025-04-10 PROCEDURE — 94660 CPAP INITIATION&MGMT: CPT

## 2025-04-10 PROCEDURE — 82803 BLOOD GASES ANY COMBINATION: CPT

## 2025-04-10 PROCEDURE — 84145 PROCALCITONIN (PCT): CPT | Performed by: NURSE PRACTITIONER

## 2025-04-10 PROCEDURE — 82803 BLOOD GASES ANY COMBINATION: CPT | Performed by: EMERGENCY MEDICINE

## 2025-04-10 PROCEDURE — XW033E5 INTRODUCTION OF REMDESIVIR ANTI-INFECTIVE INTO PERIPHERAL VEIN, PERCUTANEOUS APPROACH, NEW TECHNOLOGY GROUP 5: ICD-10-PCS | Performed by: STUDENT IN AN ORGANIZED HEALTH CARE EDUCATION/TRAINING PROGRAM

## 2025-04-10 PROCEDURE — 99291 CRITICAL CARE FIRST HOUR: CPT

## 2025-04-10 PROCEDURE — 84100 ASSAY OF PHOSPHORUS: CPT | Performed by: EMERGENCY MEDICINE

## 2025-04-10 PROCEDURE — 85610 PROTHROMBIN TIME: CPT | Performed by: EMERGENCY MEDICINE

## 2025-04-10 PROCEDURE — 94640 AIRWAY INHALATION TREATMENT: CPT

## 2025-04-10 PROCEDURE — 36415 COLL VENOUS BLD VENIPUNCTURE: CPT | Performed by: EMERGENCY MEDICINE

## 2025-04-10 PROCEDURE — 84145 PROCALCITONIN (PCT): CPT | Performed by: EMERGENCY MEDICINE

## 2025-04-10 RX ORDER — AMOXICILLIN 250 MG
2 CAPSULE ORAL 2 TIMES DAILY PRN
Status: DISCONTINUED | OUTPATIENT
Start: 2025-04-10 | End: 2025-04-16 | Stop reason: HOSPADM

## 2025-04-10 RX ORDER — BISACODYL 10 MG
10 SUPPOSITORY, RECTAL RECTAL DAILY PRN
Status: DISCONTINUED | OUTPATIENT
Start: 2025-04-10 | End: 2025-04-16 | Stop reason: HOSPADM

## 2025-04-10 RX ORDER — ACETAMINOPHEN 650 MG/1
650 SUPPOSITORY RECTAL EVERY 4 HOURS PRN
Status: DISCONTINUED | OUTPATIENT
Start: 2025-04-10 | End: 2025-04-16 | Stop reason: HOSPADM

## 2025-04-10 RX ORDER — IPRATROPIUM BROMIDE AND ALBUTEROL SULFATE 2.5; .5 MG/3ML; MG/3ML
3 SOLUTION RESPIRATORY (INHALATION)
Status: DISCONTINUED | OUTPATIENT
Start: 2025-04-10 | End: 2025-04-16 | Stop reason: HOSPADM

## 2025-04-10 RX ORDER — ALUMINA, MAGNESIA, AND SIMETHICONE 2400; 2400; 240 MG/30ML; MG/30ML; MG/30ML
15 SUSPENSION ORAL EVERY 6 HOURS PRN
Status: DISCONTINUED | OUTPATIENT
Start: 2025-04-10 | End: 2025-04-16 | Stop reason: HOSPADM

## 2025-04-10 RX ORDER — NITROGLYCERIN 0.4 MG/1
0.4 TABLET SUBLINGUAL
Status: DISCONTINUED | OUTPATIENT
Start: 2025-04-10 | End: 2025-04-16 | Stop reason: HOSPADM

## 2025-04-10 RX ORDER — SODIUM CHLORIDE 0.9 % (FLUSH) 0.9 %
10 SYRINGE (ML) INJECTION AS NEEDED
Status: DISCONTINUED | OUTPATIENT
Start: 2025-04-10 | End: 2025-04-16 | Stop reason: HOSPADM

## 2025-04-10 RX ORDER — IPRATROPIUM BROMIDE AND ALBUTEROL SULFATE 2.5; .5 MG/3ML; MG/3ML
3 SOLUTION RESPIRATORY (INHALATION) ONCE
Status: COMPLETED | OUTPATIENT
Start: 2025-04-10 | End: 2025-04-10

## 2025-04-10 RX ORDER — ONDANSETRON 4 MG/1
4 TABLET, ORALLY DISINTEGRATING ORAL EVERY 6 HOURS PRN
Status: DISCONTINUED | OUTPATIENT
Start: 2025-04-10 | End: 2025-04-16 | Stop reason: HOSPADM

## 2025-04-10 RX ORDER — BISACODYL 5 MG/1
5 TABLET, DELAYED RELEASE ORAL DAILY PRN
Status: DISCONTINUED | OUTPATIENT
Start: 2025-04-10 | End: 2025-04-16 | Stop reason: HOSPADM

## 2025-04-10 RX ORDER — ALBUTEROL SULFATE 0.83 MG/ML
2.5 SOLUTION RESPIRATORY (INHALATION) ONCE
Status: COMPLETED | OUTPATIENT
Start: 2025-04-10 | End: 2025-04-10

## 2025-04-10 RX ORDER — POLYETHYLENE GLYCOL 3350 17 G/17G
17 POWDER, FOR SOLUTION ORAL DAILY PRN
Status: DISCONTINUED | OUTPATIENT
Start: 2025-04-10 | End: 2025-04-11

## 2025-04-10 RX ORDER — ACETAMINOPHEN 160 MG/5ML
650 SOLUTION ORAL EVERY 4 HOURS PRN
Status: DISCONTINUED | OUTPATIENT
Start: 2025-04-10 | End: 2025-04-16 | Stop reason: HOSPADM

## 2025-04-10 RX ORDER — DEXAMETHASONE SODIUM PHOSPHATE 10 MG/ML
6 INJECTION, SOLUTION INTRA-ARTICULAR; INTRALESIONAL; INTRAMUSCULAR; INTRAVENOUS; SOFT TISSUE DAILY
Status: DISCONTINUED | OUTPATIENT
Start: 2025-04-10 | End: 2025-04-12

## 2025-04-10 RX ORDER — ONDANSETRON 2 MG/ML
4 INJECTION INTRAMUSCULAR; INTRAVENOUS EVERY 6 HOURS PRN
Status: DISCONTINUED | OUTPATIENT
Start: 2025-04-10 | End: 2025-04-16 | Stop reason: HOSPADM

## 2025-04-10 RX ORDER — ACETAMINOPHEN 325 MG/1
650 TABLET ORAL EVERY 4 HOURS PRN
Status: DISCONTINUED | OUTPATIENT
Start: 2025-04-10 | End: 2025-04-16 | Stop reason: HOSPADM

## 2025-04-10 RX ADMIN — IPRATROPIUM BROMIDE AND ALBUTEROL SULFATE 3 ML: .5; 3 SOLUTION RESPIRATORY (INHALATION) at 16:07

## 2025-04-10 RX ADMIN — DEXAMETHASONE SODIUM PHOSPHATE 6 MG: 10 INJECTION INTRAMUSCULAR; INTRAVENOUS at 22:03

## 2025-04-10 RX ADMIN — IPRATROPIUM BROMIDE AND ALBUTEROL SULFATE 3 ML: .5; 3 SOLUTION RESPIRATORY (INHALATION) at 23:32

## 2025-04-10 RX ADMIN — ALBUTEROL SULFATE 2.5 MG: 2.5 SOLUTION RESPIRATORY (INHALATION) at 16:57

## 2025-04-10 RX ADMIN — CEFTRIAXONE 2000 MG: 2 INJECTION, POWDER, FOR SOLUTION INTRAMUSCULAR; INTRAVENOUS at 17:16

## 2025-04-10 RX ADMIN — IPRATROPIUM BROMIDE AND ALBUTEROL SULFATE 3 ML: .5; 3 SOLUTION RESPIRATORY (INHALATION) at 20:13

## 2025-04-10 NOTE — ED PROVIDER NOTES
EMERGENCY DEPARTMENT ENCOUNTER    Room Number:  21/21  PCP: System, Provider Not In  Independent Historians: EMS    HPI:  Chief Complaint: short of breath    A complete HPI/ROS/PMH/PSH/SH/FH are unobtainable due to: Acutely ill and not able to provide history    Chronic or social conditions impacting patient care (Social Determinants of Health): None      Context: Wisam Malone is a 79 y.o. male with a medical history of copd and in ? hospice care not normally requiring [facility] who presents to the ED c/o acute shortness of breath that worsened today. Pt 70% ra upon fire dept arrival and they placed patient on cpap. Pt with productive cough X a few days but got worse today.  No fever, no chest pain, no n/v/d.        Review of prior external notes (non-ED) -and- Review of prior external test results outside of this encounter: Pt last admitted here for his copd in 04/2024.  Pt had pneumonia at that time. I reviewed his dc summary.  Pt also with ischemic cardiomyopathy, htn, lll pna, and afib    Prescription drug monitoring program review:     N/A    PAST MEDICAL HISTORY  Active Ambulatory Problems     Diagnosis Date Noted    COPD exacerbation 03/08/2024    Atherosclerotic coronary vascular disease 03/08/2024    Hypertension 03/08/2024    Weight loss 03/08/2024    Ischemic cardiomyopathy 03/08/2024    Paroxysmal atrial fibrillation 03/08/2024    LV (left ventricular) mural thrombus 03/09/2024    Chronic respiratory failure with hypoxia and hypercapnia 04/04/2024    Left lower lobe pneumonia 04/04/2024    Prediabetes 04/05/2024    Pneumonia, unspecified organism 04/08/2024     Resolved Ambulatory Problems     Diagnosis Date Noted    Septic shock 11/17/2023     Past Medical History:   Diagnosis Date    COPD (chronic obstructive pulmonary disease)     Elevated cholesterol     GERD (gastroesophageal reflux disease)          PAST SURGICAL HISTORY  Past Surgical History:   Procedure Laterality Date    CORONARY  ARTERY BYPASS GRAFT  1996         FAMILY HISTORY  No family history on file.      SOCIAL HISTORY  Social History     Socioeconomic History    Marital status:    Tobacco Use    Smoking status: Former     Average packs/day: 1 pack/day for 62.0 years (62.0 ttl pk-yrs)     Types: Cigarettes     Start date: 1960    Smokeless tobacco: Never   Vaping Use    Vaping status: Never Used   Substance and Sexual Activity    Alcohol use: Never    Drug use: Never    Sexual activity: Yes     Partners: Female         ALLERGIES  Patient has no known allergies.        REVIEW OF SYSTEMS  Review of Systems  Included in HPI  All systems reviewed and negative except for those discussed in HPI.      PHYSICAL EXAM    I have reviewed the triage vital signs and nursing notes.    ED Triage Vitals   Temp Pulse Resp BP SpO2   -- -- -- -- --      Temp src Heart Rate Source Patient Position BP Location FiO2 (%)   -- -- -- -- --       Physical Exam  GENERAL: tachypneic on ems cpap initially, alert  SKIN: Warm, dry  HENT: Normocephalic, atraumatic  EYES: no scleral icterus  CV: regular rhythm, regular rate  RESPIRATORY: tachypneic with diminished bs bases and insp and exp wheezing  ABDOMEN: soft, nontender, nondistended  MUSCULOSKELETAL: no deformity, no le edema  NEURO: alert, moves all extremities, follows commands                                                                 LAB RESULTS  Recent Results (from the past 24 hours)   ECG 12 Lead Dyspnea    Collection Time: 04/10/25  3:51 PM   Result Value Ref Range    QT Interval 374 ms    QTC Interval 469 ms   Comprehensive Metabolic Panel    Collection Time: 04/10/25  3:58 PM    Specimen: Blood   Result Value Ref Range    Glucose 131 (H) 65 - 99 mg/dL    BUN 20 8 - 23 mg/dL    Creatinine 1.06 0.76 - 1.27 mg/dL    Sodium 139 136 - 145 mmol/L    Potassium 4.3 3.5 - 5.2 mmol/L    Chloride 100 98 - 107 mmol/L    CO2 30.8 (H) 22.0 - 29.0 mmol/L    Calcium 9.0 8.6 - 10.5 mg/dL    Total Protein 7.1  6.0 - 8.5 g/dL    Albumin 3.9 3.5 - 5.2 g/dL    ALT (SGPT) 29 1 - 41 U/L    AST (SGOT) 25 1 - 40 U/L    Alkaline Phosphatase 157 (H) 39 - 117 U/L    Total Bilirubin 0.5 0.0 - 1.2 mg/dL    Globulin 3.2 gm/dL    A/G Ratio 1.2 g/dL    BUN/Creatinine Ratio 18.9 7.0 - 25.0    Anion Gap 8.2 5.0 - 15.0 mmol/L    eGFR 70.9 >60.0 mL/min/1.73   Protime-INR    Collection Time: 04/10/25  3:58 PM    Specimen: Blood   Result Value Ref Range    Protime 16.7 (H) 11.7 - 14.2 Seconds    INR 1.35 (H) 0.90 - 1.10   BNP    Collection Time: 04/10/25  3:58 PM    Specimen: Blood   Result Value Ref Range    proBNP 1,824.0 (H) 0.0 - 1,800.0 pg/mL   High Sensitivity Troponin T    Collection Time: 04/10/25  3:58 PM    Specimen: Blood   Result Value Ref Range    HS Troponin T 32 (H) <22 ng/L   Lactic Acid, Plasma    Collection Time: 04/10/25  3:58 PM    Specimen: Blood   Result Value Ref Range    Lactate 1.4 0.5 - 2.0 mmol/L   Procalcitonin    Collection Time: 04/10/25  3:58 PM    Specimen: Blood   Result Value Ref Range    Procalcitonin 0.07 0.00 - 0.25 ng/mL   Ethanol    Collection Time: 04/10/25  3:58 PM    Specimen: Blood   Result Value Ref Range    Ethanol <10 0 - 10 mg/dL    Ethanol % <0.010 %   Magnesium    Collection Time: 04/10/25  3:58 PM    Specimen: Blood   Result Value Ref Range    Magnesium 2.3 1.6 - 2.4 mg/dL   Phosphorus    Collection Time: 04/10/25  3:58 PM    Specimen: Blood   Result Value Ref Range    Phosphorus 4.0 2.5 - 4.5 mg/dL   CBC Auto Differential    Collection Time: 04/10/25  3:58 PM    Specimen: Blood   Result Value Ref Range    WBC 12.91 (H) 3.40 - 10.80 10*3/mm3    RBC 4.79 4.14 - 5.80 10*6/mm3    Hemoglobin 13.7 13.0 - 17.7 g/dL    Hematocrit 43.4 37.5 - 51.0 %    MCV 90.6 79.0 - 97.0 fL    MCH 28.6 26.6 - 33.0 pg    MCHC 31.6 31.5 - 35.7 g/dL    RDW 12.1 (L) 12.3 - 15.4 %    RDW-SD 40.4 37.0 - 54.0 fl    MPV 9.0 6.0 - 12.0 fL    Platelets 257 140 - 450 10*3/mm3    Neutrophil % 77.0 (H) 42.7 - 76.0 %     Lymphocyte % 12.2 (L) 19.6 - 45.3 %    Monocyte % 9.2 5.0 - 12.0 %    Eosinophil % 0.6 0.3 - 6.2 %    Basophil % 0.5 0.0 - 1.5 %    Immature Grans % 0.5 0.0 - 0.5 %    Neutrophils, Absolute 9.92 (H) 1.70 - 7.00 10*3/mm3    Lymphocytes, Absolute 1.58 0.70 - 3.10 10*3/mm3    Monocytes, Absolute 1.19 (H) 0.10 - 0.90 10*3/mm3    Eosinophils, Absolute 0.08 0.00 - 0.40 10*3/mm3    Basophils, Absolute 0.07 0.00 - 0.20 10*3/mm3    Immature Grans, Absolute 0.07 (H) 0.00 - 0.05 10*3/mm3    nRBC 0.0 0.0 - 0.2 /100 WBC   Respiratory Panel PCR w/COVID-19(SARS-CoV-2) KUSHAL/JAMI/MENA/PAD/COR/GARY In-House, NP Swab in [facility], 2 HR TAT - Swab, Nasopharynx    Collection Time: 04/10/25  3:59 PM    Specimen: Nasopharynx; Swab   Result Value Ref Range    ADENOVIRUS, PCR Not Detected Not Detected    Coronavirus 229E Not Detected Not Detected    Coronavirus HKU1 Not Detected Not Detected    Coronavirus NL63 Not Detected Not Detected    Coronavirus OC43 Not Detected Not Detected    COVID19 Detected (C) Not Detected - Ref. Range    Human Metapneumovirus Not Detected Not Detected    Human Rhinovirus/Enterovirus Not Detected Not Detected    Influenza A PCR Not Detected Not Detected    Influenza B PCR Not Detected Not Detected    Parainfluenza Virus 1 Not Detected Not Detected    Parainfluenza Virus 2 Not Detected Not Detected    Parainfluenza Virus 3 Not Detected Not Detected    Parainfluenza Virus 4 Not Detected Not Detected    RSV, PCR Not Detected Not Detected    Bordetella pertussis pcr Not Detected Not Detected    Bordetella parapertussis PCR Not Detected Not Detected    Chlamydophila pneumoniae PCR Not Detected Not Detected    Mycoplasma pneumo by PCR Not Detected Not Detected   Blood Gas, Arterial -    Collection Time: 04/10/25  4:00 PM    Specimen: Arterial Blood   Result Value Ref Range    Site Left Radial     Paul's Test Positive     pH, Arterial 7.305 (L) 7.350 - 7.450 pH units    pCO2, Arterial 67.0 (C) 35.0 - 45.0 mm Hg    pO2,  Arterial 248.8 (H) 80.0 - 100.0 mm Hg    HCO3, Arterial 33.4 (H) 22.0 - 28.0 mmol/L    Base Excess, Arterial 4.4 (H) 0.0 - 2.0 mmol/L    O2 Saturation, Arterial 99.8 (H) 92.0 - 98.5 %    A-a DO2 0.9 mmHg    Barometric Pressure for Blood Gas 749.1000 mmHg    Modality BiPap     FIO2 50 %    Ventilator Mode NIV     Set [facility] Resp Rate 20     Rate 31 Breaths/minute    PEEP 5     Notified Who Dr Plummer     Read Back Yes     Notified Time      Hemodilution No     Inspiratory Time 1     Device Comment AVAPS 14-30 epap 5. target 550 returning 356     PO2/FIO2 498 0 - 500   High Sensitivity Troponin T 1Hr    Collection Time: 04/10/25  5:11 PM    Specimen: Arm, Left; Blood   Result Value Ref Range    HS Troponin T 32 (H) <22 ng/L    Troponin T Numeric Delta 0 ng/L    Troponin T % Delta 0 Abnormal if >/= 20%   Blood Gas, Arterial -    Collection Time: 04/10/25  5:56 PM    Specimen: Arterial Blood   Result Value Ref Range    Site Right Radial     Paul's Test Positive     pH, Arterial 7.379 7.350 - 7.450 pH units    pCO2, Arterial 51.1 (H) 35.0 - 45.0 mm Hg    pO2, Arterial 76.5 (L) 80.0 - 100.0 mm Hg    HCO3, Arterial 30.1 (H) 22.0 - 28.0 mmol/L    Base Excess, Arterial 3.9 (H) 0.0 - 2.0 mmol/L    O2 Saturation, Arterial 94.6 92.0 - 98.5 %    A-a DO2 0.5 mmHg    Barometric Pressure for Blood Gas 748.2000 mmHg    Modality BiPap     FIO2 28 %    Ventilator Mode NIV     Set [facility] Resp Rate 20     Rate 32 Breaths/minute    PEEP 5     Hemodilution No     Inspiratory Time 1     Device Comment AVAPS 14-32 epap 5 target 550 sats 97%     PO2/FIO2 273 0 - 500         RADIOLOGY  XR Chest 1 View  Result Date: 4/10/2025  XR CHEST 1 VW-  HISTORY: Male who is 79 years-old, short of breath  TECHNIQUE: Frontal views of the chest  COMPARISON: 4/4/2024  FINDINGS: Heart, mediastinum and pulmonary vasculature are unremarkable. Sternotomy wires are present. No focal pulmonary consolidation, pleural effusion, or pneumothorax. Gas-filled loops of bowel  are partly included. No acute osseous process.      No focal pulmonary consolidation. Follow-up as clinical indications persist.  This report was finalized on 4/10/2025 4:50 PM by Dr. Jarred Albarado M.D on Workstation: [id number]          MEDICATIONS GIVEN IN ER  Medications   sodium chloride 0.9 % flush 10 mL (has no administration in time range)   ipratropium-albuterol (DUO-NEB) nebulizer solution 3 mL (3 mL Nebulization Given 4/10/25 1607)   cefTRIAXone (ROCEPHIN) 2,000 mg in sodium chloride 0.9 % 100 mL MBP (0 mg Intravenous Stopped 4/10/25 1753)   albuterol (PROVENTIL) nebulizer solution 0.083% 2.5 mg/3mL (2.5 mg Nebulization Given 4/10/25 1656)         ORDERS PLACED DURING THIS VISIT:  Orders Placed This Encounter   Procedures    Respiratory Panel PCR w/COVID-19(SARS-CoV-2) KUSHAL/JAMI/MENA/PAD/COR/GARY In-House, NP Swab in UTM/VTM, 2 HR TAT - Swab, Nasopharynx    Blood Culture - Blood,    Blood Culture - Blood,    XR Chest 1 View    Comprehensive Metabolic Panel    Protime-INR    BNP    High Sensitivity Troponin T    Blood Gas, Arterial -    Lactic Acid, Plasma    Procalcitonin    Ethanol    Magnesium    Phosphorus    CBC Auto Differential    High Sensitivity Troponin T 1Hr    Blood Gas, Arterial -    Blood Gas, Arterial -    [facility] (on-call MD unless specified) Details    ECG 12 Lead Dyspnea    Insert Peripheral IV    Inpatient Admission    CBC & Differential         OUTPATIENT MEDICATION MANAGEMENT:  Current Facility-Administered Medications Ordered in Epic   Medication Dose Route Frequency Provider Last Rate Last Admin    sodium chloride 0.9 % flush 10 mL  10 mL Intravenous PRN Corinne Mancia MD         Current Outpatient Medications Ordered in Epic   Medication Sig Dispense Refill    albuterol sulfate  (90 Base) MCG/ACT inhaler Inhale 2 puffs Every 4 (Four) Hours As Needed for Wheezing.      apixaban (ELIQUIS) 5 MG tablet tablet Take 1 tablet by mouth Every 12 (Twelve) Hours.      aspirin 81 MG EC  tablet Take 1 tablet by mouth Daily.      atorvastatin (LIPITOR) 80 MG tablet Take 1 tablet by mouth Daily.      budesonide-formoterol (SYMBICORT) 160-4.5 MCG/ACT inhaler Inhale 2 puffs 2 (Two) Times a Day. 10.2 g 0    famotidine (PEPCID) 20 MG tablet Take 1 tablet by mouth 2 (Two) Times a Day.      fluticasone (FLONASE) 50 MCG/ACT nasal spray 2 sprays into the nostril(s) as directed by provider Daily.      folic acid (FOLVITE) 1 MG tablet Take 1 tablet by mouth Daily.      guaiFENesin (MUCINEX) 600 MG 12 hr tablet Take 2 tablets by mouth 2 (Two) Times a Day.      metoprolol tartrate (LOPRESSOR) 25 MG tablet Take 1 tablet by mouth 2 (Two) Times a Day. 60 tablet 3    polyethylene glycol (MIRALAX) 17 g packet Take 17 g by mouth Daily.      sacubitril-valsartan (ENTRESTO) 24-26 MG tablet Take 1 tablet by mouth 2 (Two) Times a Day.      tiotropium bromide monohydrate (SPIRIVA RESPIMAT) 2.5 MCG/ACT aerosol solution inhaler Inhale 2 puffs Daily.      vitamin B-12 (CYANOCOBALAMIN) 500 MCG tablet Take 2 tablets by mouth Daily.      vitamin D (ERGOCALCIFEROL) 1.25 MG (47244 UT) capsule capsule Take 1 capsule by mouth 1 (One) Time Per Week.           PROCEDURES  Procedures      Critical care provider statement:    Critical care time (minutes): 36.   Critical care time was exclusive of:  Separately billable procedures and treating other patients   Critical care was necessary to treat or prevent imminent or life-threatening deterioration of the following conditions:  Respiratory Failure   Critical care was time spent personally by me on the following activities:  Development of treatment plan with patient or surrogate, discussions with consultants, evaluation of patient's response to treatment, examination of patient, obtaining history from patient or surrogate, ordering and performing treatments and interventions, ordering and review of laboratory studies, ordering and review of radiographic studies, pulse oximetry,  re-evaluation of patient's condition and review of old charts. Critical Care indicators: COPD/CHF Severe Exacerbation and Hypoxia / hypoxemia       PROGRESS, DATA ANALYSIS, CONSULTS, AND MEDICAL DECISION MAKING  All labs have been independently interpreted by me.  All radiology studies have been reviewed by me. All EKG's have been independently viewed and interpreted by me.  Discussion below represents my analysis of pertinent findings related to patient's condition, differential diagnosis, treatment plan and final disposition.    This patient presents with dyspnea, most likely secondary to pneumonia, copd exacerbation, and/or viral illness.  The differential diagnosis list includes but is not limited to:   - acute cardiac etiologies like ACS, CHF, pericardial effusion or even tamponade  - acute respiratory etiologies like acute PE, pneumothorax , asthma, COPD exacerbation, allergic etiologies, or infectious etiologies such as PNA   - non-cardiopulmonary causes like toxidromes, metabolic etiologies such as acidemia or electrolyte derangements or even DKA, sepsis, neurologic causes including GBS and myasthenia gravis  Plan EKG, CXR, Labs incl ABG, bnp and trop and +/- viral swab. Respiratory at bedside to transition patient to our Bipap from ems cpap upon arrival.      ED Course as of 04/10/25 1857   Thu Apr 10, 2025   1557 EKG ER MD interpretation   Time: 15: 51  Rhythm and rate: Sinus rhythm at a rate of 94 with multiple PVCs and left bundle branch block  Axis: Normal  P waves: Normal  QRS complexes: Left bundle branch block with corresponding ST segment and T wave changes comparison EKG is from April 5, 2024, patient had left axis deviation and lateral T wave inversions [AR]   1801 Pt with improved ABG and only on 28% FiO2 on bipap. Plan trial on NC and reassess. [AR]   1804 Pt doing well on 2L via NC.  Family at bedside states that they just had their first meeting with hospice because patient has COPD and relies  on his bipap a lot at home [facility].  Pt has visiting nursesbut no specific sick contacts. [AR]   1852 I discussed patient's case with Dr. Boswell--plan admission. [AR]      ED Course User Index  [AR] Corinne Mancia MD             AS OF 18:57 EDT VITALS:    BP - 97/57  HR - 87  TEMP - 97.3 °F (36.3 °C) (Tympanic)  O2 SATS - 90%      COMPLEXITY OF CARE  The patient requires admission.    DIAGNOSIS  Final diagnoses:   Acute respiratory failure with hypoxia and hypercapnia   COVID-19         DISPOSITION  ED Disposition       ED Disposition   Decision to Admit    Condition   --    Comment   Level of Care: Telemetry [5]   Diagnosis: Acute respiratory failure with hypoxia and hypercapnia [[id number]]   Admitting Physician: KRYSTA BOSWELL [[id number]]   Attending Physician: KRYSTA BOSWELL [[id number]]   Isolate for COVID?: Yes [1]   Bed Request Comments: covid-19+   Certification: I Certify That Inpatient Hospital Services Are Medically Necessary For Greater Than 2 Midnights                  Please note that portions of this document were completed with a voice recognition program.    Note Disclaimer: At [facility], we believe that sharing information builds trust and better relationships. You are receiving this note because you recently visited [facility]. It is possible you will see health information before a provider has talked with you about it. This kind of information can be easy to misunderstand. To help you fully understand what it means for your health, we urge you to discuss this note with your provider.         Corinne Mancia MD  04/10/25 2037

## 2025-04-10 NOTE — ED NOTES
Pt arrived to ER from home via EMS, /co shortness of breath earlier today and has gotten worse, O2 was in the 70's when EMS arrived, put pt on cpap, now sating at 98. Hx of COPD. Pt not normally on oxygen at home.

## 2025-04-10 NOTE — ED NOTES
.Nursing report ED to floor  Wisam Malone  79 y.o.  male    HPI :  HPI  Stated Reason for Visit: shortness of air  History Obtained From: EMS  Duration (Hours): 7    Chief Complaint  Chief Complaint   Patient presents with    Shortness of Breath       Admitting doctor:   Aishwarya Srivastava MD    Admitting diagnosis:   The primary encounter diagnosis was Acute respiratory failure with hypoxia and hypercapnia. A diagnosis of COVID-19 was also pertinent to this visit.    Code status:   Current Code Status       Date Active Code Status Order ID Comments User Context       4/10/2025 1858 CPR (Attempt to Resuscitate) [id number]  Pedro Malone, APRN ED        Question Answer    Code Status (Patient has no pulse and is not breathing) CPR (Attempt to Resuscitate)    Medical Interventions (Patient has pulse or is breathing) Full Support                    Allergies:   Patient has no known allergies.    Isolation:   Enhanced Droplet/Contact     Intake and Output  No intake or output data in the 24 hours ending 04/10/25 1922    Weight:   There were no vitals filed for this visit.    Most recent vitals:   Vitals:    04/10/25 1705 04/10/25 1801 04/10/25 1830 04/10/25 1831   BP:  96/53  97/57   BP Location:       Patient Position:       Pulse: 92 91 87    Resp: (!) 34      Temp:       TempSrc:       SpO2: 100% 97% 90%        Active LDAs/IV Access:   Lines, Drains & Airways       Active LDAs       Name Placement date Placement time Site Days    Peripheral IV 04/10/25 1550 Anterior;Distal;Left;Upper Arm 04/10/25  1550  Arm  less than 1                    Labs (abnormal labs have a star):   Labs Reviewed   RESPIRATORY PANEL PCR W/ COVID-19 (SARS-COV-2), NP SWAB IN UTM/VTP, 2 HR TAT - Abnormal; Notable for the following components:       Result Value    COVID19 Detected (*)     All other components within normal limits    Narrative:     In the setting of a positive respiratory panel with a viral infection PLUS a negative  procalcitonin without other underlying concern for bacterial infection, consider observing off antibiotics or discontinuation of antibiotics and continue supportive care. If the respiratory panel is positive for atypical bacterial infection (Bordetella pertussis, Chlamydophila pneumoniae, or Mycoplasma pneumoniae), consider antibiotic de-escalation to target atypical bacterial infection.   COMPREHENSIVE METABOLIC PANEL - Abnormal; Notable for the following components:    Glucose 131 (*)     CO2 30.8 (*)     Alkaline Phosphatase 157 (*)     All other components within normal limits    Narrative:     GFR Categories in Chronic Kidney Disease (CKD)      GFR Category          GFR (mL/min/1.73)    Interpretation  G1                     90 or greater         Normal or high (1)  G2                      60-89                Mild decrease (1)  G3a                   45-59                Mild to moderate decrease  G3b                   30-44                Moderate to severe decrease  G4                    15-29                Severe decrease  G5                    14 or less           Kidney failure          (1)In the absence of evidence of kidney disease, neither GFR category G1 or G2 fulfill the criteria for CKD.    eGFR calculation 2021 CKD-EPI creatinine equation, which does not include race as a factor   PROTIME-INR - Abnormal; Notable for the following components:    Protime 16.7 (*)     INR 1.35 (*)     All other components within normal limits   BNP (IN-HOUSE) - Abnormal; Notable for the following components:    proBNP 1,824.0 (*)     All other components within normal limits    Narrative:     This assay is used as an aid in the diagnosis of individuals suspected of having heart failure. It can be used as an aid in the diagnosis of acute decompensated heart failure (ADHF) in patients presenting with signs and symptoms of ADHF to the emergency department (ED). In addition, NT-proBNP of <300 pg/mL indicates ADHF is not  likely.    Age Range Result Interpretation  NT-proBNP Concentration (pg/mL:      <50             Positive            >450                   Gray                 300-450                    Negative             <300    50-75           Positive            >900                  Gray                300-900                  Negative            <300      >75             Positive            >1800                  Gray                300-1800                  Negative            <300   TROPONIN - Abnormal; Notable for the following components:    HS Troponin T 32 (*)     All other components within normal limits    Narrative:     High Sensitive Troponin T Reference Range:  <14.0 ng/L- Negative Female for AMI  <22.0 ng/L- Negative Male for AMI  >=14 - Abnormal Female indicating possible myocardial injury.  >=22 - Abnormal Male indicating possible myocardial injury.   Clinicians would have to utilize clinical acumen, EKG, Troponin, and serial changes to determine if it is an Acute Myocardial Infarction or myocardial injury due to an underlying chronic condition.        BLOOD GAS, ARTERIAL - Abnormal; Notable for the following components:    pH, Arterial 7.305 (*)     pCO2, Arterial 67.0 (*)     pO2, Arterial 248.8 (*)     HCO3, Arterial 33.4 (*)     Base Excess, Arterial 4.4 (*)     O2 Saturation, Arterial 99.8 (*)     All other components within normal limits   CBC WITH AUTO DIFFERENTIAL - Abnormal; Notable for the following components:    WBC 12.91 (*)     RDW 12.1 (*)     Neutrophil % 77.0 (*)     Lymphocyte % 12.2 (*)     Neutrophils, Absolute 9.92 (*)     Monocytes, Absolute 1.19 (*)     Immature Grans, Absolute 0.07 (*)     All other components within normal limits   HIGH SENSITIVITIY TROPONIN T 1HR - Abnormal; Notable for the following components:    HS Troponin T 32 (*)     All other components within normal limits    Narrative:     High Sensitive Troponin T Reference Range:  <14.0 ng/L- Negative Female for AMI  <22.0  "ng/L- Negative Male for AMI  >=14 - Abnormal Female indicating possible myocardial injury.  >=22 - Abnormal Male indicating possible myocardial injury.   Clinicians would have to utilize clinical acumen, EKG, Troponin, and serial changes to determine if it is an Acute Myocardial Infarction or myocardial injury due to an underlying chronic condition.        BLOOD GAS, ARTERIAL - Abnormal; Notable for the following components:    pCO2, Arterial 51.1 (*)     pO2, Arterial 76.5 (*)     HCO3, Arterial 30.1 (*)     Base Excess, Arterial 3.9 (*)     All other components within normal limits   LACTIC ACID, PLASMA - Normal   PROCALCITONIN - Normal    Narrative:     As a Marker for Sepsis (Non-Neonates):    1. <0.5 ng/mL represents a low risk of severe sepsis and/or septic shock.  2. >2 ng/mL represents a high risk of severe sepsis and/or septic shock.    As a Marker for Lower Respiratory Tract Infections that require antibiotic therapy:    PCT on Admission    Antibiotic Therapy       6-12 Hrs later    >0.5                Strongly Recommended  >0.25 - <0.5        Recommended   0.1 - 0.25          Discouraged              Remeasure/reassess PCT  <0.1                Strongly Discouraged     Remeasure/reassess PCT    As 28 day mortality risk marker: \"Change in Procalcitonin Result\" (>80% or <=80%) if Day 0 (or Day 1) and Day 4 values are available. Refer to [email or web address]-[email or web address]-[email or web address]    Change in PCT <=80%  A decrease of PCT levels below or equal to 80% defines a positive change in PCT test result representing a higher risk for 28-day all-cause mortality of patients diagnosed with severe sepsis for septic shock.    Change in PCT >80%  A decrease of PCT levels of more than 80% defines a negative change in PCT result representing a lower risk for 28-day all-cause mortality of patients diagnosed with severe sepsis or septic shock.      MAGNESIUM - Normal   PHOSPHORUS - Normal   BLOOD CULTURE   BLOOD CULTURE   ETHANOL "   BLOOD GAS, ARTERIAL   CBC AND DIFFERENTIAL    Narrative:     The following orders were created for panel order CBC & Differential.  Procedure                               Abnormality         Status                     ---------                               -----------         ------                     CBC Auto Differential[[id number]]        Abnormal            Final result                 Please view results for these tests on the individual orders.       EKG:   ECG 12 Lead Dyspnea   Preliminary Result   HEART RATE=94  bpm   RR Dbdtaafj=871  ms   IL Ivzzjpmb=634  ms   P Horizontal Axis=-44  deg   P Front Axis=54  deg   QRSD Kpcaxtjj=906  ms   QT Xdsnbhjy=707  ms   DYdM=116  ms   QRS Axis=-57  deg   T Wave Axis=127  deg   - ABNORMAL ECG -   Sinus rhythm   Multiform ventricular premature complexes   Probable left atrial enlargement   Left bundle branch block   Date and Time of Study:2025-04-10 15:51:10          Meds given in ED:   Medications   sodium chloride 0.9 % flush 10 mL (has no administration in time range)   sodium chloride 0.9 % flush 10 mL (has no administration in time range)   nitroglycerin (NITROSTAT) SL tablet 0.4 mg (has no administration in time range)   acetaminophen (TYLENOL) tablet 650 mg (has no administration in time range)     Or   acetaminophen (TYLENOL) 160 MG/5ML oral solution 650 mg (has no administration in time range)     Or   acetaminophen (TYLENOL) suppository 650 mg (has no administration in time range)   sennosides-docusate (PERICOLACE) 8.6-50 MG per tablet 2 tablet (has no administration in time range)     And   polyethylene glycol (MIRALAX) packet 17 g (has no administration in time range)     And   bisacodyl (DULCOLAX) EC tablet 5 mg (has no administration in time range)     And   bisacodyl (DULCOLAX) suppository 10 mg (has no administration in time range)   melatonin tablet 5 mg (has no administration in time range)   ondansetron ODT (ZOFRAN-ODT) disintegrating tablet 4 mg  (has no administration in time range)     Or   ondansetron (ZOFRAN) injection 4 mg (has no administration in time range)   aluminum-magnesium hydroxide-simethicone (MAALOX MAX) 400-400-40 MG/5ML suspension 15 mL (has no administration in time range)   ipratropium-albuterol (DUO-NEB) nebulizer solution 3 mL (has no administration in time range)   dexAMETHasone (DECADRON) tablet 6 mg (has no administration in time range)     Or   dexAMETHasone (DECADRON) injection 6 mg (has no administration in time range)   Pharmacy Consult - Remdesivir for Severe COVID-19 (Within 7 days of symptom onset) (has no administration in time range)   ipratropium-albuterol (DUO-NEB) nebulizer solution 3 mL (3 mL Nebulization Given 4/10/25 1607)   cefTRIAXone (ROCEPHIN) 2,000 mg in sodium chloride 0.9 % 100 mL MBP (0 mg Intravenous Stopped 4/10/25 1753)   albuterol (PROVENTIL) nebulizer solution 0.083% 2.5 mg/3mL (2.5 mg Nebulization Given 4/10/25 1657)       Imaging results:  XR Chest 1 View  Result Date: 4/10/2025  No focal pulmonary consolidation. Follow-up as clinical indications persist.  This report was finalized on 4/10/2025 4:50 PM by Dr. Jarred Albarado M.D on Workstation: [id number]        Ambulatory status:   - bedrest    Social issues:   Social History     Socioeconomic History    Marital status:    Tobacco Use    Smoking status: Former     Average packs/day: 1 pack/day for 62.0 years (62.0 ttl pk-yrs)     Types: Cigarettes     Start date: 1960    Smokeless tobacco: Never   Vaping Use    Vaping status: Never Used   Substance and Sexual Activity    Alcohol use: Never    Drug use: Never    Sexual activity: Yes     Partners: Female       Peripheral Neurovascular  Peripheral Neurovascular (Adult)  Peripheral Neurovascular WDL: WDL    Neuro Cognitive  Neuro Cognitive (Adult)  Cognitive/Neuro/Behavioral WDL: WDL    Learning  Learning Assessment  Learning Readiness and Ability: no barriers identified    Respiratory  Respiratory  WDL  Respiratory WDL: .WDL except, all  Rhythm/Pattern, Respiratory: assisted mechanically (wearing V60 bipap)  Expansion/Accessory Muscles/Retractions: no use of accessory muscles  Breath Sounds  All Lung Fields Breath Sounds: All Fields  All Lung Fields Breath Sounds: Anterior:, Lateral:, coarse, diminished  Breath Sounds Post-Respiratory Treatment  Breath Sounds Posttreatment All Fields: All Fields  Breath Sounds Posttreatment All Fields: no change    Abdominal Pain       Pain Assessments  Pain (Adult)  (0-10) Pain Rating: Rest: 0  (0-10) Pain Rating: Activity: 0    NIH Stroke Scale       Desirae Ibarra RN  04/10/25 19:22 EDT

## 2025-04-10 NOTE — PROGRESS NOTES
"[facility]  Clinical Pharmacy Department     Remdesivir Review Note  Does patient qualify for nirmatrelvir/ritonavir (Paxlovid)?: no   If no, please check one of the following rationale(s):   [] Patient is outside 5 days from symptom onset window   [] Patient has a drug-drug interaction that cannot be managed while admitted (I.e. can't dose adjust or hold during Paxlovid therapy)  [x] Patient qualifies for a 5-day treatment course of Remdesivir (requires supplemental oxygen or SpO2 <= 94% on room air)    Wisam Malone is a 79 y.o. male with confirmed COVID-19 infection on day 1 of hospitalization.      Consulting Provider: Dr. Pedro Malone  Date of Confirmed SARS-CoV-2: 4/10  Date of Symptom Onset: 4/10  Other Antimicrobials: ceftriaxone 2 g x1      Allergies  Allergies as of 04/10/2025    (No Known Allergies)       Vitals/Labs/I&O  Visit Vitals  /56   Pulse 85   Temp 97.3 °F (36.3 °C) (Tympanic)   Resp (!) 34   SpO2 91%     Results from last 7 days   Lab Units 04/10/25  1558   WBC 10*3/mm3 12.91*   PROCALCITONIN ng/mL 0.07   AST (SGOT) U/L 25   ALT (SGPT) U/L 29   BUN mg/dL 20   CREATININE mg/dL 1.06     CrCl cannot be calculated (Unknown ideal weight.).    Assessment/Plan:    Patient meets the following inclusion/exclusion criteria:  Inclusion: Must have both \"A\" and \"B\" or both \"A\" and \"C\" below  to be considered for treatment.  A. Confirmed COVID-19 infection (with accompanying symptoms).  B. Requiring supplemental oxygen OR an increase in baseline oxygen requirements OR SpO2 < or = 94% on room air all secondary to COVID-19 infection AND within 10 days of symptom onset.  C. Symptomatic (but not requiring supplemental oxygen or increase in baseline secondary to COVID-19) with at least one of the high risk criteria (see COVID-19 Treatment Guideline) putting the patient at high risk for progression to severe illness AND within 7 days of symptom onset.  High-Risk criteria:  Age >= 65 years (risk " increases with each decade after age 50) AND > 6 months since last COVID-19 vaccine    OR   ? Moderate to severe immunocompromising conditions or receipt of immunosuppressive medications (see included list below)   Are receiving active treatment for solid tumor and hematologic malignancies.   Have hematologic malignancies (e.g., chronic lymphocytic lymphoma, non-Hodgkin lymphoma, multiple myeloma, acute leukemia) and are known to have poor responses to COVID-19 vaccines, regardless of the treatment status for the hematologic malignancy.   Received a solid-organ or islet transplant and are receiving immunosuppressive therapy.   Received chimeric antigen receptor T cell (CAR T-cell) therapy or a hematopoietic cell transplant (HCT) and are within 2 years of transplantation or are receiving immunosuppressive therapy.   Have a moderate or severe primary immunodeficiency (e.g., severe combined immunodeficiency, DiGeorge syndrome, Wiskott-Gely syndrome, common variable immunodeficiency disease).   Have advanced or untreated HIV infection (defined as people with HIV and CD4 T lymphocyte cell counts <200 cells/mm3, a history of an AIDS-defining illness without immune reconstitution, or clinical manifestations of symptomatic HIV).   Are receiving active treatment with high-dose corticosteroids (i.e., >=20 mg prednisone or equivalent per day for >=2 weeks), alkylating agents, antimetabolites, transplant-related immunosuppressive drugs, cancer chemotherapeutic agents classified as severely immunosuppressive, or immunosuppressive or immunomodulatory biologic agents (e.g., B cell-depleting agents).      Exclusions:  A. Consider discontinuation if ALT > 10 times ULN; discontinuation should occur with ALT elevation plus signs/symptoms of liver inflammation.  B. Concomitant administration of hydroxychloroquine or chloroquine due to antagonistic effect; discontinue prior to remdesivir use.  C. Symptom onset > 10 days: will be  excluded (only considered on a rare case by case basis); if patient is > 6 days but <= 10 days, will also be considered on a per patient basis.  D. Requiring or progression to invasive mechanical ventilation or ECMO.    Remdesivir 200 mg IV x 1 dose, followed by 100 mg IV q24h for 5 days or until hospital discharge (whichever comes first) has been ordered.    Thank you for involving pharmacy in this patient's care. Please contact pharmacy with any questions or concerns.                           Ju Guzman [facility]  Clinical Pharmacist

## 2025-04-11 PROBLEM — J96.22 ACUTE ON CHRONIC RESPIRATORY FAILURE WITH HYPOXIA AND HYPERCAPNIA: Status: ACTIVE | Noted: 2025-04-10

## 2025-04-11 PROBLEM — J96.21 ACUTE ON CHRONIC RESPIRATORY FAILURE WITH HYPOXIA AND HYPERCAPNIA: Status: ACTIVE | Noted: 2025-04-10

## 2025-04-11 LAB
ALBUMIN SERPL-MCNC: 3.2 G/DL (ref 3.5–5.2)
ALBUMIN/GLOB SERPL: 1.4 G/DL
ALP SERPL-CCNC: 118 U/L (ref 39–117)
ALT SERPL W P-5'-P-CCNC: 24 U/L (ref 1–41)
ANION GAP SERPL CALCULATED.3IONS-SCNC: 7.4 MMOL/L (ref 5–15)
AST SERPL-CCNC: 17 U/L (ref 1–40)
BASOPHILS # BLD AUTO: 0 10*3/MM3 (ref 0–0.2)
BASOPHILS NFR BLD AUTO: 0 % (ref 0–1.5)
BILIRUB SERPL-MCNC: <0.2 MG/DL (ref 0–1.2)
BUN SERPL-MCNC: 25 MG/DL (ref 8–23)
BUN/CREAT SERPL: 23.8 (ref 7–25)
CALCIUM SPEC-SCNC: 8.7 MG/DL (ref 8.6–10.5)
CHLORIDE SERPL-SCNC: 104 MMOL/L (ref 98–107)
CO2 SERPL-SCNC: 29.6 MMOL/L (ref 22–29)
CREAT SERPL-MCNC: 1.05 MG/DL (ref 0.76–1.27)
CRP SERPL-MCNC: 1.72 MG/DL (ref 0–0.5)
D DIMER PPP FEU-MCNC: 1.01 MCGFEU/ML (ref 0–0.8)
DEPRECATED RDW RBC AUTO: 39.5 FL (ref 37–54)
EGFRCR SERPLBLD CKD-EPI 2021: 71.8 ML/MIN/1.73
EOSINOPHIL # BLD AUTO: 0 10*3/MM3 (ref 0–0.4)
EOSINOPHIL NFR BLD AUTO: 0 % (ref 0.3–6.2)
ERYTHROCYTE [DISTWIDTH] IN BLOOD BY AUTOMATED COUNT: 12 % (ref 12.3–15.4)
GLOBULIN UR ELPH-MCNC: 2.3 GM/DL
GLUCOSE SERPL-MCNC: 173 MG/DL (ref 65–99)
HCT VFR BLD AUTO: 37.1 % (ref 37.5–51)
HGB BLD-MCNC: 12.1 G/DL (ref 13–17.7)
IMM GRANULOCYTES # BLD AUTO: 0.04 10*3/MM3 (ref 0–0.05)
IMM GRANULOCYTES NFR BLD AUTO: 0.9 % (ref 0–0.5)
LYMPHOCYTES # BLD AUTO: 0.3 10*3/MM3 (ref 0.7–3.1)
LYMPHOCYTES NFR BLD AUTO: 6.7 % (ref 19.6–45.3)
MAGNESIUM SERPL-MCNC: 2.1 MG/DL (ref 1.6–2.4)
MCH RBC QN AUTO: 29.5 PG (ref 26.6–33)
MCHC RBC AUTO-ENTMCNC: 32.6 G/DL (ref 31.5–35.7)
MCV RBC AUTO: 90.5 FL (ref 79–97)
MONOCYTES # BLD AUTO: 0.28 10*3/MM3 (ref 0.1–0.9)
MONOCYTES NFR BLD AUTO: 6.3 % (ref 5–12)
NEUTROPHILS NFR BLD AUTO: 3.83 10*3/MM3 (ref 1.7–7)
NEUTROPHILS NFR BLD AUTO: 86.1 % (ref 42.7–76)
NRBC BLD AUTO-RTO: 0 /100 WBC (ref 0–0.2)
PHOSPHATE SERPL-MCNC: 3.1 MG/DL (ref 2.5–4.5)
PLATELET # BLD AUTO: 178 10*3/MM3 (ref 140–450)
PMV BLD AUTO: 9.3 FL (ref 6–12)
POTASSIUM SERPL-SCNC: 5.2 MMOL/L (ref 3.5–5.2)
PROT SERPL-MCNC: 5.5 G/DL (ref 6–8.5)
RBC # BLD AUTO: 4.1 10*6/MM3 (ref 4.14–5.8)
SODIUM SERPL-SCNC: 141 MMOL/L (ref 136–145)
WBC NRBC COR # BLD AUTO: 4.45 10*3/MM3 (ref 3.4–10.8)

## 2025-04-11 PROCEDURE — 25010000002 DEXAMETHASONE PER 1 MG: Performed by: NURSE PRACTITIONER

## 2025-04-11 PROCEDURE — 85025 COMPLETE CBC W/AUTO DIFF WBC: CPT | Performed by: NURSE PRACTITIONER

## 2025-04-11 PROCEDURE — 25010000002 REMDESIVIR 100 MG/20ML SOLUTION 1 EACH VIAL: Performed by: NURSE PRACTITIONER

## 2025-04-11 PROCEDURE — 84100 ASSAY OF PHOSPHORUS: CPT | Performed by: STUDENT IN AN ORGANIZED HEALTH CARE EDUCATION/TRAINING PROGRAM

## 2025-04-11 PROCEDURE — 94760 N-INVAS EAR/PLS OXIMETRY 1: CPT

## 2025-04-11 PROCEDURE — 94660 CPAP INITIATION&MGMT: CPT

## 2025-04-11 PROCEDURE — 86140 C-REACTIVE PROTEIN: CPT | Performed by: NURSE PRACTITIONER

## 2025-04-11 PROCEDURE — 94799 UNLISTED PULMONARY SVC/PX: CPT

## 2025-04-11 PROCEDURE — 94664 DEMO&/EVAL PT USE INHALER: CPT

## 2025-04-11 PROCEDURE — 25810000003 SODIUM CHLORIDE 0.9 % SOLUTION 250 ML FLEX CONT: Performed by: NURSE PRACTITIONER

## 2025-04-11 PROCEDURE — 80053 COMPREHEN METABOLIC PANEL: CPT | Performed by: NURSE PRACTITIONER

## 2025-04-11 PROCEDURE — 85379 FIBRIN DEGRADATION QUANT: CPT | Performed by: NURSE PRACTITIONER

## 2025-04-11 PROCEDURE — 94761 N-INVAS EAR/PLS OXIMETRY MLT: CPT

## 2025-04-11 PROCEDURE — 83735 ASSAY OF MAGNESIUM: CPT | Performed by: STUDENT IN AN ORGANIZED HEALTH CARE EDUCATION/TRAINING PROGRAM

## 2025-04-11 RX ORDER — METOPROLOL TARTRATE 25 MG/1
25 TABLET, FILM COATED ORAL 2 TIMES DAILY
Status: DISCONTINUED | OUTPATIENT
Start: 2025-04-11 | End: 2025-04-16 | Stop reason: HOSPADM

## 2025-04-11 RX ORDER — AZITHROMYCIN 250 MG/1
500 TABLET, FILM COATED ORAL ONCE
Status: COMPLETED | OUTPATIENT
Start: 2025-04-11 | End: 2025-04-11

## 2025-04-11 RX ORDER — MULTIVITAMIN WITH IRON
1000 TABLET ORAL DAILY
Status: DISCONTINUED | OUTPATIENT
Start: 2025-04-11 | End: 2025-04-16 | Stop reason: HOSPADM

## 2025-04-11 RX ORDER — ATORVASTATIN CALCIUM 80 MG/1
80 TABLET, FILM COATED ORAL DAILY
Status: DISCONTINUED | OUTPATIENT
Start: 2025-04-11 | End: 2025-04-16 | Stop reason: HOSPADM

## 2025-04-11 RX ORDER — AZITHROMYCIN 250 MG/1
250 TABLET, FILM COATED ORAL
Status: COMPLETED | OUTPATIENT
Start: 2025-04-12 | End: 2025-04-15

## 2025-04-11 RX ORDER — ASPIRIN 81 MG/1
81 TABLET ORAL DAILY
Status: DISCONTINUED | OUTPATIENT
Start: 2025-04-11 | End: 2025-04-16 | Stop reason: HOSPADM

## 2025-04-11 RX ORDER — POLYETHYLENE GLYCOL 3350 17 G/17G
17 POWDER, FOR SOLUTION ORAL DAILY
Status: DISCONTINUED | OUTPATIENT
Start: 2025-04-11 | End: 2025-04-16 | Stop reason: HOSPADM

## 2025-04-11 RX ORDER — BUDESONIDE AND FORMOTEROL FUMARATE DIHYDRATE 160; 4.5 UG/1; UG/1
2 AEROSOL RESPIRATORY (INHALATION)
Status: DISCONTINUED | OUTPATIENT
Start: 2025-04-11 | End: 2025-04-16 | Stop reason: HOSPADM

## 2025-04-11 RX ORDER — GUAIFENESIN 600 MG/1
600 TABLET, EXTENDED RELEASE ORAL EVERY 12 HOURS SCHEDULED
Status: DISCONTINUED | OUTPATIENT
Start: 2025-04-11 | End: 2025-04-16 | Stop reason: HOSPADM

## 2025-04-11 RX ORDER — FAMOTIDINE 20 MG/1
20 TABLET, FILM COATED ORAL 2 TIMES DAILY
Status: DISCONTINUED | OUTPATIENT
Start: 2025-04-11 | End: 2025-04-16 | Stop reason: HOSPADM

## 2025-04-11 RX ADMIN — APIXABAN 5 MG: 5 TABLET, FILM COATED ORAL at 09:43

## 2025-04-11 RX ADMIN — GUAIFENESIN 600 MG: 600 TABLET, EXTENDED RELEASE ORAL at 09:31

## 2025-04-11 RX ADMIN — AZITHROMYCIN 500 MG: 250 TABLET, FILM COATED ORAL at 09:31

## 2025-04-11 RX ADMIN — METOPROLOL TARTRATE 25 MG: 25 TABLET, FILM COATED ORAL at 01:45

## 2025-04-11 RX ADMIN — METOPROLOL TARTRATE 25 MG: 25 TABLET, FILM COATED ORAL at 09:43

## 2025-04-11 RX ADMIN — FAMOTIDINE 20 MG: 20 TABLET, FILM COATED ORAL at 21:32

## 2025-04-11 RX ADMIN — REMDESIVIR 200 MG: 100 INJECTION, POWDER, LYOPHILIZED, FOR SOLUTION INTRAVENOUS at 01:21

## 2025-04-11 RX ADMIN — IPRATROPIUM BROMIDE AND ALBUTEROL SULFATE 3 ML: .5; 3 SOLUTION RESPIRATORY (INHALATION) at 20:40

## 2025-04-11 RX ADMIN — APIXABAN 5 MG: 5 TABLET, FILM COATED ORAL at 01:45

## 2025-04-11 RX ADMIN — SODIUM CHLORIDE 100 MG: 9 INJECTION, SOLUTION INTRAVENOUS at 21:32

## 2025-04-11 RX ADMIN — IPRATROPIUM BROMIDE AND ALBUTEROL SULFATE 3 ML: .5; 3 SOLUTION RESPIRATORY (INHALATION) at 15:27

## 2025-04-11 RX ADMIN — DEXAMETHASONE SODIUM PHOSPHATE 6 MG: 10 INJECTION INTRAMUSCULAR; INTRAVENOUS at 09:32

## 2025-04-11 RX ADMIN — Medication 1000 MCG: at 09:31

## 2025-04-11 RX ADMIN — APIXABAN 5 MG: 5 TABLET, FILM COATED ORAL at 21:32

## 2025-04-11 RX ADMIN — FAMOTIDINE 20 MG: 20 TABLET, FILM COATED ORAL at 09:31

## 2025-04-11 RX ADMIN — POLYETHYLENE GLYCOL 3350 17 G: 17 POWDER, FOR SOLUTION ORAL at 09:31

## 2025-04-11 RX ADMIN — METOPROLOL TARTRATE 25 MG: 25 TABLET, FILM COATED ORAL at 21:32

## 2025-04-11 RX ADMIN — GUAIFENESIN 600 MG: 600 TABLET, EXTENDED RELEASE ORAL at 21:32

## 2025-04-11 RX ADMIN — IPRATROPIUM BROMIDE AND ALBUTEROL SULFATE 3 ML: .5; 3 SOLUTION RESPIRATORY (INHALATION) at 07:44

## 2025-04-11 RX ADMIN — IPRATROPIUM BROMIDE AND ALBUTEROL SULFATE 3 ML: .5; 3 SOLUTION RESPIRATORY (INHALATION) at 11:14

## 2025-04-11 RX ADMIN — IPRATROPIUM BROMIDE AND ALBUTEROL SULFATE 3 ML: .5; 3 SOLUTION RESPIRATORY (INHALATION) at 02:54

## 2025-04-11 NOTE — CASE MANAGEMENT/SOCIAL WORK
Discharge Planning Assessment  [facility]     Patient Name: Wisam Malone  MRN: [id number]  Today's Date: 4/11/2025    Admit Date: 4/10/2025    Plan: Home with hospice vs inpatient hospice care pending progress.   Discharge Needs Assessment       Row Name 04/11/25 1455       Living Environment    People in Home child(maria m), adult;spouse    Current Living Arrangements home    Potentially Unsafe Housing Conditions none    Primary Care Provided by spouse/significant other;child(maria m)    Family Caregiver if Needed child(maria m), adult;spouse    Quality of Family Relationships involved;helpful    Able to Return to Prior Arrangements yes       Resource/Environmental Concerns    Resource/Environmental Concerns home accessibility    Home Accessibility Concerns stairs to enter home  4 SHRAVAN    Transportation Concerns none       Transition Planning    Patient/Family Anticipates Transition to home with family;home with help/services    Patient/Family Anticipated Services at Transition hospice care    Transportation Anticipated other (see comments)       Discharge Needs Assessment    Readmission Within the Last 30 Days no previous admission in last 30 days    Equipment Currently Used at Home walker, spencer;bp cuff;bipap;oxygen;rollator;wheelchair;shower chair;grab bar    Concerns to be Addressed adjustment to diagnosis/illness    Equipment Needed After Discharge none                   Discharge Plan       Row Name 04/11/25 1454       Plan    Plan Home with hospice vs inpatient hospice care pending progress.    Patient/Family in Agreement with Plan yes    Plan Comments Spoke with patient wife, introduced self and explained CCP role, confirmed face sheet and pharmacy information. Pt lives with wife Nicki and Son Nicho, in 1 level home with 4 SHRAVAN. Pt is current with in home Hospice care, revocating for now for MCR to cover cost of hospitalization, Ines viramontes, referral placed for Hospice to follow for dc needs, spoke with  [facility]/Hospice, pt only admitted to hospice at home on 4/9. Pt was on 2L at home and Bipap at bedtime, family states pt on bipap at all times except for eating and toileting. Home equipment includes bipap, s/c, gb, transport chair, [facility] [facility] Pt plans to dc home with family and hospice care, hospice will need to be notified to assist with oxygen needs transition, pt will also  need EMS transport under Hospice care. CCP will follow -Brinda [place]                    Expected Discharge Date and Time       Expected Discharge Date Expected Discharge Time    Apr 14, 2025            Demographic Summary       Row Name 04/11/25 1454       General Information    Admission Type inpatient                   Functional Status       Row Name 04/11/25 1454       Functional Status    Usual Activity Tolerance moderate    Current Activity Tolerance moderate       Assessment of Health Literacy    Health Literacy Good       Functional Status, IADL    Medications independent;assistive person    Meal Preparation assistive person    Housekeeping assistive person    Laundry assistive person    Shopping assistive person       Mental Status Summary    Recent Changes in Mental Status/Cognitive Functioning no changes                   Psychosocial    No documentation.                  Abuse/Neglect    No documentation.                  Legal       Row Name 04/11/25 1455       Financial/Legal    Who Manages Finances if Patient Unable wife                   Substance Abuse    No documentation.                  Patient Forms    No documentation.                     Brinda Fernandes RN

## 2025-04-11 NOTE — CONSULTS
Group: [facility]         CONSULT NOTE    Patient Identification:  Wisam Malone  79 y.o.  male  1945  [id number]            Reason for Consultation: COVID-19 infection, hypoxic and hypercapnic respiratory failure    CC: Shortness of breath    History of Present Illness:  Wisam Malone is an 79-year-old male with past medical history including: Chronic hypoxic respiratory failure, chronic hypercapnic respiratory failure, COPD, hypertension, ischemic cardiomyopathy, GERD, hyperlipidemia, and paroxysmal atrial fibrillation on chronic anticoagulation with apixaban.    [facility] and chronic hypercapnic/hypoxic respiratory failure.  Patient is not on oxygen at baseline, but does have a BiPAP at home for use.  [facility].    He presented to the emergency department on 4/10 with complaints of shortness of breath.  Patient reports acute onset of shortness of breath prior to ED arrival, prompting EMS call.  Upon EMS arrival, patient was hypoxic on room air with SpO2 around 70%.  EMS placed patient on CPAP en route to the hospital.  Patient denies any fever, chills, or sick contacts.  Endorses nonproductive cough.  ED evaluation revealed: High-sensitivity troponin 32, proBNP 1824.0, WBC 12.91.  Respiratory viral panel was positive for COVID-19.  Initial ABG showed a partially compensated respiratory acidosis with pH of 7.305, PaCO2 60.0, HCO3 33.4.  Patient was placed on BiPAP.  Follow-up ABG on BiPAP showed compensation.    Patient was admitted to the hospital for further treatment of COVID-19 and acute on chronic hypercapnic/hypoxic respiratory failure.    Review of Systems:  CONSTITUTIONAL:  Denies fevers or chills  EYE:  No new vision changes  EAR:  No change in hearing  CARDIAC:  No chest pain  PULMONARY:  No productive cough, positive for shortness of breath, nonproductive cough  GI:  No diarrhea,  hematemesis or hematochezia  RENAL:  No dysuria or urinary frequency  MUSCULOSKELETAL:  No musculoskeletal complaints  ENDOCRINE:  No heat or cold intolerance  INTEGUMENTARY: No skin rashes  NEUROLOGICAL:  No dizziness or confusion.  No seizure activity  PSYCHIATRIC:  No new anxiety or depression  12 system review of systems performed and all else negative     Past Medical History:  Past Medical History:   Diagnosis Date    COPD (chronic obstructive pulmonary disease)     Elevated cholesterol     GERD (gastroesophageal reflux disease)     Hypertension        Past Surgical History:  Past Surgical History:   Procedure Laterality Date    CORONARY ARTERY BYPASS GRAFT  1996        Home Meds:  Medications Prior to Admission   Medication Sig Dispense Refill Last Dose/Taking    apixaban (ELIQUIS) 5 MG tablet tablet Take 1 tablet by mouth Every 12 (Twelve) Hours.   4/10/2025 Morning    aspirin 81 MG EC tablet Take 1 tablet by mouth Daily.   4/10/2025 Morning    atorvastatin (LIPITOR) 80 MG tablet Take 1 tablet by mouth Daily.   4/9/2025 Bedtime    famotidine (PEPCID) 20 MG tablet Take 1 tablet by mouth 2 (Two) Times a Day.   4/10/2025 Morning    folic acid (FOLVITE) 1 MG tablet Take 1 tablet by mouth Daily.   4/9/2025 Bedtime    guaiFENesin (MUCINEX) 600 MG 12 hr tablet Take 2 tablets by mouth 2 (Two) Times a Day.   4/10/2025 Morning    metoprolol tartrate (LOPRESSOR) 25 MG tablet Take 1 tablet by mouth 2 (Two) Times a Day. 60 tablet 3 4/10/2025 Morning    sacubitril-valsartan (ENTRESTO) 24-26 MG tablet Take 1 tablet by mouth 2 (Two) Times a Day.   4/9/2025 Evening    vitamin B-12 (CYANOCOBALAMIN) 500 MCG tablet Take 2 tablets by mouth Daily.   4/10/2025 Morning    vitamin D (ERGOCALCIFEROL) 1.25 MG (29177 UT) capsule capsule Take 1 capsule by mouth 1 (One) Time Per Week.   4/10/2025 Morning    albuterol sulfate  (90 Base) MCG/ACT inhaler Inhale 2 puffs Every 4 (Four) Hours As Needed for Wheezing.        "budesonide-formoterol (SYMBICORT) 160-4.5 MCG/ACT inhaler Inhale 2 puffs 2 (Two) Times a Day. 10.2 g 0     fluticasone (FLONASE) 50 MCG/ACT nasal spray 2 sprays into the nostril(s) as directed by provider Daily.       polyethylene glycol (MIRALAX) 17 g packet Take 17 g by mouth Daily.       tiotropium bromide monohydrate (SPIRIVA RESPIMAT) 2.5 MCG/ACT aerosol solution inhaler Inhale 2 puffs Daily.          Allergies:  No Known Allergies    Social History:   Social History     Socioeconomic History    Marital status:    Tobacco Use    Smoking status: Former     Average packs/day: 1 pack/day for 62.0 years (62.0 ttl pk-yrs)     Types: Cigarettes     Start date: 1960    Smokeless tobacco: Never   Vaping Use    Vaping status: Never Used   Substance and Sexual Activity    Alcohol use: Never    Drug use: Never    Sexual activity: Yes     Partners: Female       Family History:  History reviewed. No pertinent family history.    Physical Exam:  BP 95/55 (BP Location: Right arm, Patient Position: Lying)   Pulse 80   Temp 98.4 °F (36.9 °C) (Oral)   Resp 22   Ht 185.4 cm (73\")   Wt 65 kg (143 lb 4.8 oz)   SpO2 95%   BMI 18.91 kg/m²  Body mass index is 18.91 kg/m². 95% 65 kg (143 lb 4.8 oz)  Constitutional: Elderly, chronically ill-appearing male aged pt in bed, no acute respiratory distress, patient appears comfortable on BiPAP  Head: - NCAT  Eyes: No pallor, Anicteric conjunctiva, EOMI.  ENMT:  Mallampati 3, no oral thrush. Dry MM.   NECK: Trachea midline, No thyromegaly, no palpable cervical LNpathy  Heart: RRR, no murmur. No pedal edema   Lungs: JANEL +, no wheezing, diminished lung sounds, diffuse rhonchi, nonlabored breathing  Abdomen: Soft. No tenderness, guarding or rigidity. No palpable masses  Extremities: Extremities warm and well perfused. No cyanosis/ clubbing  Neuro: Conscious, answers appropriately, no gross focal neuro deficits  Psych: Mood and affect appropriate    PPE recommended per [facility] " [facility] infectious disease Isolation protocol for the current clinical scenario(as mentioned below) was followed.      LABS:  COVID19   Date Value Ref Range Status   04/10/2025 Detected (C) Not Detected - Ref. Range Final       Lab Results   Component Value Date    CALCIUM 9.0 04/10/2025    PHOS 4.0 04/10/2025     Results from last 7 days   Lab Units 04/10/25  1711 04/10/25  1558   MAGNESIUM mg/dL  --  2.3   SODIUM mmol/L  --  139   POTASSIUM mmol/L  --  4.3   CHLORIDE mmol/L  --  100   CO2 mmol/L  --  30.8*   BUN mg/dL  --  20   CREATININE mg/dL  --  1.06   GLUCOSE mg/dL  --  131*   CALCIUM mg/dL  --  9.0   WBC 10*3/mm3  --  12.91*   HEMOGLOBIN g/dL  --  13.7   PLATELETS 10*3/mm3  --  257   ALT (SGPT) U/L  --  29   AST (SGOT) U/L  --  25   PROBNP pg/mL  --  1,824.0*   PROCALCITONIN ng/mL 0.06 0.07     Lab Results   Component Value Date    TROPONINI 0.011 05/04/2021    TROPONINT 32 (H) 04/10/2025     Results from last 7 days   Lab Units 04/10/25  1711 04/10/25  1558   HSTROP T ng/L 32* 32*         Results from last 7 days   Lab Units 04/10/25  1711 04/10/25  1558   PROCALCITONIN ng/mL 0.06 0.07   LACTATE mmol/L  --  1.4     Results from last 7 days   Lab Units 04/10/25  1756 04/10/25  1600   PH, ARTERIAL pH units 7.379 7.305*   PCO2, ARTERIAL mm Hg 51.1* 67.0*   PO2 ART mm Hg 76.5* 248.8*   O2 SATURATION ART % 94.6 99.8*   MODALITY  BiPap BiPap     Results from last 7 days   Lab Units 04/10/25  1559   ADENOVIRUS DETECTION BY PCR  Not Detected   CORONAVIRUS 229E  Not Detected   CORONAVIRUS HKU1  Not Detected   CORONAVIRUS NL63  Not Detected   CORONAVIRUS OC43  Not Detected   HUMAN METAPNEUMOVIRUS  Not Detected   HUMAN RHINOVIRUS/ENTEROVIRUS  Not Detected   INFLUENZA B PCR  Not Detected   PARAINFLUENZA 1  Not Detected   PARAINFLUENZA VIRUS 2  Not Detected   PARAINFLUENZA VIRUS 3  Not Detected   PARAINFLUENZA VIRUS 4  Not Detected   BORDETELLA PERTUSSIS PCR  Not Detected   BORDETELLA PARAPERTUSSIS PCR  Not Detected  "  CHLAMYDOPHILA PNEUMONIAE PCR  Not Detected   MYCOPLAMA PNEUMO PCR  Not Detected   RSV, PCR  Not Detected     Results from last 7 days   Lab Units 04/10/25  1558   INR  1.35*         No results found for: \"TSH\"  Estimated Creatinine Clearance: 51.1 mL/min (by C-G formula based on SCr of 1.06 mg/dL).      Microbiology Results (last 10 days)       Procedure Component Value - Date/Time    Respiratory Panel PCR w/COVID-19(SARS-CoV-2) KUSHAL/JAMI/MENA/PAD/COR/GARY In-House, NP Swab in UTM/VTM, 2 HR TAT - Swab, Nasopharynx [[id number]]  (Abnormal) Collected: 04/10/25 1559    Lab Status: Final result Specimen: Swab from Nasopharynx Updated: 04/10/25 6001     ADENOVIRUS, PCR Not Detected     Coronavirus 229E Not Detected     Coronavirus HKU1 Not Detected     Coronavirus NL63 Not Detected     Coronavirus OC43 Not Detected     COVID19 Detected     Human Metapneumovirus Not Detected     Human Rhinovirus/Enterovirus Not Detected     Influenza A PCR Not Detected     Influenza B PCR Not Detected     Parainfluenza Virus 1 Not Detected     Parainfluenza Virus 2 Not Detected     Parainfluenza Virus 3 Not Detected     Parainfluenza Virus 4 Not Detected     RSV, PCR Not Detected     Bordetella pertussis pcr Not Detected     Bordetella parapertussis PCR Not Detected     Chlamydophila pneumoniae PCR Not Detected     Mycoplasma pneumo by PCR Not Detected    Narrative:      In the setting of a positive respiratory panel with a viral infection PLUS a negative procalcitonin without other underlying concern for bacterial infection, consider observing off antibiotics or discontinuation of antibiotics and continue supportive care. If the respiratory panel is positive for atypical bacterial infection (Bordetella pertussis, Chlamydophila pneumoniae, or Mycoplasma pneumoniae), consider antibiotic de-escalation to target atypical bacterial infection.               Imaging: I personally visualized the images of scans/x-rays performed within last 3 " days.      Assessment:  Acute on chronic hypercapnic respiratory failure  Acute hypoxic respiratory failure  COVID-19 infection  COPD  Paroxysmal atrial fibrillation  Coronary artery disease  Ischemic cardiomyopathy  Hyperlipidemia  Hypertension    Recommendations:  Initial EKG showing partially compensated respiratory acidosis-patient was placed on BiPAP and FiO2 was weaned, repeat ABG showed compensation/improvement.  RVP positive for COVID-19.  Minimal leukocytosis with left shift-WBC 12.91, 77.0% neutrophils-however procalcitonin negative, would argue against secondary bacterial infection.  Would hold off on antibiotics at this time-monitor for development of fever.  Remdesivir and dexamethasone for COVID-19 confirmed infection.  Continue supplemental oxygen as needed to maintain SpO2 88 to 92%, avoid hyperoxia with chronic hypercapnia. Patient wears BiPAP with sleep and as needed at home-however does not have his home unit available.  Continue hospital V60 with sleep and as needed until patient is able to obtain his own unit.    Patient was placed in face mask upon entering room and kept mask on throughout our encounter. I wore full protective equipment throughout this patient encounter including a face mask, gown and gloves. Hand hygiene was performed before donning protective equipment and after removal when leaving the room.    Kylah Rosenthal, APRN  4/11/2025  01:03 EDT      Much of this encounter note is an electronic transcription/translation of spoken language to printed text using Dragon Software.

## 2025-04-11 NOTE — PROGRESS NOTES
[facility] Visit Report    Wisam Malone  [id number]  4/11/2025    PT WAS ADMITTED TO [facility] AT HOME ON 4/9/25. HE PRESENTED TO ED PRIOR TO A NURSE VISITING.   PT WAS VA PAY WITH [facility] AND AFTER REACHING OU TO [facility] CM KERRY SPOUSE CHOSE TO REVOCATE OUR SERVICES AT THIS TIME AS THIS IS A BULLING/INSURANCE ISSUE.     Pt did have 02 in home from \[facility]\"" and also [facility]. [facility] will  their o2. Pt will need to be assessed for 02 needs at d/c. Spouse and floor CM understood this.     I visited with pt spouse and her dtr outside of the room with facility RN and CM. Pt has been confused and anxious and spouse did not want me to attempt to explain to him as it could cause more confusion and anxiety. Forms were signed outside of the room.     Nia Hurtado RN  [facility] Health Visit Nurse  Scattered Bed Team

## 2025-04-11 NOTE — DISCHARGE PLACEMENT REQUEST
"Seun Leyva (79 y.o. Male)       Date of Birth   1945    Social Security Number       Address   [address] [place] [address]    Home Phone   [phone]    MRN   [id number]       Mormonism   None    Marital Status                               Admission Date   4/10/2025    Admission Type   Emergency    Admitting Provider   Aishwarya Srivastava MD    Attending Provider   Alexander Castle MD    Department, Room/Bed   [address], [address]/1       Discharge Date       Discharge Disposition       Discharge Destination                                 Attending Provider: Alexander Castle MD    Allergies: No Known Allergies    Isolation: Enh Drop/Con   Infection: COVID (confirmed) (04/10/25)   Code Status: No CPR    Ht: 185.4 cm (73\")   Wt: 65 kg (143 lb 4.8 oz)    Admission Cmt: None   Principal Problem: COVID-19 virus detected [U07.1]                   Active Insurance as of 4/10/2025       Primary Coverage       Payor Plan Insurance Group Employer/Plan Group    MEDICARE MEDICARE A & B        Payor Plan Address Payor Plan Phone Number Payor Plan Fax Number Effective Dates    [address] [phone]  12/1/2009 - None Entered    [facility] [id number]         Subscriber Name Subscriber Birth Date Member ID       SEUN LEYVA 1945 [id number]               Secondary Coverage       Payor Plan Insurance Group Employer/Plan Group     FOR LIFE  FOR LIFE [employer] SUP         Payor Plan Address Payor Plan Phone Number Payor Plan Fax Number Effective Dates    [address] [phone]  1/1/2023 - None Entered    [facility] [address]         Subscriber Name Subscriber Birth Date Member ID       SEUN LEYVA 1945 [id number]               Tertiary Coverage       Payor Plan Insurance Group Employer/Plan Group    [employer] [id number]        Payor Plan Address Payor Plan Phone Number Payor Plan Fax Number Effective Dates    [facility] OFFICE OF " [facility] [phone]  11/16/2023 - None Entered    [address] [address]       [facility] [address]         Subscriber Name Subscriber Birth Date Member ID       SEUN LEYVA 1945 [id number]                     Emergency Contacts        (Rel.) Home Phone Work Phone Mobile Phone    Nicki Leyva (Spouse) -- -- [phone]    Nicho Leyva (Son) [phone] -- --

## 2025-04-11 NOTE — PROGRESS NOTES
Name: Wisam Malone ADMIT: 4/10/2025   : 1945  PCP: Billie Ponce MD    MRN: [id number] LOS: 1 days   AGE/SEX: 79 y.o. male  ROOM: [facility]     Subjective   Subjective   Patient is seen at bedside, no new complaints.       Objective   Objective   Vital Signs  Temp:  [97.3 °F (36.3 °C)-98.4 °F (36.9 °C)] 97.5 °F (36.4 °C)  Heart Rate:  [75-92] 75  Resp:  [20-34] 24  BP: ()/(48-85) 108/57  SpO2:  [90 %-100 %] 97 %  on  Flow (L/min) (Oxygen Therapy):  [20] 20;   Device (Oxygen Therapy): NPPV/NIV  Body mass index is 18.91 kg/m².  Physical Exam  General, awake and alert.  Head and ENT, normocephalic and atraumatic.  Lungs, symmetric expansion, equal air entry bilaterally.  Heart, regular rate and rhythm.  Abdomen, soft and nontender.  Extremities, no clubbing or cyanosis.  Neuro, no focal deficits.  Skin: Warm and no rash.  Psych, normal mood and affect.  Musculoskeletal, joint examination is grossly normal.      Results Review     I reviewed the patient's new clinical results.  Results from last 7 days   Lab Units 25  0528 04/10/25  1558   WBC 10*3/mm3 4.45 12.91*   HEMOGLOBIN g/dL 12.1* 13.7   PLATELETS 10*3/mm3 178 257     Results from last 7 days   Lab Units 25  0528 04/10/25  1558   SODIUM mmol/L 141 139   POTASSIUM mmol/L 5.2 4.3   CHLORIDE mmol/L 104 100   CO2 mmol/L 29.6* 30.8*   BUN mg/dL 25* 20   CREATININE mg/dL 1.05 1.06   GLUCOSE mg/dL 173* 131*   EGFR mL/min/1.73 71.8 70.9     Results from last 7 days   Lab Units 25  0528 04/10/25  1558   ALBUMIN g/dL 3.2* 3.9   BILIRUBIN mg/dL <0.2 0.5   ALK PHOS U/L 118* 157*   AST (SGOT) U/L 17 25   ALT (SGPT) U/L 24 29     Results from last 7 days   Lab Units 25  0528 04/10/25  1558   CALCIUM mg/dL 8.7 9.0   ALBUMIN g/dL 3.2* 3.9   MAGNESIUM mg/dL 2.1 2.3   PHOSPHORUS mg/dL 3.1 4.0     Results from last 7 days   Lab Units 04/10/25  1711 04/10/25  1558   PROCALCITONIN ng/mL 0.06 0.07   LACTATE mmol/L  --  1.4     No results  "found for: \"HGBA1C\", \"POCGLU\"    XR Chest 1 View  Result Date: 4/10/2025  No focal pulmonary consolidation. Follow-up as clinical indications persist.  This report was finalized on 4/10/2025 4:50 PM by Dr. Jarred Albarado M.D on Workstation: [employer]        I have personally reviewed all medications:  Scheduled Medications  apixaban, 5 mg, Oral, Q12H  aspirin, 81 mg, Oral, Daily  atorvastatin, 80 mg, Oral, Daily  [START ON 4/12/2025] azithromycin, 250 mg, Oral, Q24H  budesonide-formoterol, 2 puff, Inhalation, BID - RT  dexAMETHasone, 6 mg, Oral, Daily   Or  dexAMETHasone, 6 mg, Intravenous, Daily  famotidine, 20 mg, Oral, BID  guaiFENesin, 600 mg, Oral, Q12H  ipratropium-albuterol, 3 mL, Nebulization, Q4H - RT  metoprolol tartrate, 25 mg, Oral, BID  polyethylene glycol, 17 g, Oral, Daily  remdesivir, 100 mg, Intravenous, Q24H  vitamin B-12, 1,000 mcg, Oral, Daily    Infusions  Pharmacy Consult - Remdesivir,     Diet  Diet: Cardiac; Healthy Heart (2-3 Na+); Fluid Consistency: Thin (IDDSI 0)    I have personally reviewed:  [x]  Laboratory   [x]  Microbiology   [x]  Radiology   [x]  EKG/Telemetry  [x]  Cardiology/Vascular   []  Pathology    []  Records       Assessment/Plan     Active Hospital Problems    Diagnosis  POA    **COVID-19 virus detected [U07.1]  Yes    Acute on chronic respiratory failure with hypoxia and hypercapnia [J96.21, J96.22]  Yes    Hypertension [I10]  Yes    Ischemic cardiomyopathy [I25.5]  Yes    Paroxysmal atrial fibrillation [I48.0]  Yes    COPD exacerbation [J44.1]  Yes      Resolved Hospital Problems   No resolved problems to display.       79 y.o. male admitted with COVID-19 virus detected.    Mr. Malone is a 79 y.o. former smoker with a history of chronic respiratory failure with hypoxia and hypercapnia, COPD, HTN, ischemic cardiomyopathy, PAF on Eliquis that presents to [facility] complaining of worsening shortness of breath      COVID-19 infection  Acute on chronic " hypercapnic respiratory failure  Acute hypoxic respiratory failure  - Respiratory panel on admission was positive for COVID-19.  ABG showed elevated pCO2 at 67.0.  pH of 7.305.  Was initiated on BiPAP, repeat pH improved up to 7.379, pCO2 decreased down to 51.1.  - X-ray on admission with no focal pulmonary consolidation. Follow-up as clinical indications persist.  - WBC mildly elevated at 12.91.  Procalcitonin normal.  Afebrile.  Patient received IV ceftriaxone in the ED, however low suspicion for superimposed bacterial infection currently.  Will monitor closely.  - Initiated on remdesivir, dexamethasone.  -O2 supplement to maintain saturation 88 to 92%  In the setting of holding hypercapnia  -Nebulizers,   -Mucinex, incentive spirometer  - V 60 BiPAP  - Pulmonology consult        PAF  Coronary artery disease  Ischemic cardiomyopathy  -Resume metoprolol titrate 25 mg twice daily.  Eliquis 5 mg twice daily.  - Aspirin, statin     Expected Discharge Date: 4/14/2025; Expected Discharge Time:       Alexander Castle MD  [facility]  04/11/25  14:21 EDT

## 2025-04-11 NOTE — H&P
Patient Name:  Wisam Malone  YOB: 1945  MRN:  [id number]  Admit Date:  4/10/2025  Patient Care Team:  System, Provider Not In as PCP - General  Aishwarya Srivastava MD as Consulting Physician (Internal Medicine)      Subjective   History Present Illness     Chief Complaint   Patient presents with    Shortness of Breath     History of Present Illness  Mr. Malone is a 79 y.o. former smoker with a history of chronic respiratory failure with hypoxia and hypercapnia, COPD, HTN, ischemic cardiomyopathy, PAF on Eliquis that presents to [facility] complaining of worsening shortness of breath.  Patient is on BiPAP at home and reports that he had an acute onset of worsening shortness of breath today.  He endorses a productive cough at night only.  Denies any fever or chills.  He receives his care from the [facility] and has been evaluated by hospice yesterday. Respiratory viral panel obtained in the ED is positive for COVID-19.  We have been asked admit for further treatment.    Review of Systems   Constitutional:  Negative for chills and fever.   HENT:  Negative for congestion and rhinorrhea.    Eyes:  Negative for photophobia and visual disturbance.   Respiratory:  Positive for cough and shortness of breath.    Cardiovascular:  Negative for chest pain and palpitations.   Gastrointestinal:  Negative for constipation, diarrhea, nausea and vomiting.   Endocrine: Negative for cold intolerance and heat intolerance.   Genitourinary:  Negative for difficulty urinating and dysuria.   Musculoskeletal:  Negative for gait problem and joint swelling.   Skin:  Negative for rash and wound.   Neurological:  Negative for dizziness, light-headedness and headaches.   Psychiatric/Behavioral:  Negative for sleep disturbance and suicidal ideas.         Personal History     Past Medical History:   Diagnosis Date    COPD (chronic obstructive pulmonary disease)     Elevated cholesterol     GERD  (gastroesophageal reflux disease)     Hypertension      Past Surgical History:   Procedure Laterality Date    CORONARY ARTERY BYPASS GRAFT  1996     History reviewed. No pertinent family history.  Social History     Tobacco Use    Smoking status: Former     Average packs/day: 1 pack/day for 62.0 years (62.0 ttl pk-yrs)     Types: Cigarettes     Start date: 1960    Smokeless tobacco: Never   Vaping Use    Vaping status: Never Used   Substance Use Topics    Alcohol use: Never    Drug use: Never     No current facility-administered medications on file prior to encounter.     Current Outpatient Medications on File Prior to Encounter   Medication Sig Dispense Refill    apixaban (ELIQUIS) 5 MG tablet tablet Take 1 tablet by mouth Every 12 (Twelve) Hours.      aspirin 81 MG EC tablet Take 1 tablet by mouth Daily.      atorvastatin (LIPITOR) 80 MG tablet Take 1 tablet by mouth Daily.      famotidine (PEPCID) 20 MG tablet Take 1 tablet by mouth 2 (Two) Times a Day.      folic acid (FOLVITE) 1 MG tablet Take 1 tablet by mouth Daily.      guaiFENesin (MUCINEX) 600 MG 12 hr tablet Take 2 tablets by mouth 2 (Two) Times a Day.      metoprolol tartrate (LOPRESSOR) 25 MG tablet Take 1 tablet by mouth 2 (Two) Times a Day. 60 tablet 3    sacubitril-valsartan (ENTRESTO) 24-26 MG tablet Take 1 tablet by mouth 2 (Two) Times a Day.      vitamin B-12 (CYANOCOBALAMIN) 500 MCG tablet Take 2 tablets by mouth Daily.      vitamin D (ERGOCALCIFEROL) 1.25 MG (31350 UT) capsule capsule Take 1 capsule by mouth 1 (One) Time Per Week.      albuterol sulfate  (90 Base) MCG/ACT inhaler Inhale 2 puffs Every 4 (Four) Hours As Needed for Wheezing.      budesonide-formoterol (SYMBICORT) 160-4.5 MCG/ACT inhaler Inhale 2 puffs 2 (Two) Times a Day. 10.2 g 0    fluticasone (FLONASE) 50 MCG/ACT nasal spray 2 sprays into the nostril(s) as directed by provider Daily.      polyethylene glycol (MIRALAX) 17 g packet Take 17 g by mouth Daily.      tiotropium  bromide monohydrate (SPIRIVA RESPIMAT) 2.5 MCG/ACT aerosol solution inhaler Inhale 2 puffs Daily.       No Known Allergies    Objective    Objective     Vital Signs  Temp:  [97.3 °F (36.3 °C)-98.4 °F (36.9 °C)] 98.4 °F (36.9 °C)  Heart Rate:  [80-92] 80  Resp:  [20-34] 22  BP: ()/(53-85) 95/55  SpO2:  [90 %-100 %] 95 %  on  Flow (L/min) (Oxygen Therapy):  [20] 20;   Device (Oxygen Therapy): NPPV/NIV  Body mass index is 18.91 kg/m².    Physical Exam  Vitals and nursing note reviewed.   Constitutional:       General: He is not in acute distress.     Appearance: He is well-developed. He is not toxic-appearing.      Comments: Frail, chronically ill-appearing   HENT:      Head: Normocephalic and atraumatic.   Eyes:      General: No scleral icterus.        Right eye: No discharge.         Left eye: No discharge.      Conjunctiva/sclera: Conjunctivae normal.   Neck:      Vascular: No JVD.   Cardiovascular:      Rate and Rhythm: Normal rate and regular rhythm.      Heart sounds: Normal heart sounds. No murmur heard.     No friction rub. No gallop.   Pulmonary:      Effort: Pulmonary effort is normal. Accessory muscle usage present. No respiratory distress.      Breath sounds: Decreased breath sounds and rhonchi present. No wheezing or rales.   Abdominal:      General: Bowel sounds are normal. There is no distension.      Palpations: Abdomen is soft.      Tenderness: There is no abdominal tenderness. There is no guarding.   Musculoskeletal:         General: No tenderness or deformity. Normal range of motion.      Cervical back: Normal range of motion and neck supple.   Skin:     General: Skin is warm and dry.      Capillary Refill: Capillary refill takes less than 2 seconds.   Neurological:      Mental Status: He is alert and oriented to person, place, and time.   Psychiatric:         Behavior: Behavior normal.     Results Review:  I reviewed the patient's new clinical results.  I reviewed the patient's new imaging  results and agree with the interpretation.  I reviewed the patient's other test results and agree with the interpretation  I personally viewed and interpreted the patient's EKG/Telemetry data    Lab Results (last 24 hours)       Procedure Component Value Units Date/Time    CBC & Differential [[id number]]  (Abnormal) Collected: 04/10/25 1558    Specimen: Blood Updated: 04/10/25 1611    Narrative:      The following orders were created for panel order CBC & Differential.  Procedure                               Abnormality         Status                     ---------                               -----------         ------                     CBC Auto Differential[[id number]]        Abnormal            Final result                 Please view results for these tests on the individual orders.    Comprehensive Metabolic Panel [[id number]]  (Abnormal) Collected: 04/10/25 1558    Specimen: Blood Updated: 04/10/25 1638     Glucose 131 mg/dL      BUN 20 mg/dL      Creatinine 1.06 mg/dL      Sodium 139 mmol/L      Potassium 4.3 mmol/L      Chloride 100 mmol/L      CO2 30.8 mmol/L      Calcium 9.0 mg/dL      Total Protein 7.1 g/dL      Albumin 3.9 g/dL      ALT (SGPT) 29 U/L      AST (SGOT) 25 U/L      Alkaline Phosphatase 157 U/L      Total Bilirubin 0.5 mg/dL      Globulin 3.2 gm/dL      A/G Ratio 1.2 g/dL      BUN/Creatinine Ratio 18.9     Anion Gap 8.2 mmol/L      eGFR 70.9 mL/min/1.73     Narrative:      GFR Categories in Chronic Kidney Disease (CKD)      GFR Category          GFR (mL/min/1.73)    Interpretation  G1                     90 or greater         Normal or high (1)  G2                      60-89                Mild decrease (1)  G3a                   45-59                Mild to moderate decrease  G3b                   30-44                Moderate to severe decrease  G4                    15-29                Severe decrease  G5                    14 or less           Kidney failure          (1)In the absence of  evidence of kidney disease, neither GFR category G1 or G2 fulfill the criteria for CKD.    eGFR calculation 2021 CKD-EPI creatinine equation, which does not include race as a factor    Protime-INR [[id number]]  (Abnormal) Collected: 04/10/25 1558    Specimen: Blood Updated: 04/10/25 1626     Protime 16.7 Seconds      INR 1.35    BNP [[id number]]  (Abnormal) Collected: 04/10/25 1558    Specimen: Blood Updated: 04/10/25 1644     proBNP 1,824.0 pg/mL     Narrative:      This assay is used as an aid in the diagnosis of individuals suspected of having heart failure. It can be used as an aid in the diagnosis of acute decompensated heart failure (ADHF) in patients presenting with signs and symptoms of ADHF to the emergency department (ED). In addition, NT-proBNP of <300 pg/mL indicates ADHF is not likely.    Age Range Result Interpretation  NT-proBNP Concentration (pg/mL:      <50             Positive            >450                   Gray                 300-450                    Negative             <300    50-75           Positive            >900                  Gray                300-900                  Negative            <300      >75             Positive            >1800                  Gray                300-1800                  Negative            <300    High Sensitivity Troponin T [[id number]]  (Abnormal) Collected: 04/10/25 1558    Specimen: Blood Updated: 04/10/25 1644     HS Troponin T 32 ng/L     Narrative:      High Sensitive Troponin T Reference Range:  <14.0 ng/L- Negative Female for AMI  <22.0 ng/L- Negative Male for AMI  >=14 - Abnormal Female indicating possible myocardial injury.  >=22 - Abnormal Male indicating possible myocardial injury.   Clinicians would have to utilize clinical acumen, EKG, Troponin, and serial changes to determine if it is an Acute Myocardial Infarction or myocardial injury due to an underlying chronic condition.         Lactic Acid, Plasma [[id number]]  (Normal) Collected:  "04/10/25 1558    Specimen: Blood Updated: 04/10/25 1631     Lactate 1.4 mmol/L     Procalcitonin [[id number]]  (Normal) Collected: 04/10/25 1558    Specimen: Blood Updated: 04/10/25 1644     Procalcitonin 0.07 ng/mL     Narrative:      As a Marker for Sepsis (Non-Neonates):    1. <0.5 ng/mL represents a low risk of severe sepsis and/or septic shock.  2. >2 ng/mL represents a high risk of severe sepsis and/or septic shock.    As a Marker for Lower Respiratory Tract Infections that require antibiotic therapy:    PCT on Admission    Antibiotic Therapy       6-12 Hrs later    >0.5                Strongly Recommended  >0.25 - <0.5        Recommended   0.1 - 0.25          Discouraged              Remeasure/reassess PCT  <0.1                Strongly Discouraged     Remeasure/reassess PCT    As 28 day mortality risk marker: \"Change in Procalcitonin Result\" (>80% or <=80%) if Day 0 (or Day 1) and Day 4 values are available. Refer to http://[email or web address] [place]-pct-[email or web address]    Change in PCT <=80%  A decrease of PCT levels below or equal to 80% defines a positive change in PCT test result representing a higher risk for 28-day all-cause mortality of patients diagnosed with severe sepsis for septic shock.    Change in PCT >80%  A decrease of PCT levels of more than 80% defines a negative change in PCT result representing a lower risk for 28-day all-cause mortality of patients diagnosed with severe sepsis or septic shock.       Ethanol [[id number]] Collected: 04/10/25 1558    Specimen: Blood Updated: 04/10/25 1638     Ethanol <10 mg/dL      Ethanol % <0.010 %     Magnesium [[id number]]  (Normal) Collected: 04/10/25 1558    Specimen: Blood Updated: 04/10/25 1638     Magnesium 2.3 mg/dL     Phosphorus [[id number]]  (Normal) Collected: 04/10/25 1558    Specimen: Blood Updated: 04/10/25 1638     Phosphorus 4.0 mg/dL     CBC Auto Differential [[id number]]  (Abnormal) Collected: 04/10/25 1558    Specimen: Blood Updated: 04/10/25 1611     WBC " 12.91 10*3/mm3      RBC 4.79 10*6/mm3      Hemoglobin 13.7 g/dL      Hematocrit 43.4 %      MCV 90.6 fL      MCH 28.6 pg      MCHC 31.6 g/dL      RDW 12.1 %      RDW-SD 40.4 fl      MPV 9.0 fL      Platelets 257 10*3/mm3      Neutrophil % 77.0 %      Lymphocyte % 12.2 %      Monocyte % 9.2 %      Eosinophil % 0.6 %      Basophil % 0.5 %      Immature Grans % 0.5 %      Neutrophils, Absolute 9.92 10*3/mm3      Lymphocytes, Absolute 1.58 10*3/mm3      Monocytes, Absolute 1.19 10*3/mm3      Eosinophils, Absolute 0.08 10*3/mm3      Basophils, Absolute 0.07 10*3/mm3      Immature Grans, Absolute 0.07 10*3/mm3      nRBC 0.0 /100 WBC     Respiratory Panel PCR w/COVID-19(SARS-CoV-2) KUSHAL/JAMI/MENA/PAD/COR/GARY In-House, NP Swab in UTM/VTM, 2 HR TAT - Swab, Nasopharynx [[id number]]  (Abnormal) Collected: 04/10/25 1559    Specimen: Swab from Nasopharynx Updated: 04/10/25 9898     ADENOVIRUS, PCR Not Detected     Coronavirus 229E Not Detected     Coronavirus HKU1 Not Detected     Coronavirus NL63 Not Detected     Coronavirus OC43 Not Detected     COVID19 Detected     Human Metapneumovirus Not Detected     Human Rhinovirus/Enterovirus Not Detected     Influenza A PCR Not Detected     Influenza B PCR Not Detected     Parainfluenza Virus 1 Not Detected     Parainfluenza Virus 2 Not Detected     Parainfluenza Virus 3 Not Detected     Parainfluenza Virus 4 Not Detected     RSV, PCR Not Detected     Bordetella pertussis pcr Not Detected     Bordetella parapertussis PCR Not Detected     Chlamydophila pneumoniae PCR Not Detected     Mycoplasma pneumo by PCR Not Detected    Narrative:      In the setting of a positive respiratory panel with a viral infection PLUS a negative procalcitonin without other underlying concern for bacterial infection, consider observing off antibiotics or discontinuation of antibiotics and continue supportive care. If the respiratory panel is positive for atypical bacterial infection (Bordetella pertussis,  Chlamydophila pneumoniae, or Mycoplasma pneumoniae), consider antibiotic de-escalation to target atypical bacterial infection.    Blood Gas, Arterial - [[id number]]  (Abnormal) Collected: 04/10/25 1600    Specimen: Arterial Blood Updated: 04/10/25 1604     Site Left Radial     Paul's Test Positive     pH, Arterial 7.305 pH units      pCO2, Arterial 67.0 mm Hg      pO2, Arterial 248.8 mm Hg      HCO3, Arterial 33.4 mmol/L      Base Excess, Arterial 4.4 mmol/L      Comment: Serial Number: [id number]:  [id number]        O2 Saturation, Arterial 99.8 %      A-a DO2 0.9 mmHg      Barometric Pressure for Blood Gas 749.1000 mmHg      Modality BiPap     FIO2 50 %      Ventilator Mode NIV     Set [facility] Resp Rate 20     Rate 31 Breaths/minute      PEEP 5     Notified Who Dr Plummer     Read Back Yes     Notified Time --     Hemodilution No     Inspiratory Time 1     Device Comment AVAPS 14-30 epap 5. target 550 returning 356     PO2/FIO2 498    Blood Culture - Blood, Arm, Right [[id number]] Collected: 04/10/25 1645    Specimen: Blood from Arm, Right Updated: 04/10/25 1649    Blood Culture - Blood, Arm, Left [[id number]] Collected: 04/10/25 1711    Specimen: Blood from Arm, Left Updated: 04/10/25 1715    High Sensitivity Troponin T 1Hr [[id number]]  (Abnormal) Collected: 04/10/25 1711    Specimen: Blood from Arm, Left Updated: 04/10/25 1741     HS Troponin T 32 ng/L      Troponin T Numeric Delta 0 ng/L      Troponin T % Delta 0    Narrative:      High Sensitive Troponin T Reference Range:  <14.0 ng/L- Negative Female for AMI  <22.0 ng/L- Negative Male for AMI  >=14 - Abnormal Female indicating possible myocardial injury.  >=22 - Abnormal Male indicating possible myocardial injury.   Clinicians would have to utilize clinical acumen, EKG, Troponin, and serial changes to determine if it is an Acute Myocardial Infarction or myocardial injury due to an underlying chronic condition.         Procalcitonin [[id number]]  (Normal) Collected:  "04/10/25 1711    Specimen: Blood from Arm, Left Updated: 04/10/25 2241     Procalcitonin 0.06 ng/mL     Narrative:      As a Marker for Sepsis (Non-Neonates):    1. <0.5 ng/mL represents a low risk of severe sepsis and/or septic shock.  2. >2 ng/mL represents a high risk of severe sepsis and/or septic shock.    As a Marker for Lower Respiratory Tract Infections that require antibiotic therapy:    PCT on Admission    Antibiotic Therapy       6-12 Hrs later    >0.5                Strongly Recommended  >0.25 - <0.5        Recommended   0.1 - 0.25          Discouraged              Remeasure/reassess PCT  <0.1                Strongly Discouraged     Remeasure/reassess PCT    As 28 day mortality risk marker: \"Change in Procalcitonin Result\" (>80% or <=80%) if Day 0 (or Day 1) and Day 4 values are available. Refer to http://[email or web address]-pct-[email or web address].com    Change in PCT <=80%  A decrease of PCT levels below or equal to 80% defines a positive change in PCT test result representing a higher risk for 28-day all-cause mortality of patients diagnosed with severe sepsis for septic shock.    Change in PCT >80%  A decrease of PCT levels of more than 80% defines a negative change in PCT result representing a lower risk for 28-day all-cause mortality of patients diagnosed with severe sepsis or septic shock.       Blood Gas, Arterial - [[id number]]  (Abnormal) Collected: 04/10/25 1756    Specimen: Arterial Blood Updated: 04/10/25 1802     Site Right Radial     Paul's Test Positive     pH, Arterial 7.379 pH units      pCO2, Arterial 51.1 mm Hg      pO2, Arterial 76.5 mm Hg      HCO3, Arterial 30.1 mmol/L      Base Excess, Arterial 3.9 mmol/L      Comment: Serial Number: [id number]:  [id number]        O2 Saturation, Arterial 94.6 %      A-a DO2 0.5 mmHg      Barometric Pressure for Blood Gas 748.2000 mmHg      Modality BiPap     FIO2 28 %      Ventilator Mode NIV     Set [facility] Resp Rate 20     Rate 32 Breaths/minute      PEEP 5     " Hemodilution No     Inspiratory Time 1     Device Comment AVAPS 14-32 epap 5 target 550 sats 97%     PO2/FIO2 273            Imaging Results (Last 24 Hours)       Procedure Component Value Units Date/Time    XR Chest 1 View [[id number]] Collected: 04/10/25 1647     Updated: 04/10/25 1653    Narrative:      XR CHEST 1 VW-     HISTORY: Male who is 79 years-old, short of breath     TECHNIQUE: Frontal views of the chest     COMPARISON: 4/4/2024     FINDINGS: Heart, mediastinum and pulmonary vasculature are unremarkable.  Sternotomy wires are present. No focal pulmonary consolidation, pleural  effusion, or pneumothorax. Gas-filled loops of bowel are partly  included. No acute osseous process.       Impression:      No focal pulmonary consolidation. Follow-up as clinical  indications persist.     This report was finalized on 4/10/2025 4:50 PM by Dr. Jarred Albarado M.D on Workstation: [id number]               Results for orders placed during the hospital encounter of 11/16/23    Adult Transthoracic Echo Complete W/ Cont if Necessary Per Protocol    Interpretation Summary    There is a small to moderate size layered thrombus in the anterior apex    The left ventricular cavity is mild to moderately dilated.    The anteroseptum and apex are thinned, consistent with a prior infarct. There is akinesis of the mid to distal anteroseptum and the entire apex.    Left ventricular systolic function is moderately decreased. Left ventricular ejection fraction appears to be 36 - 40%.    Left ventricular diastolic function is consistent with (grade I) impaired relaxation.    Normal right ventricular cavity size and systolic function noted.    The left atrial cavity is mildly dilated.    Calculated right ventricular systolic pressure from tricuspid regurgitation is 29 mmHg.    There is no evidence of pericardial effusion.      ECG 12 Lead Dyspnea   Final Result   HEART RATE=94  bpm   RR Kmzdedfs=489  ms   AL Iylmscet=087  ms   P  Horizontal Axis=-44  deg   P Front Axis=54  deg   QRSD Dhrjjbis=836  ms   QT Evwwixss=952  ms   BWjK=727  ms   QRS Axis=-57  deg   T Wave Axis=127  deg   - ABNORMAL ECG -   Sinus rhythm   Multiform ventricular premature complexes   Probable  left atrial enlargement   Left bundle branch block   The appearance of BBB may be rate related   When compared with ECG of 05-Apr-2024 23:48:57,   Significant rate increase with new PVCs and LBBB   Electronically Signed By: Duane Winslow ([facility]) 2025-04-10 22:36:08   Date and Time of Study:2025-04-10 15:51:10           Assessment/Plan     Active Hospital Problems    Diagnosis  POA    **COVID-19 virus detected [U07.1]  Yes    Acute on chronic respiratory failure with hypoxia and hypercapnia [J96.21, J96.22]  Yes    Hypertension [I10]  Yes    Ischemic cardiomyopathy [I25.5]  Yes    Paroxysmal atrial fibrillation [I48.0]  Yes    COPD exacerbation [J44.1]  Yes      Resolved Hospital Problems   No resolved problems to display.       Mr. Malone is a 79 y.o. former smoker with a history of chronic respiratory failure, HTN, ischemic cardiomyopathy, PAF, COPD who presents with worsening dyspnea was found to have COVID-19.    Acute on chronic respiratory failure secondary to COVID-19   COPD  -Decadron 6 mg daily  -Start remdesivir  -On BiPAP/NIPPV at home, consult pulmonary  -Trend inflammatory markers  - Continue home dose of Eliquis    PAF  CAD  Ischemic cardiomyopathy  HLD  -Rate controlled, continue Lopressor, aspirin, and Lipitor    Essential hypertension  -Stable, resume home regimen       I discussed the patient's findings and my recommendations with patient and family.    VTE Prophylaxis - Eliquis (home med).  Code Status - DNR/DNI       ZAIDA Hernandez  [facility]  04/10/25  19:00 EDT

## 2025-04-11 NOTE — PROGRESS NOTES
"Nutrition Services    Patient Name: Wisam Malone  YOB: 1945  MRN: [id number]  Admission date: 4/10/2025    Assessment Date:  04/11/25    NUTRITION EVALUATION      Reason for Encounter BMI   Diagnosis/Problem Admission Diagnosis:  Acute respiratory failure with hypoxia and hypercapnia [J96.01, J96.02]  COVID-19 [U07.1]    Problem List:    COVID-19 virus detected    COPD exacerbation    Hypertension    Ischemic cardiomyopathy    Paroxysmal atrial fibrillation    Acute on chronic respiratory failure with hypoxia and hypercapnia     Narrative 78 y/o male with history of COPD in hospice care presented with c/o acute SOB and cough.+COVID-19.   History reviewed, previous admission drank boost will initiate this today.        PO Diet Diet: Cardiac; Healthy Heart (2-3 Na+); Fluid Consistency: Thin (IDDSI 0)   Allergies NKFA   Supplements Other: Boost BID    PO Intake % 75%       Chewing/Swallowing Difficulty no issues identified at this time       Medications reviewed   Labs  reviewed       Physical Findings appeared asleep     Edema no edema    GI Function last bowel movement: 4/11/2025   Skin Status skin intact    Lines/Drains none   I/O reviewed        Height  Weight  BMI  Weight Trend     Height: 185.4 cm (73\")  Weight: 65 kg (143 lb 4.8 oz) (04/10/25 2315)  Body mass index is 18.91 kg/m².  Loss    Weight change: weight loss of 6 lbs (5%) over 1 year(s)    Significant?  No       NFPE Not indicated at this time       Nutrition Problem (PES) Problem: Increased Nutrient Needs  Etiology: Medical Diagnosis - COPD    Signs/Symptoms: BMI       Intervention/Plan Boost Original BID (Provides 480 kcals, 20 g protein if consumed)   Encourage PO intake     RD to follow up per protocol.     Results from last 7 days   Lab Units 04/11/25  0528 04/10/25  1558   SODIUM mmol/L 141 139   POTASSIUM mmol/L 5.2 4.3   CHLORIDE mmol/L 104 100   CO2 mmol/L 29.6* 30.8*   BUN mg/dL 25* 20   CREATININE mg/dL 1.05 1.06   CALCIUM " mg/dL 8.7 9.0   BILIRUBIN mg/dL <0.2 0.5   ALK PHOS U/L 118* 157*   ALT (SGPT) U/L 24 29   AST (SGOT) U/L 17 25   GLUCOSE mg/dL 173* 131*     Results from last 7 days   Lab Units 04/11/25  0528 04/10/25  1558   MAGNESIUM mg/dL 2.1 2.3   PHOSPHORUS mg/dL 3.1 4.0   HEMOGLOBIN g/dL 12.1* 13.7   HEMATOCRIT % 37.1* 43.4     Lab Results   Component Value Date    HGBA1C 5.90 (H) 04/04/2024     Wt Readings from Last 10 Encounters:   04/10/25 65 kg (143 lb 4.8 oz)   04/08/24 68 kg (149 lb 14.6 oz)   03/08/24 67.9 kg (149 lb 9.6 oz)   11/20/23 74.5 kg (164 lb 3.9 oz)       Electronically signed by:  Lorie Martins RD  04/11/25 09:33 EDT

## 2025-04-11 NOTE — SIGNIFICANT NOTE
04/11/25 1547   OTHER   Discipline physical therapist   Rehab Time/Intention   Session Not Performed other (see comments)  (Pt on bipap upon arrival, decline PT needs at this time, reports he spends most of his day in his chair which he sleeps in and has assist to ambulate short distance to bathroom. per charting noted plan for home with hospice. acute PT will s/o)   Therapy Assessment/Plan (PT)   Criteria for Skilled Interventions Met (PT) no problems identified which require skilled intervention

## 2025-04-11 NOTE — SIGNIFICANT NOTE
04/11/25 1248   OTHER   Discipline occupational therapist   Rehab Time/Intention   Session Not Performed other (see comments)  (OT Lorena giles, RN ok'd. Pt observed in bed wearing bipap, declining to perform OOB act or BSC xfer today despite encouragement. Pt presents to be quite sedentary at baseline, reporting he sleeps in recliner, doesn't leave home, only up to BR using rwx very short distance and receives assist from wife as needed for ADLs. No further needs/ concerns for OT at this time when asked. OT will s/o )   Therapy Assessment/Plan (PT)   Criteria for Skilled Interventions Met (PT) no;does not meet criteria for skilled intervention

## 2025-04-12 LAB
ALBUMIN SERPL-MCNC: 3.1 G/DL (ref 3.5–5.2)
ALBUMIN/GLOB SERPL: 1.2 G/DL
ALP SERPL-CCNC: 137 U/L (ref 39–117)
ALT SERPL W P-5'-P-CCNC: 25 U/L (ref 1–41)
ANION GAP SERPL CALCULATED.3IONS-SCNC: 7.1 MMOL/L (ref 5–15)
AST SERPL-CCNC: 20 U/L (ref 1–40)
BASOPHILS # BLD AUTO: 0.03 10*3/MM3 (ref 0–0.2)
BASOPHILS NFR BLD AUTO: 0.4 % (ref 0–1.5)
BILIRUB SERPL-MCNC: <0.2 MG/DL (ref 0–1.2)
BUN SERPL-MCNC: 30 MG/DL (ref 8–23)
BUN/CREAT SERPL: 28.3 (ref 7–25)
CALCIUM SPEC-SCNC: 8.4 MG/DL (ref 8.6–10.5)
CHLORIDE SERPL-SCNC: 105 MMOL/L (ref 98–107)
CO2 SERPL-SCNC: 29.9 MMOL/L (ref 22–29)
CREAT SERPL-MCNC: 1.06 MG/DL (ref 0.76–1.27)
CRP SERPL-MCNC: 0.98 MG/DL (ref 0–0.5)
DEPRECATED RDW RBC AUTO: 39.5 FL (ref 37–54)
EGFRCR SERPLBLD CKD-EPI 2021: 70.9 ML/MIN/1.73
EOSINOPHIL # BLD AUTO: 0.04 10*3/MM3 (ref 0–0.4)
EOSINOPHIL NFR BLD AUTO: 0.6 % (ref 0.3–6.2)
ERYTHROCYTE [DISTWIDTH] IN BLOOD BY AUTOMATED COUNT: 12.2 % (ref 12.3–15.4)
GLOBULIN UR ELPH-MCNC: 2.5 GM/DL
GLUCOSE SERPL-MCNC: 78 MG/DL (ref 65–99)
HCT VFR BLD AUTO: 36.8 % (ref 37.5–51)
HGB BLD-MCNC: 12.2 G/DL (ref 13–17.7)
IMM GRANULOCYTES # BLD AUTO: 0.05 10*3/MM3 (ref 0–0.05)
IMM GRANULOCYTES NFR BLD AUTO: 0.7 % (ref 0–0.5)
LYMPHOCYTES # BLD AUTO: 1.21 10*3/MM3 (ref 0.7–3.1)
LYMPHOCYTES NFR BLD AUTO: 16.7 % (ref 19.6–45.3)
MCH RBC QN AUTO: 29.4 PG (ref 26.6–33)
MCHC RBC AUTO-ENTMCNC: 33.2 G/DL (ref 31.5–35.7)
MCV RBC AUTO: 88.7 FL (ref 79–97)
MONOCYTES # BLD AUTO: 0.68 10*3/MM3 (ref 0.1–0.9)
MONOCYTES NFR BLD AUTO: 9.4 % (ref 5–12)
NEUTROPHILS NFR BLD AUTO: 5.22 10*3/MM3 (ref 1.7–7)
NEUTROPHILS NFR BLD AUTO: 72.2 % (ref 42.7–76)
NRBC BLD AUTO-RTO: 0 /100 WBC (ref 0–0.2)
PLATELET # BLD AUTO: 176 10*3/MM3 (ref 140–450)
PMV BLD AUTO: 9.2 FL (ref 6–12)
POTASSIUM SERPL-SCNC: 4.2 MMOL/L (ref 3.5–5.2)
PROT SERPL-MCNC: 5.6 G/DL (ref 6–8.5)
RBC # BLD AUTO: 4.15 10*6/MM3 (ref 4.14–5.8)
SODIUM SERPL-SCNC: 142 MMOL/L (ref 136–145)
WBC NRBC COR # BLD AUTO: 7.23 10*3/MM3 (ref 3.4–10.8)

## 2025-04-12 PROCEDURE — 94799 UNLISTED PULMONARY SVC/PX: CPT

## 2025-04-12 PROCEDURE — 94761 N-INVAS EAR/PLS OXIMETRY MLT: CPT

## 2025-04-12 PROCEDURE — 94640 AIRWAY INHALATION TREATMENT: CPT

## 2025-04-12 PROCEDURE — 94664 DEMO&/EVAL PT USE INHALER: CPT

## 2025-04-12 PROCEDURE — 80053 COMPREHEN METABOLIC PANEL: CPT | Performed by: NURSE PRACTITIONER

## 2025-04-12 PROCEDURE — 94660 CPAP INITIATION&MGMT: CPT

## 2025-04-12 PROCEDURE — 86140 C-REACTIVE PROTEIN: CPT | Performed by: NURSE PRACTITIONER

## 2025-04-12 PROCEDURE — 85025 COMPLETE CBC W/AUTO DIFF WBC: CPT | Performed by: NURSE PRACTITIONER

## 2025-04-12 PROCEDURE — 63710000001 DEXAMETHASONE PER 0.25 MG: Performed by: INTERNAL MEDICINE

## 2025-04-12 PROCEDURE — 36415 COLL VENOUS BLD VENIPUNCTURE: CPT | Performed by: NURSE PRACTITIONER

## 2025-04-12 PROCEDURE — 63710000001 DEXAMETHASONE PER 0.25 MG: Performed by: NURSE PRACTITIONER

## 2025-04-12 PROCEDURE — 25810000003 SODIUM CHLORIDE 0.9 % SOLUTION 250 ML FLEX CONT: Performed by: NURSE PRACTITIONER

## 2025-04-12 PROCEDURE — 25010000002 DEXAMETHASONE PER 1 MG: Performed by: INTERNAL MEDICINE

## 2025-04-12 PROCEDURE — 25010000002 REMDESIVIR 100 MG/20ML SOLUTION 1 EACH VIAL: Performed by: NURSE PRACTITIONER

## 2025-04-12 PROCEDURE — 94760 N-INVAS EAR/PLS OXIMETRY 1: CPT

## 2025-04-12 RX ORDER — DEXAMETHASONE SODIUM PHOSPHATE 10 MG/ML
6 INJECTION, SOLUTION INTRA-ARTICULAR; INTRALESIONAL; INTRAMUSCULAR; INTRAVENOUS; SOFT TISSUE EVERY 6 HOURS
Status: DISCONTINUED | OUTPATIENT
Start: 2025-04-12 | End: 2025-04-16 | Stop reason: HOSPADM

## 2025-04-12 RX ADMIN — GUAIFENESIN 600 MG: 600 TABLET, EXTENDED RELEASE ORAL at 09:09

## 2025-04-12 RX ADMIN — IPRATROPIUM BROMIDE AND ALBUTEROL SULFATE 3 ML: .5; 3 SOLUTION RESPIRATORY (INHALATION) at 00:23

## 2025-04-12 RX ADMIN — IPRATROPIUM BROMIDE AND ALBUTEROL SULFATE 3 ML: .5; 3 SOLUTION RESPIRATORY (INHALATION) at 20:47

## 2025-04-12 RX ADMIN — GUAIFENESIN 600 MG: 600 TABLET, EXTENDED RELEASE ORAL at 22:40

## 2025-04-12 RX ADMIN — FAMOTIDINE 20 MG: 20 TABLET, FILM COATED ORAL at 22:40

## 2025-04-12 RX ADMIN — APIXABAN 5 MG: 5 TABLET, FILM COATED ORAL at 22:40

## 2025-04-12 RX ADMIN — ATORVASTATIN CALCIUM 80 MG: 80 TABLET, FILM COATED ORAL at 09:09

## 2025-04-12 RX ADMIN — Medication 1000 MCG: at 09:09

## 2025-04-12 RX ADMIN — DEXAMETHASONE 6 MG: 2 TABLET ORAL at 09:09

## 2025-04-12 RX ADMIN — DEXAMETHASONE 6 MG: 2 TABLET ORAL at 15:33

## 2025-04-12 RX ADMIN — FAMOTIDINE 20 MG: 20 TABLET, FILM COATED ORAL at 09:09

## 2025-04-12 RX ADMIN — BUDESONIDE AND FORMOTEROL FUMARATE DIHYDRATE 2 PUFF: 160; 4.5 AEROSOL RESPIRATORY (INHALATION) at 11:09

## 2025-04-12 RX ADMIN — APIXABAN 5 MG: 5 TABLET, FILM COATED ORAL at 09:09

## 2025-04-12 RX ADMIN — IPRATROPIUM BROMIDE AND ALBUTEROL SULFATE 3 ML: .5; 3 SOLUTION RESPIRATORY (INHALATION) at 03:50

## 2025-04-12 RX ADMIN — IPRATROPIUM BROMIDE AND ALBUTEROL SULFATE 3 ML: .5; 3 SOLUTION RESPIRATORY (INHALATION) at 11:04

## 2025-04-12 RX ADMIN — IPRATROPIUM BROMIDE AND ALBUTEROL SULFATE 3 ML: .5; 3 SOLUTION RESPIRATORY (INHALATION) at 07:16

## 2025-04-12 RX ADMIN — METOPROLOL TARTRATE 25 MG: 25 TABLET, FILM COATED ORAL at 22:40

## 2025-04-12 RX ADMIN — AZITHROMYCIN 250 MG: 250 TABLET, FILM COATED ORAL at 09:09

## 2025-04-12 RX ADMIN — ASPIRIN 81 MG: 81 TABLET, COATED ORAL at 09:09

## 2025-04-12 RX ADMIN — IPRATROPIUM BROMIDE AND ALBUTEROL SULFATE 3 ML: .5; 3 SOLUTION RESPIRATORY (INHALATION) at 23:55

## 2025-04-12 RX ADMIN — IPRATROPIUM BROMIDE AND ALBUTEROL SULFATE 3 ML: .5; 3 SOLUTION RESPIRATORY (INHALATION) at 14:59

## 2025-04-12 RX ADMIN — DEXAMETHASONE SODIUM PHOSPHATE 6 MG: 10 INJECTION INTRAMUSCULAR; INTRAVENOUS at 22:39

## 2025-04-12 RX ADMIN — METOPROLOL TARTRATE 25 MG: 25 TABLET, FILM COATED ORAL at 09:09

## 2025-04-12 RX ADMIN — SODIUM CHLORIDE 100 MG: 9 INJECTION, SOLUTION INTRAVENOUS at 22:40

## 2025-04-12 NOTE — PROGRESS NOTES
Name: Wisam Malone ADMIT: 4/10/2025   : 1945  PCP: Billie Ponce MD    MRN: [id number] LOS: 2 days   AGE/SEX: 79 y.o. male  ROOM: [facility]     Subjective   Subjective   Patient is seen at bedside, no new complaints.       Objective   Objective   Vital Signs  Temp:  [97.5 °F (36.4 °C)-97.9 °F (36.6 °C)] 97.5 °F (36.4 °C)  Heart Rate:  [74-90] 90  Resp:  [20-26] 26  BP: (102-121)/(53-71) 121/71  SpO2:  [96 %-100 %] 100 %  on  Flow (L/min) (Oxygen Therapy):  [20] 20;   Device (Oxygen Therapy): NPPV/NIV  Body mass index is 18.91 kg/m².  Physical Exam  General, awake and alert.  Head and ENT, normocephalic and atraumatic.  Lungs, symmetric expansion, equal air entry bilaterally.  Heart, regular rate and rhythm.  Abdomen, soft and nontender.  Extremities, no clubbing or cyanosis.  Neuro, no focal deficits.  Skin: Warm and no rash.  Psych, normal mood and affect.  Musculoskeletal, joint examination is grossly normal.      Results Review     I reviewed the patient's new clinical results.  Results from last 7 days   Lab Units 04/12/25  0437 04/11/25  0528 04/10/25  1558   WBC 10*3/mm3 7.23 4.45 12.91*   HEMOGLOBIN g/dL 12.2* 12.1* 13.7   PLATELETS 10*3/mm3 176 178 257     Results from last 7 days   Lab Units 04/12/25  0437 04/11/25  0528 04/10/25  1558   SODIUM mmol/L 142 141 139   POTASSIUM mmol/L 4.2 5.2 4.3   CHLORIDE mmol/L 105 104 100   CO2 mmol/L 29.9* 29.6* 30.8*   BUN mg/dL 30* 25* 20   CREATININE mg/dL 1.06 1.05 1.06   GLUCOSE mg/dL 78 173* 131*   EGFR mL/min/1.73 70.9 71.8 70.9     Results from last 7 days   Lab Units 04/12/25  0437 04/11/25  0528 04/10/25  1558   ALBUMIN g/dL 3.1* 3.2* 3.9   BILIRUBIN mg/dL <0.2 <0.2 0.5   ALK PHOS U/L 137* 118* 157*   AST (SGOT) U/L 20 17 25   ALT (SGPT) U/L 25 24 29     Results from last 7 days   Lab Units 25  0437 25  0528 04/10/25  1558   CALCIUM mg/dL 8.4* 8.7 9.0   ALBUMIN g/dL 3.1* 3.2* 3.9   MAGNESIUM mg/dL  --  2.1 2.3   PHOSPHORUS mg/dL  --   "3.1 4.0     Results from last 7 days   Lab Units 04/10/25  1711 04/10/25  1558   PROCALCITONIN ng/mL 0.06 0.07   LACTATE mmol/L  --  1.4     No results found for: \"HGBA1C\", \"POCGLU\"    XR Chest 1 View  Result Date: 4/10/2025  No focal pulmonary consolidation. Follow-up as clinical indications persist.  This report was finalized on 4/10/2025 4:50 PM by Dr. Jarred Albarado M.D on Workstation: [email or web address]        I have personally reviewed all medications:  Scheduled Medications  apixaban, 5 mg, Oral, Q12H  aspirin, 81 mg, Oral, Daily  atorvastatin, 80 mg, Oral, Daily  azithromycin, 250 mg, Oral, Q24H  budesonide-formoterol, 2 puff, Inhalation, BID - RT  dexAMETHasone, 6 mg, Oral, Q6H   Or  dexAMETHasone, 6 mg, Intravenous, Q6H  famotidine, 20 mg, Oral, BID  guaiFENesin, 600 mg, Oral, Q12H  ipratropium-albuterol, 3 mL, Nebulization, Q4H - RT  metoprolol tartrate, 25 mg, Oral, BID  polyethylene glycol, 17 g, Oral, Daily  remdesivir, 100 mg, Intravenous, Q24H  vitamin B-12, 1,000 mcg, Oral, Daily    Infusions  Pharmacy Consult - Remdesivir,     Diet  Diet: Cardiac; Healthy Heart (2-3 Na+); Fluid Consistency: Thin (IDDSI 0)    I have personally reviewed:  [x]  Laboratory   [x]  Microbiology   [x]  Radiology   [x]  EKG/Telemetry  [x]  Cardiology/Vascular   []  Pathology    []  Records       Assessment/Plan     Active Hospital Problems    Diagnosis  POA    **COVID-19 virus detected [U07.1]  Yes    Acute on chronic respiratory failure with hypoxia and hypercapnia [J96.21, J96.22]  Yes    Hypertension [I10]  Yes    Ischemic cardiomyopathy [I25.5]  Yes    Paroxysmal atrial fibrillation [I48.0]  Yes    COPD exacerbation [J44.1]  Yes      Resolved Hospital Problems   No resolved problems to display.       79 y.o. male admitted with COVID-19 virus detected.    Mr. Malone is a 79 y.o. former smoker with a history of chronic respiratory failure with hypoxia and hypercapnia, COPD, HTN, ischemic cardiomyopathy, PAF on Eliquis that " presents to [facility] complaining of worsening shortness of breath      COVID-19 infection  Acute on chronic hypercapnic respiratory failure  Acute hypoxic respiratory failure  - Respiratory panel on admission was positive for COVID-19.  ABG showed elevated pCO2 at 67.0.  pH of 7.305.  Was initiated on BiPAP, repeat pH improved up to 7.379, pCO2 decreased down to 51.1.  - X-ray on admission with no focal pulmonary consolidation. Follow-up as clinical indications persist.  - WBC mildly elevated at 12.91.  Procalcitonin normal.  Afebrile.  Patient received IV ceftriaxone in the ED, however low suspicion for superimposed bacterial infection currently.  Will monitor closely.  - Initiated on remdesivir, dexamethasone.  -O2 supplement to maintain saturation 88 to 92%  In the setting of holding hypercapnia  -Nebulizers,   -Mucinex, incentive spirometer  - V 60 BiPAP  - Pulmonology consult        PAF  Coronary artery disease  Ischemic cardiomyopathy  -Resume metoprolol titrate 25 mg twice daily.  Eliquis 5 mg twice daily.  - Aspirin, statin  Expected Discharge Date: 4/14/2025; Expected Discharge Time:       Alexander Castle MD  [facility]  04/12/25  14:29 EDT

## 2025-04-12 NOTE — PROGRESS NOTES
[facility]  [phone]  Dr. Tolu Aldana    Subjective:  LOS: 2    Chief Complaint: Respiratory failure    When I walked in patient is holding his BiPAP mask to his face.  He is intermittently taking it off to take his meals.  States that he has never had a problem with oxygenation.  Has a BiPAP at home that he uses consistently.    Objective   Vital Signs past 24hrs  Temp range: Temp (24hrs), Av.7 °F (36.5 °C), Min:97.5 °F (36.4 °C), Max:97.9 °F (36.6 °C)    BP range: BP: (102-121)/(53-71) 121/71  Pulse range: Heart Rate:  [74-90] 90  Resp rate range: Resp:  [20-26] 26  Device (Oxygen Therapy): NPPV/NIVFlow (L/min) (Oxygen Therapy):  [20] 20  Oxygen range:SpO2:  [96 %-100 %] 100 %   Mechanical Ventilator:     Physical Exam  Eyes:      Pupils: Pupils are equal, round, and reactive to light.   Cardiovascular:      Rate and Rhythm: Normal rate and regular rhythm.      Heart sounds: No murmur heard.  Pulmonary:      Effort: Accessory muscle usage and respiratory distress present.      Breath sounds: Decreased breath sounds and wheezing present.   Abdominal:      General: Bowel sounds are normal.      Palpations: Abdomen is soft. There is no mass.      Tenderness: There is no abdominal tenderness.   Musculoskeletal:         General: No swelling.   Neurological:      Mental Status: He is alert.       Results Review:    I have reviewed the laboratory and imaging data since the last note by [facility] physician.  My annotations are noted in assessment and plan.      Result Review:  I have personally reviewed the results from last note by [facility] physician to 2025 11:55 EDT and agree with these findings:  [x]  Laboratory list / accordion  [x]  Microbiology  [x]  Radiology  []  EKG/Telemetry   []  Cardiology/Vascular   []  Pathology  []  Old records  []  Other:      Medication Review:  I have reviewed the current MAR.  My annotations are noted in assessment and plan.    apixaban, 5 mg, Oral, Q12H  aspirin,  81 mg, Oral, Daily  atorvastatin, 80 mg, Oral, Daily  azithromycin, 250 mg, Oral, Q24H  budesonide-formoterol, 2 puff, Inhalation, BID - RT  dexAMETHasone, 6 mg, Oral, Daily   Or  dexAMETHasone, 6 mg, Intravenous, Daily  famotidine, 20 mg, Oral, BID  guaiFENesin, 600 mg, Oral, Q12H  ipratropium-albuterol, 3 mL, Nebulization, Q4H - RT  metoprolol tartrate, 25 mg, Oral, BID  polyethylene glycol, 17 g, Oral, Daily  remdesivir, 100 mg, Intravenous, Q24H  vitamin B-12, 1,000 mcg, Oral, Daily        Pharmacy Consult - Remdesivir,       Lines, Drains & Airways       Active LDAs       Name Placement date Placement time Site Days    Peripheral IV 04/11/25 1135 Anterior;Right Forearm 04/11/25  1135  Forearm  1                    PCC Problems  Acute on chronic hypercapnic respiratory failure  Acute hypoxic respiratory failure  Home BIPAP  COVID-19 infection  COPD with acute exacerbation  Paroxysmal atrial fibrillation  Coronary artery disease  Ischemic cardiomyopathy  Hyperlipidemia  Hypertension  [facility]        THESE ARE NEW MEDICAL PROBLEMS TO ME.    Plan of Treatment    Currently using noninvasive ventilation and with benefit.  Apparently not able to tolerate room air on nasal cannula without NIV at this time.  Continue as required.    Patient was asked to bring in his home BiPAP machine to see if it is set appropriately for his chronic respiratory failure.    Currently with active wheezing.  COPD exacerbation likely precipitated by COVID-19.  I will change his dexamethasone to every 6 hours for now.  Can taper to lower dose as his wheezing improves.  He is already on DuoNebs every 4 hours.    Currently on treatment for COVID-19 with remdesivir.  Also on dexamethasone as outlined.    No pulmonary infiltrates.  No need for antibiotics at this time.  Patient is on azithromycin for COPD management.    [facility]        Tolu Aldana MD  04/12/25  11:55  EDT    Isolation status: Enhanced Droplet/Contact     Dietary Orders (From admission, onward)       Start     Ordered    04/11/25 0937  Dietary Nutrition Supplements Other (see comment); Boost Original  Daily With Breakfast & Dinner      Question Answer Comment   Select Supplement: Other (see comment)    Other Boost Original        04/11/25 0936    04/11/25 0100  Diet: Cardiac; Healthy Heart (2-3 Na+); Fluid Consistency: Thin (IDDSI 0)  Diet Effective Now        References:    Diet Order Definitions   Question Answer Comment   Diets: Cardiac    Cardiac Diet: Healthy Heart (2-3 Na+)    Fluid Consistency: Thin (IDDSI 0)        04/11/25 0059                    Part of this note may be an electronic transcription/translation of spoken language to printed text using the Dragon Dictation System.

## 2025-04-12 NOTE — PLAN OF CARE
Problem: Adult Inpatient Plan of Care  Goal: Plan of Care Review  Flowsheets (Taken 4/12/2025 0431)  Progress: no change  Outcome Evaluation: No changes. Bipap in use. VSS. Makes needs known. Remdesivir per orders. Up w/ assist x1. Call light within reach.  Plan of Care Reviewed With: patient  Goal: Absence of Hospital-Acquired Illness or Injury  Intervention: Identify and Manage Fall Risk  Flowsheets  Taken 4/12/2025 0429  Safety Promotion/Fall Prevention:   activity supervised   assistive device/personal items within reach   clutter free environment maintained   fall prevention program maintained   lighting adjusted   nonskid shoes/slippers when out of bed   room organization consistent   safety round/check completed  Taken 4/12/2025 0221  Safety Promotion/Fall Prevention:   activity supervised   assistive device/personal items within reach   clutter free environment maintained   fall prevention program maintained   lighting adjusted   nonskid shoes/slippers when out of bed   room organization consistent   safety round/check completed  Taken 4/12/2025 0027  Safety Promotion/Fall Prevention:   activity supervised   assistive device/personal items within reach   clutter free environment maintained   fall prevention program maintained   lighting adjusted   nonskid shoes/slippers when out of bed   room organization consistent   safety round/check completed  Taken 4/11/2025 2250  Safety Promotion/Fall Prevention:   activity supervised   assistive device/personal items within reach   clutter free environment maintained   fall prevention program maintained   lighting adjusted   nonskid shoes/slippers when out of bed   room organization consistent   safety round/check completed  Intervention: Prevent Skin Injury  Flowsheets  Taken 4/12/2025 0429  Body Position: position changed independently  Taken 4/12/2025 0221  Body Position: position changed independently  Taken 4/12/2025 0027  Body Position: position changed  independently  Taken 4/11/2025 2250  Body Position: position changed independently  Taken 4/11/2025 2024  Skin Protection:   incontinence pads utilized   transparent dressing maintained  Intervention: Prevent and Manage VTE (Venous Thromboembolism) Risk  Flowsheets (Taken 4/10/2025 2350 by Dave Turner, RN)  VTE Prevention/Management: (Eliquis) other (see comments)  Intervention: Prevent Infection  Flowsheets (Taken 4/12/2025 0318 by Aminah Sumner, PCT)  Infection Prevention:   environmental surveillance performed   hand hygiene promoted   rest/sleep promoted   single patient room provided  Goal: Optimal Comfort and Wellbeing  Intervention: Provide Person-Centered Care  Flowsheets (Taken 4/11/2025 2024)  Trust Relationship/Rapport:   care explained   choices provided   emotional support provided   empathic listening provided   questions answered   questions encouraged   thoughts/feelings acknowledged   reassurance provided  Goal: Readiness for Transition of Care  Intervention: Mutually Develop Transition Plan  Flowsheets (Taken 4/11/2025 1455 by Brinda Fernandes, RN)  Equipment Needed After Discharge: none  Equipment Currently Used at Home:   walker, spencer   bp cuff   bipap   oxygen   rollator   wheelchair   shower chair   grab bar  Transportation Anticipated: other (see comments)  Transportation Concerns: none  Concerns to be Addressed: adjustment to diagnosis/illness  Readmission Within the Last 30 Days: no previous admission in last 30 days  Patient/Family Anticipated Services at Transition: hospice care  Patient/Family Anticipates Transition to:   home with family   home with help/services     Problem: Comorbidity Management  Goal: Maintenance of COPD Symptom Control  Intervention: Maintain COPD (Chronic Obstructive Pulmonary Disease) Symptom Control  Flowsheets (Taken 4/11/2025 0802 by Refugio Rogers, RN)  Medication Review/Management: medications reviewed  Goal: Blood Pressure in Desired  Range  Intervention: Maintain Blood Pressure Management  Flowsheets (Taken 4/11/2025 0802 by Refugio Rogers, RN)  Medication Review/Management: medications reviewed     Problem: Noninvasive Ventilation Acute  Goal: Effective Unassisted Ventilation and Oxygenation  Intervention: Monitor and Manage Noninvasive Ventilation  Flowsheets (Taken 4/11/2025 0943 by Refugio Rogers, RN)  Airway/Ventilation Management: airway patency maintained     Problem: Fall Injury Risk  Goal: Absence of Fall and Fall-Related Injury  Intervention: Identify and Manage Contributors  Flowsheets (Taken 4/11/2025 0802 by Refugio Rogers, RN)  Medication Review/Management: medications reviewed  Intervention: Promote Injury-Free Environment  Flowsheets  Taken 4/12/2025 0429  Safety Promotion/Fall Prevention:   activity supervised   assistive device/personal items within reach   clutter free environment maintained   fall prevention program maintained   lighting adjusted   nonskid shoes/slippers when out of bed   room organization consistent   safety round/check completed  Taken 4/12/2025 0221  Safety Promotion/Fall Prevention:   activity supervised   assistive device/personal items within reach   clutter free environment maintained   fall prevention program maintained   lighting adjusted   nonskid shoes/slippers when out of bed   room organization consistent   safety round/check completed  Taken 4/12/2025 0027  Safety Promotion/Fall Prevention:   activity supervised   assistive device/personal items within reach   clutter free environment maintained   fall prevention program maintained   lighting adjusted   nonskid shoes/slippers when out of bed   room organization consistent   safety round/check completed  Taken 4/11/2025 2250  Safety Promotion/Fall Prevention:   activity supervised   assistive device/personal items within reach   clutter free environment maintained   fall prevention program maintained   lighting adjusted    nonskid shoes/slippers when out of bed   room organization consistent   safety round/check completed   Goal Outcome Evaluation:  Plan of Care Reviewed With: patient        Progress: no change  Outcome Evaluation: No changes. Bipap in use. VSS. Makes needs known. Remdesivir per orders. Up w/ assist x1. Call light within reach.

## 2025-04-13 LAB
ALBUMIN SERPL-MCNC: 2.9 G/DL (ref 3.5–5.2)
ALBUMIN/GLOB SERPL: 1.3 G/DL
ALP SERPL-CCNC: 155 U/L (ref 39–117)
ALT SERPL W P-5'-P-CCNC: 25 U/L (ref 1–41)
ANION GAP SERPL CALCULATED.3IONS-SCNC: 8 MMOL/L (ref 5–15)
AST SERPL-CCNC: 18 U/L (ref 1–40)
BASOPHILS # BLD AUTO: 0 10*3/MM3 (ref 0–0.2)
BASOPHILS NFR BLD AUTO: 0 % (ref 0–1.5)
BILIRUB SERPL-MCNC: <0.2 MG/DL (ref 0–1.2)
BUN SERPL-MCNC: 29 MG/DL (ref 8–23)
BUN/CREAT SERPL: 33.7 (ref 7–25)
CALCIUM SPEC-SCNC: 8 MG/DL (ref 8.6–10.5)
CHLORIDE SERPL-SCNC: 102 MMOL/L (ref 98–107)
CO2 SERPL-SCNC: 26 MMOL/L (ref 22–29)
CREAT SERPL-MCNC: 0.86 MG/DL (ref 0.76–1.27)
CRP SERPL-MCNC: 1.18 MG/DL (ref 0–0.5)
D DIMER PPP FEU-MCNC: 1.1 MCGFEU/ML (ref 0–0.8)
DEPRECATED RDW RBC AUTO: 39.6 FL (ref 37–54)
EGFRCR SERPLBLD CKD-EPI 2021: 87.5 ML/MIN/1.73
EOSINOPHIL # BLD AUTO: 0 10*3/MM3 (ref 0–0.4)
EOSINOPHIL NFR BLD AUTO: 0 % (ref 0.3–6.2)
ERYTHROCYTE [DISTWIDTH] IN BLOOD BY AUTOMATED COUNT: 12.1 % (ref 12.3–15.4)
FERRITIN SERPL-MCNC: 42 NG/ML (ref 30–400)
FOLATE SERPL-MCNC: 9.16 NG/ML (ref 4.78–24.2)
GLOBULIN UR ELPH-MCNC: 2.3 GM/DL
GLUCOSE SERPL-MCNC: 155 MG/DL (ref 65–99)
HAPTOGLOB SERPL-MCNC: 104 MG/DL (ref 30–200)
HCT VFR BLD AUTO: 36.7 % (ref 37.5–51)
HGB BLD-MCNC: 11.7 G/DL (ref 13–17.7)
IMM GRANULOCYTES # BLD AUTO: 0.04 10*3/MM3 (ref 0–0.05)
IMM GRANULOCYTES NFR BLD AUTO: 0.8 % (ref 0–0.5)
IRON 24H UR-MRATE: 28 MCG/DL (ref 59–158)
IRON SATN MFR SERPL: 8 % (ref 20–50)
LYMPHOCYTES # BLD AUTO: 0.38 10*3/MM3 (ref 0.7–3.1)
LYMPHOCYTES NFR BLD AUTO: 7.6 % (ref 19.6–45.3)
MCH RBC QN AUTO: 28.7 PG (ref 26.6–33)
MCHC RBC AUTO-ENTMCNC: 31.9 G/DL (ref 31.5–35.7)
MCV RBC AUTO: 90.2 FL (ref 79–97)
MONOCYTES # BLD AUTO: 0.04 10*3/MM3 (ref 0.1–0.9)
MONOCYTES NFR BLD AUTO: 0.8 % (ref 5–12)
NEUTROPHILS NFR BLD AUTO: 4.57 10*3/MM3 (ref 1.7–7)
NEUTROPHILS NFR BLD AUTO: 90.8 % (ref 42.7–76)
NRBC BLD AUTO-RTO: 0 /100 WBC (ref 0–0.2)
PLATELET # BLD AUTO: 184 10*3/MM3 (ref 140–450)
PMV BLD AUTO: 9.5 FL (ref 6–12)
POTASSIUM SERPL-SCNC: 4.5 MMOL/L (ref 3.5–5.2)
PROCALCITONIN SERPL-MCNC: 0.05 NG/ML (ref 0–0.25)
PROT SERPL-MCNC: 5.2 G/DL (ref 6–8.5)
RBC # BLD AUTO: 4.07 10*6/MM3 (ref 4.14–5.8)
RETICS # AUTO: 0.04 10*6/MM3 (ref 0.02–0.13)
RETICS/RBC NFR AUTO: 1 % (ref 0.7–1.9)
SODIUM SERPL-SCNC: 136 MMOL/L (ref 136–145)
TIBC SERPL-MCNC: 335 MCG/DL (ref 298–536)
TRANSFERRIN SERPL-MCNC: 225 MG/DL (ref 200–360)
VIT B12 BLD-MCNC: >2000 PG/ML (ref 211–946)
WBC NRBC COR # BLD AUTO: 5.03 10*3/MM3 (ref 3.4–10.8)

## 2025-04-13 PROCEDURE — 85379 FIBRIN DEGRADATION QUANT: CPT | Performed by: NURSE PRACTITIONER

## 2025-04-13 PROCEDURE — 94761 N-INVAS EAR/PLS OXIMETRY MLT: CPT

## 2025-04-13 PROCEDURE — 94760 N-INVAS EAR/PLS OXIMETRY 1: CPT

## 2025-04-13 PROCEDURE — 85045 AUTOMATED RETICULOCYTE COUNT: CPT | Performed by: INTERNAL MEDICINE

## 2025-04-13 PROCEDURE — 80053 COMPREHEN METABOLIC PANEL: CPT | Performed by: NURSE PRACTITIONER

## 2025-04-13 PROCEDURE — 94799 UNLISTED PULMONARY SVC/PX: CPT

## 2025-04-13 PROCEDURE — 36415 COLL VENOUS BLD VENIPUNCTURE: CPT | Performed by: NURSE PRACTITIONER

## 2025-04-13 PROCEDURE — 83010 ASSAY OF HAPTOGLOBIN QUANT: CPT | Performed by: INTERNAL MEDICINE

## 2025-04-13 PROCEDURE — 25810000003 SODIUM CHLORIDE 0.9 % SOLUTION 250 ML FLEX CONT: Performed by: NURSE PRACTITIONER

## 2025-04-13 PROCEDURE — 83540 ASSAY OF IRON: CPT | Performed by: INTERNAL MEDICINE

## 2025-04-13 PROCEDURE — 86140 C-REACTIVE PROTEIN: CPT | Performed by: NURSE PRACTITIONER

## 2025-04-13 PROCEDURE — 82746 ASSAY OF FOLIC ACID SERUM: CPT | Performed by: INTERNAL MEDICINE

## 2025-04-13 PROCEDURE — 82728 ASSAY OF FERRITIN: CPT | Performed by: INTERNAL MEDICINE

## 2025-04-13 PROCEDURE — 85025 COMPLETE CBC W/AUTO DIFF WBC: CPT | Performed by: NURSE PRACTITIONER

## 2025-04-13 PROCEDURE — 25010000002 REMDESIVIR 100 MG/20ML SOLUTION 1 EACH VIAL: Performed by: NURSE PRACTITIONER

## 2025-04-13 PROCEDURE — 84466 ASSAY OF TRANSFERRIN: CPT | Performed by: INTERNAL MEDICINE

## 2025-04-13 PROCEDURE — 25010000002 DEXAMETHASONE PER 1 MG: Performed by: INTERNAL MEDICINE

## 2025-04-13 PROCEDURE — 82607 VITAMIN B-12: CPT | Performed by: INTERNAL MEDICINE

## 2025-04-13 PROCEDURE — 84145 PROCALCITONIN (PCT): CPT | Performed by: NURSE PRACTITIONER

## 2025-04-13 PROCEDURE — 63710000001 DEXAMETHASONE PER 0.25 MG: Performed by: INTERNAL MEDICINE

## 2025-04-13 RX ADMIN — APIXABAN 5 MG: 5 TABLET, FILM COATED ORAL at 08:47

## 2025-04-13 RX ADMIN — IPRATROPIUM BROMIDE AND ALBUTEROL SULFATE 3 ML: .5; 3 SOLUTION RESPIRATORY (INHALATION) at 23:16

## 2025-04-13 RX ADMIN — IPRATROPIUM BROMIDE AND ALBUTEROL SULFATE 3 ML: .5; 3 SOLUTION RESPIRATORY (INHALATION) at 07:19

## 2025-04-13 RX ADMIN — FAMOTIDINE 20 MG: 20 TABLET, FILM COATED ORAL at 08:47

## 2025-04-13 RX ADMIN — SODIUM CHLORIDE 100 MG: 9 INJECTION, SOLUTION INTRAVENOUS at 21:37

## 2025-04-13 RX ADMIN — ASPIRIN 81 MG: 81 TABLET, COATED ORAL at 08:48

## 2025-04-13 RX ADMIN — GUAIFENESIN 600 MG: 600 TABLET, EXTENDED RELEASE ORAL at 21:37

## 2025-04-13 RX ADMIN — IPRATROPIUM BROMIDE AND ALBUTEROL SULFATE 3 ML: .5; 3 SOLUTION RESPIRATORY (INHALATION) at 14:36

## 2025-04-13 RX ADMIN — AZITHROMYCIN 250 MG: 250 TABLET, FILM COATED ORAL at 08:47

## 2025-04-13 RX ADMIN — Medication 1000 MCG: at 08:48

## 2025-04-13 RX ADMIN — IPRATROPIUM BROMIDE AND ALBUTEROL SULFATE 3 ML: .5; 3 SOLUTION RESPIRATORY (INHALATION) at 18:56

## 2025-04-13 RX ADMIN — FAMOTIDINE 20 MG: 20 TABLET, FILM COATED ORAL at 21:37

## 2025-04-13 RX ADMIN — BUDESONIDE AND FORMOTEROL FUMARATE DIHYDRATE 2 PUFF: 160; 4.5 AEROSOL RESPIRATORY (INHALATION) at 18:56

## 2025-04-13 RX ADMIN — METOPROLOL TARTRATE 25 MG: 25 TABLET, FILM COATED ORAL at 08:47

## 2025-04-13 RX ADMIN — IPRATROPIUM BROMIDE AND ALBUTEROL SULFATE 3 ML: .5; 3 SOLUTION RESPIRATORY (INHALATION) at 03:36

## 2025-04-13 RX ADMIN — GUAIFENESIN 600 MG: 600 TABLET, EXTENDED RELEASE ORAL at 08:47

## 2025-04-13 RX ADMIN — DEXAMETHASONE 6 MG: 2 TABLET ORAL at 08:47

## 2025-04-13 RX ADMIN — METOPROLOL TARTRATE 25 MG: 25 TABLET, FILM COATED ORAL at 21:37

## 2025-04-13 RX ADMIN — DEXAMETHASONE 6 MG: 2 TABLET ORAL at 14:52

## 2025-04-13 RX ADMIN — DEXAMETHASONE SODIUM PHOSPHATE 6 MG: 10 INJECTION INTRAMUSCULAR; INTRAVENOUS at 21:37

## 2025-04-13 RX ADMIN — IPRATROPIUM BROMIDE AND ALBUTEROL SULFATE 3 ML: .5; 3 SOLUTION RESPIRATORY (INHALATION) at 10:47

## 2025-04-13 RX ADMIN — ATORVASTATIN CALCIUM 80 MG: 80 TABLET, FILM COATED ORAL at 08:48

## 2025-04-13 RX ADMIN — BUDESONIDE AND FORMOTEROL FUMARATE DIHYDRATE 2 PUFF: 160; 4.5 AEROSOL RESPIRATORY (INHALATION) at 07:24

## 2025-04-13 RX ADMIN — APIXABAN 5 MG: 5 TABLET, FILM COATED ORAL at 21:37

## 2025-04-13 NOTE — PLAN OF CARE
Problem: Adult Inpatient Plan of Care  Goal: Plan of Care Review  Flowsheets (Taken 4/13/2025 0512)  Progress: no change  Outcome Evaluation: No changes. Bopap in use. VSS. Makes needs known. Refusing turns @ times. Educated on importance. Verbalized understanding. Still refusing. Bed locked and in lowest position. Call light within reach.  Plan of Care Reviewed With: patient  Goal: Absence of Hospital-Acquired Illness or Injury  Intervention: Identify and Manage Fall Risk  Flowsheets  Taken 4/13/2025 0407  Safety Promotion/Fall Prevention:   activity supervised   assistive device/personal items within reach   clutter free environment maintained   fall prevention program maintained   lighting adjusted   nonskid shoes/slippers when out of bed   room organization consistent   safety round/check completed  Taken 4/13/2025 0236  Safety Promotion/Fall Prevention:   activity supervised   assistive device/personal items within reach   clutter free environment maintained   fall prevention program maintained   lighting adjusted   nonskid shoes/slippers when out of bed   room organization consistent   safety round/check completed  Taken 4/13/2025 0028  Safety Promotion/Fall Prevention:   activity supervised   assistive device/personal items within reach   clutter free environment maintained   fall prevention program maintained   lighting adjusted   nonskid shoes/slippers when out of bed   room organization consistent   safety round/check completed  Taken 4/12/2025 2240  Safety Promotion/Fall Prevention:   activity supervised   assistive device/personal items within reach   clutter free environment maintained   fall prevention program maintained   lighting adjusted   nonskid shoes/slippers when out of bed   room organization consistent   safety round/check completed  Taken 4/12/2025 2020  Safety Promotion/Fall Prevention:   activity supervised   assistive device/personal items within reach   clutter free environment  maintained   fall prevention program maintained   lighting adjusted   nonskid shoes/slippers when out of bed   room organization consistent   safety round/check completed  Intervention: Prevent Skin Injury  Flowsheets  Taken 4/13/2025 0407  Body Position:   supine   patient/family refused  Taken 4/13/2025 0236  Body Position:   supine   patient/family refused  Taken 4/13/2025 0028  Body Position: supine  Taken 4/12/2025 2240  Body Position:   tilted   right  Taken 4/12/2025 2020  Body Position: supine  Taken 4/11/2025 2024  Skin Protection:   incontinence pads utilized   transparent dressing maintained  Intervention: Prevent and Manage VTE (Venous Thromboembolism) Risk  Flowsheets (Taken 4/12/2025 0909 by Refugio Rogers, RN)  VTE Prevention/Management: (pt on eliquis) other (see comments)  Intervention: Prevent Infection  Flowsheets (Taken 4/13/2025 0309 by Aminata Vicente, PCT)  Infection Prevention:   environmental surveillance performed   hand hygiene promoted   rest/sleep promoted   single patient room provided  Goal: Optimal Comfort and Wellbeing  Intervention: Provide Person-Centered Care  Flowsheets (Taken 4/12/2025 2020)  Trust Relationship/Rapport:   care explained   choices provided   emotional support provided   empathic listening provided   questions answered   questions encouraged   reassurance provided   thoughts/feelings acknowledged  Goal: Readiness for Transition of Care  Intervention: Mutually Develop Transition Plan  Flowsheets (Taken 4/11/2025 1455 by Brinda Fernandes, RN)  Equipment Needed After Discharge: none  Equipment Currently Used at Home:   walker, spencer   bp cuff   bipap   oxygen   rollator   wheelchair   shower chair   grab bar  Transportation Anticipated: other (see comments)  Transportation Concerns: none  Concerns to be Addressed: adjustment to diagnosis/illness  Readmission Within the Last 30 Days: no previous admission in last 30 days  Patient/Family Anticipated Services at  Transition: hospice care  Patient/Family Anticipates Transition to:   home with family   home with help/services     Problem: Comorbidity Management  Goal: Maintenance of COPD Symptom Control  Intervention: Maintain COPD (Chronic Obstructive Pulmonary Disease) Symptom Control  Flowsheets (Taken 4/11/2025 0802 by Refugio Rogers, RN)  Medication Review/Management: medications reviewed  Goal: Blood Pressure in Desired Range  Intervention: Maintain Blood Pressure Management  Flowsheets (Taken 4/11/2025 0802 by Refugio Rogers, RN)  Medication Review/Management: medications reviewed     Problem: Noninvasive Ventilation Acute  Goal: Effective Unassisted Ventilation and Oxygenation  Intervention: Monitor and Manage Noninvasive Ventilation  Flowsheets (Taken 4/11/2025 0943 by Refugio Rogers, RN)  Airway/Ventilation Management: airway patency maintained     Problem: Fall Injury Risk  Goal: Absence of Fall and Fall-Related Injury  Intervention: Identify and Manage Contributors  Flowsheets (Taken 4/11/2025 0802 by Refugio Rogers, RN)  Medication Review/Management: medications reviewed  Intervention: Promote Injury-Free Environment  Flowsheets  Taken 4/13/2025 0407  Safety Promotion/Fall Prevention:   activity supervised   assistive device/personal items within reach   clutter free environment maintained   fall prevention program maintained   lighting adjusted   nonskid shoes/slippers when out of bed   room organization consistent   safety round/check completed  Taken 4/13/2025 0236  Safety Promotion/Fall Prevention:   activity supervised   assistive device/personal items within reach   clutter free environment maintained   fall prevention program maintained   lighting adjusted   nonskid shoes/slippers when out of bed   room organization consistent   safety round/check completed  Taken 4/13/2025 0028  Safety Promotion/Fall Prevention:   activity supervised   assistive device/personal items within  reach   clutter free environment maintained   fall prevention program maintained   lighting adjusted   nonskid shoes/slippers when out of bed   room organization consistent   safety round/check completed  Taken 4/12/2025 2240  Safety Promotion/Fall Prevention:   activity supervised   assistive device/personal items within reach   clutter free environment maintained   fall prevention program maintained   lighting adjusted   nonskid shoes/slippers when out of bed   room organization consistent   safety round/check completed  Taken 4/12/2025 2020  Safety Promotion/Fall Prevention:   activity supervised   assistive device/personal items within reach   clutter free environment maintained   fall prevention program maintained   lighting adjusted   nonskid shoes/slippers when out of bed   room organization consistent   safety round/check completed     Problem: Skin Injury Risk Increased  Goal: Skin Health and Integrity  Intervention: Optimize Skin Protection  Flowsheets  Taken 4/13/2025 0407  Activity Management: activity encouraged  Head of Bed (HOB) Positioning: HOB elevated  Taken 4/13/2025 0236  Activity Management: activity encouraged  Head of Bed (HOB) Positioning: HOB elevated  Taken 4/13/2025 0028  Activity Management: activity encouraged  Head of Bed (HOB) Positioning: HOB elevated  Taken 4/12/2025 2240  Activity Management: activity encouraged  Head of Bed (HOB) Positioning:   HOB elevated   HOB at 20-30 degrees  Taken 4/12/2025 2020  Activity Management: activity encouraged  Head of Bed (HOB) Positioning:   HOB elevated   HOB at 20-30 degrees  Taken 4/11/2025 2024  Pressure Reduction Techniques:   frequent weight shift encouraged   weight shift assistance provided  Pressure Reduction Devices: positioning supports utilized  Skin Protection:   incontinence pads utilized   transparent dressing maintained   Goal Outcome Evaluation:  Plan of Care Reviewed With: patient        Progress: no change  Outcome Evaluation:  No changes. Bopap in use. VSS. Makes needs known. Refusing turns @ times. Educated on importance. Verbalized understanding. Still refusing. Bed locked and in lowest position. Call light within reach.

## 2025-04-13 NOTE — PROGRESS NOTES
[facility]  [phone]  Dr. Tolu Aldana    Subjective:  LOS: 3    Chief Complaint: Respiratory failure    On BIPAP.    Objective   Vital Signs past 24hrs  Temp range: Temp (24hrs), Av °F (36.7 °C), Min:97.5 °F (36.4 °C), Max:98.4 °F (36.9 °C)    BP range: BP: (104-116)/(51-58) 116/51  Pulse range: Heart Rate:  [60-88] 74  Resp rate range: Resp:  [20-28] 20  Device (Oxygen Therapy): NPPV/NIVFlow (L/min) (Oxygen Therapy):  [20] 20  Oxygen range:SpO2:  [95 %-100 %] 95 %   Mechanical Ventilator:     Physical Exam  Eyes:      Pupils: Pupils are equal, round, and reactive to light.   Cardiovascular:      Rate and Rhythm: Normal rate and regular rhythm.      Heart sounds: No murmur heard.  Pulmonary:      Effort: Accessory muscle usage and respiratory distress present.      Breath sounds: Decreased breath sounds and wheezing present.   Abdominal:      General: Bowel sounds are normal.      Palpations: Abdomen is soft. There is no mass.      Tenderness: There is no abdominal tenderness.   Musculoskeletal:         General: No swelling.   Neurological:      Mental Status: He is alert.       Results Review:    I have reviewed the laboratory and imaging data since the last note by [facility] physician.  My annotations are noted in assessment and plan.      Result Review:  I have personally reviewed the results from last note by [facility] physician to 2025 12:25 EDT and agree with these findings:  [x]  Laboratory list / accordion  [x]  Microbiology  [x]  Radiology  []  EKG/Telemetry   []  Cardiology/Vascular   []  Pathology  []  Old records  []  Other:      Medication Review:  I have reviewed the current MAR.  My annotations are noted in assessment and plan.    apixaban, 5 mg, Oral, Q12H  aspirin, 81 mg, Oral, Daily  atorvastatin, 80 mg, Oral, Daily  azithromycin, 250 mg, Oral, Q24H  budesonide-formoterol, 2 puff, Inhalation, BID - RT  dexAMETHasone, 6 mg, Oral, Q6H   Or  dexAMETHasone, 6 mg, Intravenous,  Q6H  famotidine, 20 mg, Oral, BID  guaiFENesin, 600 mg, Oral, Q12H  ipratropium-albuterol, 3 mL, Nebulization, Q4H - RT  metoprolol tartrate, 25 mg, Oral, BID  polyethylene glycol, 17 g, Oral, Daily  remdesivir, 100 mg, Intravenous, Q24H  vitamin B-12, 1,000 mcg, Oral, Daily        Pharmacy Consult - Remdesivir,       Lines, Drains & Airways       Active LDAs       Name Placement date Placement time Site Days    Peripheral IV 04/11/25 1135 Anterior;Right Forearm 04/11/25  1135  Forearm  1                    PCCM Problems  Acute on chronic hypercapnic respiratory failure  Acute hypoxic respiratory failure  Home BIPAP  COVID-19 infection  COPD with acute exacerbation  Paroxysmal atrial fibrillation  Coronary artery disease  Ischemic cardiomyopathy  Hyperlipidemia  Hypertension  [facility]          Plan of Treatment    Currently using noninvasive ventilation and with benefit.  Apparently not able to tolerate room air on nasal cannula without NIV at this time.  Continue as required.    Patient was asked to bring in his home BiPAP machine to see if it is set appropriately for his chronic respiratory failure.    Currently with active wheezing.  COPD exacerbation likely precipitated by COVID-19.  Wheezing is still moderate but lesser intensity than yesterday. Keep same iv steroids and try to reduce tomorrow.    Currently on treatment for COVID-19 with remdesivir.  Also on dexamethasone as outlined.    No pulmonary infiltrates.  No need for antibiotics at this time.  Patient is on azithromycin for COPD management.    [facility]        Tolu Aldana MD  04/13/25  12:25 EDT    Isolation status: Enhanced Droplet/Contact     Dietary Orders (From admission, onward)       Start     Ordered    04/11/25 0901  Dietary Nutrition Supplements Other (see comment); Boost Original  Daily With Breakfast & Dinner      Question Answer Comment   Select Supplement: Other (see  comment)    Other Boost Original        04/11/25 0936    04/11/25 0100  Diet: Cardiac; Healthy Heart (2-3 Na+); Fluid Consistency: Thin (IDDSI 0)  Diet Effective Now        References:    Diet Order Definitions   Question Answer Comment   Diets: Cardiac    Cardiac Diet: Healthy Heart (2-3 Na+)    Fluid Consistency: Thin (IDDSI 0)        04/11/25 0059                    Part of this note may be an electronic transcription/translation of spoken language to printed text using the Dragon Dictation System.

## 2025-04-13 NOTE — PROGRESS NOTES
Name: Wisam Malone ADMIT: 4/10/2025   : 1945  PCP: Billie Ponce MD    MRN: [id number] LOS: 3 days   AGE/SEX: 79 y.o. male  ROOM: [facility]     Subjective   Subjective   Patient reports no significant improvement in the dyspnea and dry cough.  No chest pain.  No hemoptysis.  No palpitation.  No ankle edema.  No fever or chills    Review of Systems  GI.  No abdominal pain.  No nausea or vomiting.  .  No dysuria or hematuria.     Objective   Objective   Vital Signs  Temp:  [97.5 °F (36.4 °C)-98.4 °F (36.9 °C)] 98.2 °F (36.8 °C)  Heart Rate:  [60-88] 74  Resp:  [20-28] 20  BP: (104-116)/(51-58) 116/51  SpO2:  [95 %-100 %] 95 %  on   ;   Device (Oxygen Therapy): NPPV/NIV    Intake/Output Summary (Last 24 hours) at 2025 1408  Last data filed at 2025 1200  Gross per 24 hour   Intake 240 ml   Output 900 ml   Net -660 ml     Body mass index is 18.91 kg/m².      04/10/25  2315   Weight: 65 kg (143 lb 4.8 oz)     Physical Exam  General.  Elderly gentleman.  He is alert and oriented x 4.  In no apparent pain/distress/diaphoresis.  Normal mood and affect.  On BiPAP.  Eyes.  Pupils equal round and reactive.  Intact extraocular musculature.  No pallor or jaundice.  Oral cavity.  Moist mucous membrane  Neck.  Supple.  No JVD.  No lymphadenopathy or thyromegaly.  Cardiovascular.  Regular rate and rhythm with no gallops or murmurs  Chest.  Poor bilateral air entry with scattered bilateral expiratory wheezes.  No crackles.  Abdomen.  Soft lax.  No tenderness.  No organomegaly.  No guarding or rebound  Extremities.  No clubbing/cyanosis/edema  CNS.  No acute focal neurological deficits.    Results Review:      Results from last 7 days   Lab Units 25  0454 25  0437 25  0528 04/10/25  1558   SODIUM mmol/L 136 142 141 139   POTASSIUM mmol/L 4.5 4.2 5.2 4.3   CHLORIDE mmol/L 102 105 104 100   CO2 mmol/L 26.0 29.9* 29.6* 30.8*   BUN mg/dL 29* 30* 25* 20   CREATININE mg/dL 0.86 1.06 1.05 1.06  "  GLUCOSE mg/dL 155* 78 173* 131*   CALCIUM mg/dL 8.0* 8.4* 8.7 9.0   AST (SGOT) U/L 18 20 17 25   ALT (SGPT) U/L 25 25 24 29     Estimated Creatinine Clearance: 63 mL/min (by C-G formula based on SCr of 0.86 mg/dL).          Results from last 7 days   Lab Units 04/10/25  1711 04/10/25  1558   HSTROP T ng/L 32* 32*     Results from last 7 days   Lab Units 04/10/25  1558   PROBNP pg/mL 1,824.0*         Results from last 7 days   Lab Units 04/11/25  0528 04/10/25  1558   MAGNESIUM mg/dL 2.1 2.3   PHOSPHORUS mg/dL 3.1 4.0           Invalid input(s): \"LDLCALC\"  Results from last 7 days   Lab Units 04/13/25  0454 04/12/25  0437 04/11/25  0528 04/10/25  1558   WBC 10*3/mm3 5.03 7.23 4.45 12.91*   HEMOGLOBIN g/dL 11.7* 12.2* 12.1* 13.7   HEMATOCRIT % 36.7* 36.8* 37.1* 43.4   PLATELETS 10*3/mm3 184 176 178 257   MCV fL 90.2 88.7 90.5 90.6   MCH pg 28.7 29.4 29.5 28.6   MCHC g/dL 31.9 33.2 32.6 31.6   RDW % 12.1* 12.2* 12.0* 12.1*   RDW-SD fl 39.6 39.5 39.5 40.4   MPV fL 9.5 9.2 9.3 9.0   NEUTROPHIL % % 90.8* 72.2 86.1* 77.0*   LYMPHOCYTE % % 7.6* 16.7* 6.7* 12.2*   MONOCYTES % % 0.8* 9.4 6.3 9.2   EOSINOPHIL % % 0.0* 0.6 0.0* 0.6   BASOPHIL % % 0.0 0.4 0.0 0.5   IMM GRAN % % 0.8* 0.7* 0.9* 0.5   NEUTROS ABS 10*3/mm3 4.57 5.22 3.83 9.92*   LYMPHS ABS 10*3/mm3 0.38* 1.21 0.30* 1.58   MONOS ABS 10*3/mm3 0.04* 0.68 0.28 1.19*   EOS ABS 10*3/mm3 0.00 0.04 0.00 0.08   BASOS ABS 10*3/mm3 0.00 0.03 0.00 0.07   IMMATURE GRANS (ABS) 10*3/mm3 0.04 0.05 0.04 0.07*   NRBC /100 WBC 0.0 0.0 0.0 0.0     Results from last 7 days   Lab Units 04/10/25  1558   INR  1.35*     Results from last 7 days   Lab Units 04/10/25  1756 04/10/25  1600   PH, ARTERIAL pH units 7.379 7.305*   PO2 ART mm Hg 76.5* 248.8*   PCO2, ARTERIAL mm Hg 51.1* 67.0*   HCO3 ART mmol/L 30.1* 33.4*     Results from last 7 days   Lab Units 04/13/25  0454 04/10/25  1711 04/10/25  1558   PROCALCITONIN ng/mL 0.05 0.06 0.07   LACTATE mmol/L  --   --  1.4     Results from last 7 " days   Lab Units 04/13/25  0454 04/12/25  0437 04/11/25  0528   CRP mg/dL 1.18* 0.98* 1.72*         Results from last 7 days   Lab Units 04/10/25  1711 04/10/25  1645   BLOODCX  No growth at 2 days No growth at 2 days     Results from last 7 days   Lab Units 04/10/25  1559   ADENOVIRUS DETECTION BY PCR  Not Detected   CORONAVIRUS 229E  Not Detected   CORONAVIRUS HKU1  Not Detected   CORONAVIRUS NL63  Not Detected   CORONAVIRUS OC43  Not Detected   HUMAN METAPNEUMOVIRUS  Not Detected   HUMAN RHINOVIRUS/ENTEROVIRUS  Not Detected   INFLUENZA B PCR  Not Detected   PARAINFLUENZA 1  Not Detected   PARAINFLUENZA VIRUS 2  Not Detected   PARAINFLUENZA VIRUS 3  Not Detected   PARAINFLUENZA VIRUS 4  Not Detected   BORDETELLA PERTUSSIS PCR  Not Detected   CHLAMYDOPHILA PNEUMONIAE PCR  Not Detected   MYCOPLAMA PNEUMO PCR  Not Detected   INFLUENZA A PCR  Not Detected   RSV, PCR  Not Detected               Imaging:  Imaging Results (Last 24 Hours)       ** No results found for the last 24 hours. **               I reviewed the patient's new clinical results / labs / tests / procedures      Assessment/Plan     Active Hospital Problems    Diagnosis  POA    **COVID-19 virus detected [U07.1]  Yes    Acute on chronic respiratory failure with hypoxia and hypercapnia [J96.21, J96.22]  Yes    Hypertension [I10]  Yes    Ischemic cardiomyopathy [I25.5]  Yes    Paroxysmal atrial fibrillation [I48.0]  Yes    COPD exacerbation [J44.1]  Yes      Resolved Hospital Problems   No resolved problems to display.           Acute on chronic hypoxemic respiratory failure secondary to COPD exacerbation and COVID-19 respiratory infection.  Chest x-ray without infiltrate.  Respiratory PCR is positive for COVID-19.  Blood cultures are negative.  CRP is elevated.  Patient patient with slow improvement on remdesivir/p.o. Zithromax/Symbicort/Decadron/Mucinex/DuoNebs/NIPPV.  Noted and appreciate pulmonary consult.  Hyperglycemia.  Mostly steroid-induced.   Normal blood sugar yesterday.  Hypertension/coronary artery disease/ischemic cardiomyopathy and chronic systolic congestive heart failure/paroxysmal A-fib/intracardiac thrombus.  Patient last echo on 8/11/2023 revealing apical left ventricular thrombus, dilated left ventricle, anteroseptum akinesis consistent with previous infarction, ejection fraction of 36 to 40%, grade 1 diastolic dysfunction.   good blood pressure control.  No evidence of angina or congestive heart failure.  Continue Eliquis/Lipitor/aspirin/Lopressor  Anemia.  Hemoglobin worse than baseline of 12.  Will workup anemia.  VTE prophylaxis.  Patient anticoagulated.        Discussed my findings and plan of treatment with the patient.  Disposition.  To be determined based on clinical course.      Maryanne Dover MD  [facility]  04/13/25  14:08 EDT

## 2025-04-13 NOTE — PLAN OF CARE
Problem: Adult Inpatient Plan of Care  Goal: Absence of Hospital-Acquired Illness or Injury  Intervention: Identify and Manage Fall Risk  Recent Flowsheet Documentation  Taken 4/13/2025 1600 by Zelda Copeland RN  Safety Promotion/Fall Prevention:   safety round/check completed   room organization consistent   nonskid shoes/slippers when out of bed   muscle strengthening facilitated   lighting adjusted   fall prevention program maintained   clutter free environment maintained   assistive device/personal items within reach   activity supervised  Taken 4/13/2025 1400 by Zelda Copeland RN  Safety Promotion/Fall Prevention:   safety round/check completed   room organization consistent   nonskid shoes/slippers when out of bed   muscle strengthening facilitated   lighting adjusted   fall prevention program maintained   clutter free environment maintained   activity supervised   assistive device/personal items within reach  Taken 4/13/2025 1200 by Zelda Copeland RN  Safety Promotion/Fall Prevention:   safety round/check completed   room organization consistent   nonskid shoes/slippers when out of bed   muscle strengthening facilitated   lighting adjusted   fall prevention program maintained   clutter free environment maintained   assistive device/personal items within reach   activity supervised  Taken 4/13/2025 1000 by Zelda Copeland RN  Safety Promotion/Fall Prevention:   safety round/check completed   room organization consistent   nonskid shoes/slippers when out of bed   muscle strengthening facilitated   lighting adjusted   fall prevention program maintained   clutter free environment maintained   assistive device/personal items within reach   activity supervised  Taken 4/13/2025 0810 by Zelda Copeland RN  Safety Promotion/Fall Prevention:   safety round/check completed   room organization consistent   nonskid shoes/slippers when out of bed   muscle strengthening facilitated   lighting adjusted   fall  prevention program maintained   clutter free environment maintained   assistive device/personal items within reach   activity supervised  Intervention: Prevent Skin Injury  Recent Flowsheet Documentation  Taken 4/13/2025 1600 by Zelda Copeland RN  Body Position:   weight shifting   supine   position changed independently  Taken 4/13/2025 1400 by Zelda Copeland RN  Body Position:   position changed independently   sitting up in bed  Skin Protection:   incontinence pads utilized   transparent dressing maintained  Taken 4/13/2025 1200 by Zelda Copeland RN  Body Position:   weight shifting   tilted   right  Taken 4/13/2025 1000 by Zelda Copeland RN  Body Position:   position changed independently   weight shifting   tilted   left  Taken 4/13/2025 0810 by Zelda Copeland RN  Body Position:   weight shifting   supine  Skin Protection:   incontinence pads utilized   transparent dressing maintained  Intervention: Prevent and Manage VTE (Venous Thromboembolism) Risk  Recent Flowsheet Documentation  Taken 4/13/2025 1400 by Zelda Copeland RN  VTE Prevention/Management: (see MAR) other (see comments)  Taken 4/13/2025 0810 by Zelda Copeland RN  VTE Prevention/Management: (see MAR) other (see comments)  Intervention: Prevent Infection  Recent Flowsheet Documentation  Taken 4/13/2025 1600 by Zelda Copeland RN  Infection Prevention:   single patient room provided   personal protective equipment utilized   rest/sleep promoted   hand hygiene promoted   environmental surveillance performed  Taken 4/13/2025 1400 by Zelda Copeland RN  Infection Prevention:   single patient room provided   rest/sleep promoted   personal protective equipment utilized   hand hygiene promoted   environmental surveillance performed  Taken 4/13/2025 1200 by Zelda Copeland RN  Infection Prevention:   single patient room provided   rest/sleep promoted   personal protective equipment utilized   hand hygiene promoted   environmental  surveillance performed  Taken 4/13/2025 1000 by Zelda Copeland RN  Infection Prevention:   single patient room provided   rest/sleep promoted   personal protective equipment utilized   hand hygiene promoted   environmental surveillance performed  Taken 4/13/2025 0810 by Zelda Copeland RN  Infection Prevention:   single patient room provided   rest/sleep promoted   hand hygiene promoted   personal protective equipment utilized   environmental surveillance performed  Goal: Optimal Comfort and Wellbeing  Intervention: Provide Person-Centered Care  Recent Flowsheet Documentation  Taken 4/13/2025 1400 by Zelda Copeland RN  Trust Relationship/Rapport:   care explained   questions answered   questions encouraged   thoughts/feelings acknowledged  Taken 4/13/2025 0810 by Zelda Copeland RN  Trust Relationship/Rapport:   care explained   questions answered   questions encouraged   thoughts/feelings acknowledged     Problem: Comorbidity Management  Goal: Maintenance of COPD Symptom Control  Intervention: Maintain COPD (Chronic Obstructive Pulmonary Disease) Symptom Control  Recent Flowsheet Documentation  Taken 4/13/2025 1600 by Zelda Copeland RN  Medication Review/Management: medications reviewed  Taken 4/13/2025 1400 by Zelda Copeland RN  Medication Review/Management: medications reviewed  Taken 4/13/2025 1200 by Zelda Copeland RN  Medication Review/Management: medications reviewed  Taken 4/13/2025 0810 by Zelda Copeland RN  Medication Review/Management: medications reviewed  Goal: Blood Pressure in Desired Range  Intervention: Maintain Blood Pressure Management  Recent Flowsheet Documentation  Taken 4/13/2025 1600 by Zelda Copeland RN  Medication Review/Management: medications reviewed  Taken 4/13/2025 1400 by Zelda Copeland RN  Medication Review/Management: medications reviewed  Taken 4/13/2025 1200 by Zelda Copeland RN  Medication Review/Management: medications reviewed  Taken 4/13/2025 0810 by  Zelda Copeland RN  Medication Review/Management: medications reviewed     Problem: Fall Injury Risk  Goal: Absence of Fall and Fall-Related Injury  Intervention: Identify and Manage Contributors  Recent Flowsheet Documentation  Taken 4/13/2025 1600 by Zelda Copeland RN  Medication Review/Management: medications reviewed  Taken 4/13/2025 1400 by Zelda Copeland RN  Medication Review/Management: medications reviewed  Taken 4/13/2025 1200 by Zelda Copeland RN  Medication Review/Management: medications reviewed  Taken 4/13/2025 0810 by Zelda Copeland RN  Medication Review/Management: medications reviewed  Intervention: Promote Injury-Free Environment  Recent Flowsheet Documentation  Taken 4/13/2025 1600 by Zelda Copeland RN  Safety Promotion/Fall Prevention:   safety round/check completed   room organization consistent   nonskid shoes/slippers when out of bed   muscle strengthening facilitated   lighting adjusted   fall prevention program maintained   clutter free environment maintained   assistive device/personal items within reach   activity supervised  Taken 4/13/2025 1400 by Zelda Copeland RN  Safety Promotion/Fall Prevention:   safety round/check completed   room organization consistent   nonskid shoes/slippers when out of bed   muscle strengthening facilitated   lighting adjusted   fall prevention program maintained   clutter free environment maintained   activity supervised   assistive device/personal items within reach  Taken 4/13/2025 1200 by Zelda Copeland RN  Safety Promotion/Fall Prevention:   safety round/check completed   room organization consistent   nonskid shoes/slippers when out of bed   muscle strengthening facilitated   lighting adjusted   fall prevention program maintained   clutter free environment maintained   assistive device/personal items within reach   activity supervised  Taken 4/13/2025 1000 by Zelda Copeland RN  Safety Promotion/Fall Prevention:   safety round/check  completed   room organization consistent   nonskid shoes/slippers when out of bed   muscle strengthening facilitated   lighting adjusted   fall prevention program maintained   clutter free environment maintained   assistive device/personal items within reach   activity supervised  Taken 4/13/2025 0810 by Zelda Copeland RN  Safety Promotion/Fall Prevention:   safety round/check completed   room organization consistent   nonskid shoes/slippers when out of bed   muscle strengthening facilitated   lighting adjusted   fall prevention program maintained   clutter free environment maintained   assistive device/personal items within reach   activity supervised     Problem: Skin Injury Risk Increased  Goal: Skin Health and Integrity  Intervention: Optimize Skin Protection  Recent Flowsheet Documentation  Taken 4/13/2025 1600 by Zelda Copeland, RN  Activity Management: activity encouraged  Head of Bed (HOB) Positioning: HOB elevated  Taken 4/13/2025 1400 by Zelda Copeland RN  Activity Management: activity encouraged  Pressure Reduction Techniques:   frequent weight shift encouraged   weight shift assistance provided  Head of Bed (HOB) Positioning: HOB elevated  Pressure Reduction Devices: positioning supports utilized  Skin Protection:   incontinence pads utilized   transparent dressing maintained  Taken 4/13/2025 1200 by Zelda Copeland RN  Activity Management: activity encouraged  Head of Bed (HOB) Positioning: HOB at 20-30 degrees  Taken 4/13/2025 1000 by Zelda Copeland RN  Activity Management: activity encouraged  Head of Bed (HOB) Positioning: HOB at 20-30 degrees  Taken 4/13/2025 0810 by Zelda Copeland RN  Activity Management: activity encouraged  Pressure Reduction Techniques:   frequent weight shift encouraged   weight shift assistance provided   pressure points protected  Head of Bed (HOB) Positioning: HOB elevated  Pressure Reduction Devices: positioning supports utilized  Skin Protection:   incontinence  pads utilized   transparent dressing maintained   Goal Outcome Evaluation:         No changes. VS stable, BIPAP in use, A&O x4. Bed in low position, bed alarm on, call light within reach. Still on continuous BIPAP.

## 2025-04-14 LAB
ALBUMIN SERPL-MCNC: 3.1 G/DL (ref 3.5–5.2)
ALBUMIN/GLOB SERPL: 1.3 G/DL
ALP SERPL-CCNC: 142 U/L (ref 39–117)
ALT SERPL W P-5'-P-CCNC: 27 U/L (ref 1–41)
ANION GAP SERPL CALCULATED.3IONS-SCNC: 11.2 MMOL/L (ref 5–15)
AST SERPL-CCNC: 20 U/L (ref 1–40)
BASOPHILS # BLD AUTO: 0.01 10*3/MM3 (ref 0–0.2)
BASOPHILS NFR BLD AUTO: 0.1 % (ref 0–1.5)
BILIRUB SERPL-MCNC: 0.2 MG/DL (ref 0–1.2)
BUN SERPL-MCNC: 27 MG/DL (ref 8–23)
BUN/CREAT SERPL: 32.1 (ref 7–25)
CALCIUM SPEC-SCNC: 8.2 MG/DL (ref 8.6–10.5)
CHLORIDE SERPL-SCNC: 101 MMOL/L (ref 98–107)
CO2 SERPL-SCNC: 24.8 MMOL/L (ref 22–29)
CREAT SERPL-MCNC: 0.84 MG/DL (ref 0.76–1.27)
CRP SERPL-MCNC: 0.69 MG/DL (ref 0–0.5)
DEPRECATED RDW RBC AUTO: 39.5 FL (ref 37–54)
EGFRCR SERPLBLD CKD-EPI 2021: 88.2 ML/MIN/1.73
EOSINOPHIL # BLD AUTO: 0 10*3/MM3 (ref 0–0.4)
EOSINOPHIL NFR BLD AUTO: 0 % (ref 0.3–6.2)
ERYTHROCYTE [DISTWIDTH] IN BLOOD BY AUTOMATED COUNT: 12.3 % (ref 12.3–15.4)
GLOBULIN UR ELPH-MCNC: 2.4 GM/DL
GLUCOSE SERPL-MCNC: 129 MG/DL (ref 65–99)
HCT VFR BLD AUTO: 38.5 % (ref 37.5–51)
HEMOCCULT STL QL: NEGATIVE
HGB BLD-MCNC: 12.3 G/DL (ref 13–17.7)
IMM GRANULOCYTES # BLD AUTO: 0.06 10*3/MM3 (ref 0–0.05)
IMM GRANULOCYTES NFR BLD AUTO: 0.5 % (ref 0–0.5)
LDH SERPL-CCNC: 206 U/L (ref 135–225)
LYMPHOCYTES # BLD AUTO: 0.5 10*3/MM3 (ref 0.7–3.1)
LYMPHOCYTES NFR BLD AUTO: 4.5 % (ref 19.6–45.3)
MCH RBC QN AUTO: 28.2 PG (ref 26.6–33)
MCHC RBC AUTO-ENTMCNC: 31.9 G/DL (ref 31.5–35.7)
MCV RBC AUTO: 88.3 FL (ref 79–97)
MONOCYTES # BLD AUTO: 0.14 10*3/MM3 (ref 0.1–0.9)
MONOCYTES NFR BLD AUTO: 1.3 % (ref 5–12)
NEUTROPHILS NFR BLD AUTO: 10.28 10*3/MM3 (ref 1.7–7)
NEUTROPHILS NFR BLD AUTO: 93.6 % (ref 42.7–76)
NRBC BLD AUTO-RTO: 0 /100 WBC (ref 0–0.2)
PLATELET # BLD AUTO: 200 10*3/MM3 (ref 140–450)
PMV BLD AUTO: 9.3 FL (ref 6–12)
POTASSIUM SERPL-SCNC: 4.6 MMOL/L (ref 3.5–5.2)
PROT SERPL-MCNC: 5.5 G/DL (ref 6–8.5)
RBC # BLD AUTO: 4.36 10*6/MM3 (ref 4.14–5.8)
SODIUM SERPL-SCNC: 137 MMOL/L (ref 136–145)
WBC NRBC COR # BLD AUTO: 10.99 10*3/MM3 (ref 3.4–10.8)

## 2025-04-14 PROCEDURE — 94664 DEMO&/EVAL PT USE INHALER: CPT

## 2025-04-14 PROCEDURE — 86140 C-REACTIVE PROTEIN: CPT | Performed by: INTERNAL MEDICINE

## 2025-04-14 PROCEDURE — 94799 UNLISTED PULMONARY SVC/PX: CPT

## 2025-04-14 PROCEDURE — 94761 N-INVAS EAR/PLS OXIMETRY MLT: CPT

## 2025-04-14 PROCEDURE — 25010000002 DEXAMETHASONE PER 1 MG: Performed by: INTERNAL MEDICINE

## 2025-04-14 PROCEDURE — 25010000002 REMDESIVIR 100 MG/20ML SOLUTION 1 EACH VIAL: Performed by: NURSE PRACTITIONER

## 2025-04-14 PROCEDURE — 85025 COMPLETE CBC W/AUTO DIFF WBC: CPT | Performed by: INTERNAL MEDICINE

## 2025-04-14 PROCEDURE — 80053 COMPREHEN METABOLIC PANEL: CPT | Performed by: INTERNAL MEDICINE

## 2025-04-14 PROCEDURE — 25810000003 SODIUM CHLORIDE 0.9 % SOLUTION 250 ML FLEX CONT: Performed by: NURSE PRACTITIONER

## 2025-04-14 PROCEDURE — 63710000001 DEXAMETHASONE PER 0.25 MG: Performed by: INTERNAL MEDICINE

## 2025-04-14 PROCEDURE — 94660 CPAP INITIATION&MGMT: CPT

## 2025-04-14 PROCEDURE — 82272 OCCULT BLD FECES 1-3 TESTS: CPT | Performed by: INTERNAL MEDICINE

## 2025-04-14 PROCEDURE — 83615 LACTATE (LD) (LDH) ENZYME: CPT | Performed by: INTERNAL MEDICINE

## 2025-04-14 RX ADMIN — APIXABAN 5 MG: 5 TABLET, FILM COATED ORAL at 08:40

## 2025-04-14 RX ADMIN — SODIUM CHLORIDE 100 MG: 9 INJECTION, SOLUTION INTRAVENOUS at 16:10

## 2025-04-14 RX ADMIN — METOPROLOL TARTRATE 25 MG: 25 TABLET, FILM COATED ORAL at 21:42

## 2025-04-14 RX ADMIN — DEXAMETHASONE SODIUM PHOSPHATE 6 MG: 10 INJECTION INTRAMUSCULAR; INTRAVENOUS at 03:48

## 2025-04-14 RX ADMIN — GUAIFENESIN 600 MG: 600 TABLET, EXTENDED RELEASE ORAL at 21:41

## 2025-04-14 RX ADMIN — DEXAMETHASONE 6 MG: 2 TABLET ORAL at 08:54

## 2025-04-14 RX ADMIN — BUDESONIDE AND FORMOTEROL FUMARATE DIHYDRATE 2 PUFF: 160; 4.5 AEROSOL RESPIRATORY (INHALATION) at 07:08

## 2025-04-14 RX ADMIN — GUAIFENESIN 600 MG: 600 TABLET, EXTENDED RELEASE ORAL at 08:39

## 2025-04-14 RX ADMIN — DEXAMETHASONE 6 MG: 2 TABLET ORAL at 21:43

## 2025-04-14 RX ADMIN — METOPROLOL TARTRATE 25 MG: 25 TABLET, FILM COATED ORAL at 08:40

## 2025-04-14 RX ADMIN — IPRATROPIUM BROMIDE AND ALBUTEROL SULFATE 3 ML: .5; 3 SOLUTION RESPIRATORY (INHALATION) at 10:53

## 2025-04-14 RX ADMIN — ATORVASTATIN CALCIUM 80 MG: 80 TABLET, FILM COATED ORAL at 08:40

## 2025-04-14 RX ADMIN — IPRATROPIUM BROMIDE AND ALBUTEROL SULFATE 3 ML: .5; 3 SOLUTION RESPIRATORY (INHALATION) at 07:02

## 2025-04-14 RX ADMIN — DEXAMETHASONE 6 MG: 2 TABLET ORAL at 16:08

## 2025-04-14 RX ADMIN — ASPIRIN 81 MG: 81 TABLET, COATED ORAL at 08:40

## 2025-04-14 RX ADMIN — Medication 1000 MCG: at 08:40

## 2025-04-14 RX ADMIN — IPRATROPIUM BROMIDE AND ALBUTEROL SULFATE 3 ML: .5; 3 SOLUTION RESPIRATORY (INHALATION) at 14:48

## 2025-04-14 RX ADMIN — FAMOTIDINE 20 MG: 20 TABLET, FILM COATED ORAL at 08:40

## 2025-04-14 RX ADMIN — BUDESONIDE AND FORMOTEROL FUMARATE DIHYDRATE 2 PUFF: 160; 4.5 AEROSOL RESPIRATORY (INHALATION) at 20:14

## 2025-04-14 RX ADMIN — AZITHROMYCIN 250 MG: 250 TABLET, FILM COATED ORAL at 08:40

## 2025-04-14 RX ADMIN — APIXABAN 5 MG: 5 TABLET, FILM COATED ORAL at 21:41

## 2025-04-14 RX ADMIN — FAMOTIDINE 20 MG: 20 TABLET, FILM COATED ORAL at 21:41

## 2025-04-14 RX ADMIN — IPRATROPIUM BROMIDE AND ALBUTEROL SULFATE 3 ML: .5; 3 SOLUTION RESPIRATORY (INHALATION) at 20:16

## 2025-04-14 RX ADMIN — IPRATROPIUM BROMIDE AND ALBUTEROL SULFATE 3 ML: .5; 3 SOLUTION RESPIRATORY (INHALATION) at 03:21

## 2025-04-14 NOTE — PROGRESS NOTES
Enter Query Response Below      Query Response: Hyperglycemia secondary to steroid (expected)             If applicable, please update the problem list.

## 2025-04-14 NOTE — PLAN OF CARE
Problem: Adult Inpatient Plan of Care  Goal: Absence of Hospital-Acquired Illness or Injury  Intervention: Identify and Manage Fall Risk  Recent Flowsheet Documentation  Taken 4/14/2025 1800 by Zelda Copeland RN  Safety Promotion/Fall Prevention:   room organization consistent   safety round/check completed   nonskid shoes/slippers when out of bed   muscle strengthening facilitated   lighting adjusted   fall prevention program maintained   clutter free environment maintained   assistive device/personal items within reach   activity supervised  Taken 4/14/2025 1600 by Zelda Copeland RN  Safety Promotion/Fall Prevention:   safety round/check completed   room organization consistent   nonskid shoes/slippers when out of bed   muscle strengthening facilitated   lighting adjusted   fall prevention program maintained   clutter free environment maintained   assistive device/personal items within reach   activity supervised  Taken 4/14/2025 1400 by Zelda Copeland RN  Safety Promotion/Fall Prevention:   safety round/check completed   room organization consistent   nonskid shoes/slippers when out of bed   muscle strengthening facilitated   lighting adjusted   fall prevention program maintained   clutter free environment maintained   assistive device/personal items within reach   activity supervised  Taken 4/14/2025 1200 by Zelda Copeland RN  Safety Promotion/Fall Prevention:   safety round/check completed   room organization consistent   nonskid shoes/slippers when out of bed   muscle strengthening facilitated   mobility aid in reach   lighting adjusted   fall prevention program maintained   assistive device/personal items within reach   activity supervised   clutter free environment maintained  Taken 4/14/2025 1000 by Zelda Copeland RN  Safety Promotion/Fall Prevention:   safety round/check completed   room organization consistent   nonskid shoes/slippers when out of bed   muscle strengthening facilitated    mobility aid in reach   lighting adjusted   fall prevention program maintained   assistive device/personal items within reach   activity supervised   clutter free environment maintained  Taken 4/14/2025 0800 by Zelda Copeland RN  Safety Promotion/Fall Prevention:   safety round/check completed   room organization consistent   nonskid shoes/slippers when out of bed   muscle strengthening facilitated   mobility aid in reach   lighting adjusted   fall prevention program maintained   clutter free environment maintained   assistive device/personal items within reach   activity supervised  Intervention: Prevent Skin Injury  Recent Flowsheet Documentation  Taken 4/14/2025 1800 by Zelda Copeland RN  Body Position: position changed independently  Taken 4/14/2025 1600 by Zelda Copeland RN  Body Position:   position changed independently   sitting up in bed  Taken 4/14/2025 1400 by Zelda Copeland RN  Body Position:   position changed independently   left   tilted  Skin Protection: incontinence pads utilized  Taken 4/14/2025 1200 by Zelda Copeland RN  Body Position:   position changed independently   supine  Taken 4/14/2025 1000 by Zelda Copeland RN  Body Position:   position changed independently   right   tilted  Taken 4/14/2025 0800 by Zelda Copeland RN  Body Position:   position changed independently   supine  Skin Protection:   incontinence pads utilized   transparent dressing maintained  Intervention: Prevent and Manage VTE (Venous Thromboembolism) Risk  Recent Flowsheet Documentation  Taken 4/14/2025 0800 by Zelda Copeland RN  VTE Prevention/Management: (see mar)   bilateral   patient refused intervention   SCDs (sequential compression devices) off  Intervention: Prevent Infection  Recent Flowsheet Documentation  Taken 4/14/2025 1800 by Zelda Copeland RN  Infection Prevention:   single patient room provided   rest/sleep promoted   personal protective equipment utilized   hand hygiene promoted    environmental surveillance performed  Taken 4/14/2025 1600 by Zelda Copeland RN  Infection Prevention:   rest/sleep promoted   single patient room provided   personal protective equipment utilized   hand hygiene promoted   environmental surveillance performed  Taken 4/14/2025 1400 by Zelda Copeland RN  Infection Prevention:   single patient room provided   rest/sleep promoted   personal protective equipment utilized   hand hygiene promoted   environmental surveillance performed  Taken 4/14/2025 1200 by Zelda Copeland RN  Infection Prevention:   single patient room provided   rest/sleep promoted   hand hygiene promoted   personal protective equipment utilized   environmental surveillance performed  Taken 4/14/2025 1000 by Zelda Copeland RN  Infection Prevention:   single patient room provided   rest/sleep promoted   personal protective equipment utilized   hand hygiene promoted   environmental surveillance performed  Taken 4/14/2025 0800 by Zelda Copeland RN  Infection Prevention:   single patient room provided   rest/sleep promoted   personal protective equipment utilized   hand hygiene promoted   environmental surveillance performed  Goal: Optimal Comfort and Wellbeing  Intervention: Provide Person-Centered Care  Recent Flowsheet Documentation  Taken 4/14/2025 1400 by Zelda Copeland RN  Trust Relationship/Rapport:   questions answered   care explained   thoughts/feelings acknowledged  Taken 4/14/2025 0800 by Zelda Copeland RN  Trust Relationship/Rapport:   care explained   questions answered   thoughts/feelings acknowledged     Problem: Comorbidity Management  Goal: Maintenance of COPD Symptom Control  Intervention: Maintain COPD (Chronic Obstructive Pulmonary Disease) Symptom Control  Recent Flowsheet Documentation  Taken 4/14/2025 1800 by Zelda Copeland RN  Medication Review/Management: medications reviewed  Taken 4/14/2025 1600 by Zelda Copeland RN  Medication Review/Management: medications  reviewed  Taken 4/14/2025 1400 by Zelda Copeland RN  Medication Review/Management: medications reviewed  Taken 4/14/2025 1200 by Zelda Copeland RN  Medication Review/Management: medications reviewed  Taken 4/14/2025 1000 by Zelda Copeland RN  Medication Review/Management: medications reviewed  Taken 4/14/2025 0800 by Zelda Copeland RN  Medication Review/Management: medications reviewed  Goal: Blood Pressure in Desired Range  Intervention: Maintain Blood Pressure Management  Recent Flowsheet Documentation  Taken 4/14/2025 1800 by Zelda Copeland RN  Medication Review/Management: medications reviewed  Taken 4/14/2025 1600 by Zelda Copeland RN  Medication Review/Management: medications reviewed  Taken 4/14/2025 1400 by Zelda Copeland RN  Medication Review/Management: medications reviewed  Taken 4/14/2025 1200 by Zelda Copeland RN  Medication Review/Management: medications reviewed  Taken 4/14/2025 1000 by Zelda Copeland RN  Medication Review/Management: medications reviewed  Taken 4/14/2025 0800 by Zelda Copeland RN  Medication Review/Management: medications reviewed     Problem: Fall Injury Risk  Goal: Absence of Fall and Fall-Related Injury  Intervention: Identify and Manage Contributors  Recent Flowsheet Documentation  Taken 4/14/2025 1800 by Zelda Copeland RN  Medication Review/Management: medications reviewed  Taken 4/14/2025 1600 by Zelda Copeland RN  Medication Review/Management: medications reviewed  Taken 4/14/2025 1400 by Zelda Copeland RN  Medication Review/Management: medications reviewed  Taken 4/14/2025 1200 by Zelda Copeland RN  Medication Review/Management: medications reviewed  Taken 4/14/2025 1000 by Zelda Copeland RN  Medication Review/Management: medications reviewed  Taken 4/14/2025 0800 by Zelda Copeland RN  Medication Review/Management: medications reviewed  Intervention: Promote Injury-Free Environment  Recent Flowsheet Documentation  Taken 4/14/2025 1800 by  Zelda Copeland RN  Safety Promotion/Fall Prevention:   room organization consistent   safety round/check completed   nonskid shoes/slippers when out of bed   muscle strengthening facilitated   lighting adjusted   fall prevention program maintained   clutter free environment maintained   assistive device/personal items within reach   activity supervised  Taken 4/14/2025 1600 by Zelda Copeland RN  Safety Promotion/Fall Prevention:   safety round/check completed   room organization consistent   nonskid shoes/slippers when out of bed   muscle strengthening facilitated   lighting adjusted   fall prevention program maintained   clutter free environment maintained   assistive device/personal items within reach   activity supervised  Taken 4/14/2025 1400 by Zelda Copeland RN  Safety Promotion/Fall Prevention:   safety round/check completed   room organization consistent   nonskid shoes/slippers when out of bed   muscle strengthening facilitated   lighting adjusted   fall prevention program maintained   clutter free environment maintained   assistive device/personal items within reach   activity supervised  Taken 4/14/2025 1200 by Zelda Copeland RN  Safety Promotion/Fall Prevention:   safety round/check completed   room organization consistent   nonskid shoes/slippers when out of bed   muscle strengthening facilitated   mobility aid in reach   lighting adjusted   fall prevention program maintained   assistive device/personal items within reach   activity supervised   clutter free environment maintained  Taken 4/14/2025 1000 by Zelda Copeland RN  Safety Promotion/Fall Prevention:   safety round/check completed   room organization consistent   nonskid shoes/slippers when out of bed   muscle strengthening facilitated   mobility aid in reach   lighting adjusted   fall prevention program maintained   assistive device/personal items within reach   activity supervised   clutter free environment maintained  Taken 4/14/2025  0800 by Zelda Copeland RN  Safety Promotion/Fall Prevention:   safety round/check completed   room organization consistent   nonskid shoes/slippers when out of bed   muscle strengthening facilitated   mobility aid in reach   lighting adjusted   fall prevention program maintained   clutter free environment maintained   assistive device/personal items within reach   activity supervised     Problem: Skin Injury Risk Increased  Goal: Skin Health and Integrity  Intervention: Optimize Skin Protection  Recent Flowsheet Documentation  Taken 4/14/2025 1800 by Zelda Copeland RN  Activity Management: activity encouraged  Head of Bed (HOB) Positioning: HOB elevated  Taken 4/14/2025 1600 by Zelda Copeland RN  Activity Management: activity encouraged  Head of Bed (HOB) Positioning: HOB elevated  Taken 4/14/2025 1400 by Zelda Copeland RN  Activity Management: activity encouraged  Pressure Reduction Techniques:   weight shift assistance provided   frequent weight shift encouraged  Head of Bed (HOB) Positioning: HOB at 20-30 degrees  Pressure Reduction Devices: positioning supports utilized  Skin Protection: incontinence pads utilized  Taken 4/14/2025 1200 by Zelda Copeland RN  Activity Management: activity encouraged  Head of Bed (HOB) Positioning: HOB elevated  Taken 4/14/2025 1053 by Zelda Copeland RN  Activity Management:   up to bedside commode   back to bed  Taken 4/14/2025 1000 by Zelda Copeland RN  Activity Management: activity encouraged  Head of Bed (HOB) Positioning: HOB elevated  Taken 4/14/2025 0800 by Zelda Copeland RN  Activity Management: activity encouraged  Pressure Reduction Techniques:   frequent weight shift encouraged   weight shift assistance provided  Head of Bed (HOB) Positioning: HOB elevated  Pressure Reduction Devices: positioning supports utilized  Skin Protection:   incontinence pads utilized   transparent dressing maintained   Goal Outcome Evaluation:      No changes, A&O x4, VS stable,  BIPAP continuously. Assist x1/standby with walker. Bed alarm on, call light within reach.

## 2025-04-14 NOTE — PLAN OF CARE
Problem: Adult Inpatient Plan of Care  Goal: Plan of Care Review  Flowsheets (Taken 4/14/2025 0615)  Progress: improving  Outcome Evaluation: No changes. Bipap in use. VSS. Makes needs known. Refusing turns @ times. Educated on importance. Verbalized understanding. Still refusing. Bed locked and in lowest position. Call light within reach.  Goal: Absence of Hospital-Acquired Illness or Injury  Intervention: Identify and Manage Fall Risk  Flowsheets  Taken 4/14/2025 0609  Safety Promotion/Fall Prevention:   activity supervised   assistive device/personal items within reach   clutter free environment maintained   fall prevention program maintained   lighting adjusted   nonskid shoes/slippers when out of bed   room organization consistent   safety round/check completed  Taken 4/14/2025 0457  Safety Promotion/Fall Prevention:   activity supervised   assistive device/personal items within reach   clutter free environment maintained   fall prevention program maintained   lighting adjusted   nonskid shoes/slippers when out of bed   room organization consistent   safety round/check completed  Taken 4/14/2025 0214  Safety Promotion/Fall Prevention:   activity supervised   assistive device/personal items within reach   clutter free environment maintained   fall prevention program maintained   lighting adjusted   nonskid shoes/slippers when out of bed   room organization consistent   safety round/check completed  Taken 4/14/2025 0044  Safety Promotion/Fall Prevention:   activity supervised   assistive device/personal items within reach   clutter free environment maintained   fall prevention program maintained   lighting adjusted   nonskid shoes/slippers when out of bed   room organization consistent   safety round/check completed  Taken 4/13/2025 2215  Safety Promotion/Fall Prevention:   activity supervised   assistive device/personal items within reach   clutter free environment maintained   fall prevention program  maintained   lighting adjusted   nonskid shoes/slippers when out of bed   room organization consistent   safety round/check completed  Taken 4/13/2025 2016  Safety Promotion/Fall Prevention:   activity supervised   assistive device/personal items within reach   clutter free environment maintained   fall prevention program maintained   lighting adjusted   nonskid shoes/slippers when out of bed   room organization consistent   safety round/check completed  Intervention: Prevent Skin Injury  Flowsheets  Taken 4/14/2025 0609  Body Position: position changed independently  Taken 4/14/2025 0457  Body Position: position changed independently  Taken 4/14/2025 0214  Body Position: position changed independently  Taken 4/14/2025 0044  Body Position: position changed independently  Taken 4/13/2025 2215  Body Position: position changed independently  Taken 4/13/2025 2016  Body Position: position changed independently  Skin Protection:   incontinence pads utilized   transparent dressing maintained  Intervention: Prevent and Manage VTE (Venous Thromboembolism) Risk  Flowsheets (Taken 4/13/2025 2016)  VTE Prevention/Management: (takes eliquis)   bilateral   SCDs (sequential compression devices) off   patient refused intervention   other (see comments)  Intervention: Prevent Infection  Flowsheets (Taken 4/14/2025 0100 by Grace Cavazos, PCT)  Infection Prevention:   single patient room provided   rest/sleep promoted   hand hygiene promoted   environmental surveillance performed  Goal: Optimal Comfort and Wellbeing  Intervention: Provide Person-Centered Care  Flowsheets (Taken 4/13/2025 2016)  Trust Relationship/Rapport:   care explained   choices provided   emotional support provided   empathic listening provided   questions answered   questions encouraged   reassurance provided   thoughts/feelings acknowledged  Goal: Readiness for Transition of Care  Intervention: Mutually Develop Transition Plan  Flowsheets (Taken 4/11/2025  5945 by Brinda Fernandes, RN)  Equipment Needed After Discharge: none  Equipment Currently Used at Home:   walker, spencer   bp cuff   bipap   oxygen   rollator   wheelchair   shower chair   grab bar  Transportation Anticipated: other (see comments)  Transportation Concerns: none  Concerns to be Addressed: adjustment to diagnosis/illness  Readmission Within the Last 30 Days: no previous admission in last 30 days  Patient/Family Anticipated Services at Transition: hospice care  Patient/Family Anticipates Transition to:   home with family   home with help/services     Problem: Comorbidity Management  Goal: Maintenance of COPD Symptom Control  Intervention: Maintain COPD (Chronic Obstructive Pulmonary Disease) Symptom Control  Flowsheets (Taken 4/13/2025 1800 by Zelda Copeland, RN)  Medication Review/Management: medications reviewed  Goal: Blood Pressure in Desired Range  Intervention: Maintain Blood Pressure Management  Flowsheets (Taken 4/13/2025 1800 by Zelda Copeland, RN)  Medication Review/Management: medications reviewed     Problem: Noninvasive Ventilation Acute  Goal: Effective Unassisted Ventilation and Oxygenation  Intervention: Monitor and Manage Noninvasive Ventilation  Flowsheets (Taken 4/13/2025 2016)  Airway/Ventilation Management: airway patency maintained     Problem: Fall Injury Risk  Goal: Absence of Fall and Fall-Related Injury  Intervention: Identify and Manage Contributors  Flowsheets (Taken 4/13/2025 1800 by Zelda Copeland, RN)  Medication Review/Management: medications reviewed  Intervention: Promote Injury-Free Environment  Flowsheets  Taken 4/14/2025 0634  Safety Promotion/Fall Prevention:   activity supervised   assistive device/personal items within reach   clutter free environment maintained   fall prevention program maintained   lighting adjusted   nonskid shoes/slippers when out of bed   room organization consistent   safety round/check completed  Taken 4/14/2025 5995  Safety  Promotion/Fall Prevention:   activity supervised   assistive device/personal items within reach   clutter free environment maintained   fall prevention program maintained   lighting adjusted   nonskid shoes/slippers when out of bed   room organization consistent   safety round/check completed  Taken 4/14/2025 0214  Safety Promotion/Fall Prevention:   activity supervised   assistive device/personal items within reach   clutter free environment maintained   fall prevention program maintained   lighting adjusted   nonskid shoes/slippers when out of bed   room organization consistent   safety round/check completed  Taken 4/14/2025 0044  Safety Promotion/Fall Prevention:   activity supervised   assistive device/personal items within reach   clutter free environment maintained   fall prevention program maintained   lighting adjusted   nonskid shoes/slippers when out of bed   room organization consistent   safety round/check completed  Taken 4/13/2025 2215  Safety Promotion/Fall Prevention:   activity supervised   assistive device/personal items within reach   clutter free environment maintained   fall prevention program maintained   lighting adjusted   nonskid shoes/slippers when out of bed   room organization consistent   safety round/check completed  Taken 4/13/2025 2016  Safety Promotion/Fall Prevention:   activity supervised   assistive device/personal items within reach   clutter free environment maintained   fall prevention program maintained   lighting adjusted   nonskid shoes/slippers when out of bed   room organization consistent   safety round/check completed     Problem: Skin Injury Risk Increased  Goal: Skin Health and Integrity  Intervention: Optimize Skin Protection  Flowsheets  Taken 4/14/2025 0609  Activity Management: activity encouraged  Head of Bed (HOB) Positioning: HOB elevated  Taken 4/14/2025 0457  Activity Management: activity encouraged  Head of Bed (HOB) Positioning: HOB elevated  Taken 4/14/2025  0214  Activity Management: activity encouraged  Head of Bed (HOB) Positioning: HOB elevated  Taken 4/14/2025 0044  Activity Management: activity encouraged  Head of Bed (HOB) Positioning: HOB elevated  Taken 4/13/2025 2215  Activity Management: activity encouraged  Head of Bed (HOB) Positioning:   HOB elevated   HOB at 20-30 degrees  Taken 4/13/2025 2016  Activity Management: activity encouraged  Pressure Reduction Techniques:   frequent weight shift encouraged   weight shift assistance provided  Head of Bed (HOB) Positioning:   HOB elevated   HOB at 20-30 degrees  Pressure Reduction Devices: positioning supports utilized  Skin Protection:   incontinence pads utilized   transparent dressing maintained   Goal Outcome Evaluation:  Plan of Care Reviewed With: patient        Progress: improving  Outcome Evaluation: No changes. Bipap in use. VSS. Makes needs known. Refusing turns @ times. Educated on importance. Verbalized understanding. Still refusing. Bed locked and in lowest position. Call light within reach.

## 2025-04-14 NOTE — PROGRESS NOTES
[facility]  [phone]  Dr. Tolu Aldana    Subjective:  LOS: 4    Chief Complaint: Respiratory failure    Remains on BiPAP.  States he took it off and walk to the bathroom etc.  States he tolerated okay.    Objective   Vital Signs past 24hrs  Temp range: Temp (24hrs), Av.8 °F (36.6 °C), Min:97.7 °F (36.5 °C), Max:97.8 °F (36.6 °C)    BP range: BP: (121-145)/(56-84) 124/57  Pulse range: Heart Rate:  [65-80] 73  Resp rate range: Resp:  [21-28] 26  Device (Oxygen Therapy): NPPV/NIV   Oxygen range:SpO2:  [90 %-100 %] 100 %   Mechanical Ventilator:     Physical Exam  Eyes:      Pupils: Pupils are equal, round, and reactive to light.   Cardiovascular:      Rate and Rhythm: Normal rate and regular rhythm.      Heart sounds: No murmur heard.  Pulmonary:      Effort: Accessory muscle usage present.      Breath sounds: Decreased breath sounds and wheezing present.   Abdominal:      General: Bowel sounds are normal.      Palpations: Abdomen is soft. There is no mass.      Tenderness: There is no abdominal tenderness.   Musculoskeletal:         General: No swelling.   Neurological:      Mental Status: He is alert.       Results Review:    I have reviewed the laboratory and imaging data since the last note by [facility] physician.  My annotations are noted in assessment and plan.      Result Review:  I have personally reviewed the results from last note by [facility] physician to 2025 13:59 EDT and agree with these findings:  [x]  Laboratory list / accordion  [x]  Microbiology  [x]  Radiology  []  EKG/Telemetry   []  Cardiology/Vascular   []  Pathology  []  Old records  []  Other:      Medication Review:  I have reviewed the current MAR.  My annotations are noted in assessment and plan.    apixaban, 5 mg, Oral, Q12H  aspirin, 81 mg, Oral, Daily  atorvastatin, 80 mg, Oral, Daily  azithromycin, 250 mg, Oral, Q24H  budesonide-formoterol, 2 puff, Inhalation, BID - RT  dexAMETHasone, 6 mg, Oral, Q6H    Or  dexAMETHasone, 6 mg, Intravenous, Q6H  famotidine, 20 mg, Oral, BID  guaiFENesin, 600 mg, Oral, Q12H  ipratropium-albuterol, 3 mL, Nebulization, Q4H - RT  metoprolol tartrate, 25 mg, Oral, BID  polyethylene glycol, 17 g, Oral, Daily  remdesivir, 100 mg, Intravenous, Q24H  vitamin B-12, 1,000 mcg, Oral, Daily             Lines, Drains & Airways       Active LDAs       Name Placement date Placement time Site Days    Peripheral IV 04/11/25 1135 Anterior;Right Forearm 04/11/25  1135  Forearm  1                    [facility] Problems  Acute on chronic hypercapnic respiratory failure  Acute hypoxic respiratory failure  Home BIPAP  COVID-19 infection  COPD with acute exacerbation  Paroxysmal atrial fibrillation  Coronary artery disease  Ischemic cardiomyopathy  Hyperlipidemia  Hypertension  [facility]          Plan of Treatment    Currently using noninvasive ventilation and with benefit.  Appears to tolerate nasal cannula also.  He prefers to remain on noninvasive ventilation.    Patient was asked to bring in his home BiPAP machine to see if it is set appropriately for his chronic respiratory failure.    Currently with active wheezing.  COPD exacerbation likely precipitated by COVID-19.  Wheezing intensity continues to improve slightly.  Keep IV dexamethasone same today.  Will need taper on discharge.    Currently on treatment for COVID-19 with remdesivir.  Also on dexamethasone as outlined.    No pulmonary infiltrates.  No need for antibiotics at this time.  Patient is on azithromycin for COPD management.    [facility]        Tolu Aldana MD  04/14/25  13:59 EDT    Isolation status: Enhanced Droplet/Contact     Dietary Orders (From admission, onward)       Start     Ordered    04/11/25 0937  Dietary Nutrition Supplements Other (see comment); Boost Original  Daily With Breakfast & Dinner      Question Answer Comment   Select Supplement: Other (see comment)     Other Boost Original        04/11/25 0936    04/11/25 0100  Diet: Cardiac; Healthy Heart (2-3 Na+); Fluid Consistency: Thin (IDDSI 0)  Diet Effective Now        References:    Diet Order Definitions   Question Answer Comment   Diets: Cardiac    Cardiac Diet: Healthy Heart (2-3 Na+)    Fluid Consistency: Thin (IDDSI 0)        04/11/25 0059                    Part of this note may be an electronic transcription/translation of spoken language to printed text using the Dragon Dictation System.

## 2025-04-14 NOTE — PROGRESS NOTES
Name: Wisam Malone ADMIT: 4/10/2025   : 1945  PCP: Billie Ponce MD    MRN: [id number] LOS: 4 days   AGE/SEX: 79 y.o. male  ROOM: [facility]     Subjective   Subjective   Mild improvement of the dyspnea.  Continues with dry cough.  No chest pain.  No hemoptysis.  No palpitation.  No ankle edema.  No fever or chills    Review of Systems  GI.  No abdominal pain.  No nausea or vomiting.  .  No dysuria or hematuria.     Objective   Objective   Vital Signs  Temp:  [97.7 °F (36.5 °C)-97.8 °F (36.6 °C)] 97.8 °F (36.6 °C)  Heart Rate:  [63-78] 78  Resp:  [20-28] 25  BP: (119-145)/(54-84) 145/84  SpO2:  [76 %-100 %] 76 %  on   ;   Device (Oxygen Therapy): NPPV/NIV    Intake/Output Summary (Last 24 hours) at 2025 0829  Last data filed at 2025 0637  Gross per 24 hour   Intake 960 ml   Output 2030 ml   Net -1070 ml     Body mass index is 18.91 kg/m².      04/10/25  2315   Weight: 65 kg (143 lb 4.8 oz)     Physical Exam  General.  Elderly gentleman.  He is alert and oriented x 4.  In no apparent pain/distress/diaphoresis.  Normal mood and affect.  On BiPAP.  Eyes.  Pupils equal round and reactive.  Intact extraocular musculature.  No pallor or jaundice.  Oral cavity.  Moist mucous membrane  Neck.  Supple.  No JVD.  No lymphadenopathy or thyromegaly.  Cardiovascular.  Regular rate and rhythm with no gallops or murmurs  Chest.  Poor bilateral air entry with scattered bilateral expiratory wheezes and rhonchi.  No crackles.  Abdomen.  Soft lax.  No tenderness.  No organomegaly.  No guarding or rebound  Extremities.  No clubbing/cyanosis/edema  CNS.  No acute focal neurological deficits.    Results Review:      Results from last 7 days   Lab Units 25  0452 25  0454 25  0437 25  0528 04/10/25  1558   SODIUM mmol/L 137 136 142 141 139   POTASSIUM mmol/L 4.6 4.5 4.2 5.2 4.3   CHLORIDE mmol/L 101 102 105 104 100   CO2 mmol/L 24.8 26.0 29.9* 29.6* 30.8*   BUN mg/dL 27* 29* 30* 25* 20  "  CREATININE mg/dL 0.84 0.86 1.06 1.05 1.06   GLUCOSE mg/dL 129* 155* 78 173* 131*   CALCIUM mg/dL 8.2* 8.0* 8.4* 8.7 9.0   AST (SGOT) U/L 20 18 20 17 25   ALT (SGPT) U/L 27 25 25 24 29     Estimated Creatinine Clearance: 64.5 mL/min (by C-G formula based on SCr of 0.84 mg/dL).          Results from last 7 days   Lab Units 04/10/25  1711 04/10/25  1558   HSTROP T ng/L 32* 32*     Results from last 7 days   Lab Units 04/10/25  1558   PROBNP pg/mL 1,824.0*         Results from last 7 days   Lab Units 04/11/25  0528 04/10/25  1558   MAGNESIUM mg/dL 2.1 2.3   PHOSPHORUS mg/dL 3.1 4.0           Invalid input(s): \"LDLCALC\"  Results from last 7 days   Lab Units 04/14/25  0452 04/13/25  0454 04/12/25  0437 04/11/25  0528 04/10/25  1558   WBC 10*3/mm3 10.99* 5.03 7.23 4.45 12.91*   HEMOGLOBIN g/dL 12.3* 11.7* 12.2* 12.1* 13.7   HEMATOCRIT % 38.5 36.7* 36.8* 37.1* 43.4   PLATELETS 10*3/mm3 200 184 176 178 257   MCV fL 88.3 90.2 88.7 90.5 90.6   MCH pg 28.2 28.7 29.4 29.5 28.6   MCHC g/dL 31.9 31.9 33.2 32.6 31.6   RDW % 12.3 12.1* 12.2* 12.0* 12.1*   RDW-SD fl 39.5 39.6 39.5 39.5 40.4   MPV fL 9.3 9.5 9.2 9.3 9.0   NEUTROPHIL % % 93.6* 90.8* 72.2 86.1* 77.0*   LYMPHOCYTE % % 4.5* 7.6* 16.7* 6.7* 12.2*   MONOCYTES % % 1.3* 0.8* 9.4 6.3 9.2   EOSINOPHIL % % 0.0* 0.0* 0.6 0.0* 0.6   BASOPHIL % % 0.1 0.0 0.4 0.0 0.5   IMM GRAN % % 0.5 0.8* 0.7* 0.9* 0.5   NEUTROS ABS 10*3/mm3 10.28* 4.57 5.22 3.83 9.92*   LYMPHS ABS 10*3/mm3 0.50* 0.38* 1.21 0.30* 1.58   MONOS ABS 10*3/mm3 0.14 0.04* 0.68 0.28 1.19*   EOS ABS 10*3/mm3 0.00 0.00 0.04 0.00 0.08   BASOS ABS 10*3/mm3 0.01 0.00 0.03 0.00 0.07   IMMATURE GRANS (ABS) 10*3/mm3 0.06* 0.04 0.05 0.04 0.07*   NRBC /100 WBC 0.0 0.0 0.0 0.0 0.0     Results from last 7 days   Lab Units 04/10/25  1558   INR  1.35*     Results from last 7 days   Lab Units 04/10/25  1756 04/10/25  1600   PH, ARTERIAL pH units 7.379 7.305*   PO2 ART mm Hg 76.5* 248.8*   PCO2, ARTERIAL mm Hg 51.1* 67.0*   HCO3 ART " mmol/L 30.1* 33.4*     Results from last 7 days   Lab Units 04/13/25  0454 04/10/25  1711 04/10/25  1558   PROCALCITONIN ng/mL 0.05 0.06 0.07   LACTATE mmol/L  --   --  1.4     Results from last 7 days   Lab Units 04/14/25  0452 04/13/25  0454 04/12/25  0437 04/11/25  0528   CRP mg/dL 0.69* 1.18* 0.98* 1.72*         Results from last 7 days   Lab Units 04/10/25  1711 04/10/25  1645   BLOODCX  No growth at 3 days No growth at 3 days     Results from last 7 days   Lab Units 04/10/25  1559   ADENOVIRUS DETECTION BY PCR  Not Detected   CORONAVIRUS 229E  Not Detected   CORONAVIRUS HKU1  Not Detected   CORONAVIRUS NL63  Not Detected   CORONAVIRUS OC43  Not Detected   HUMAN METAPNEUMOVIRUS  Not Detected   HUMAN RHINOVIRUS/ENTEROVIRUS  Not Detected   INFLUENZA B PCR  Not Detected   PARAINFLUENZA 1  Not Detected   PARAINFLUENZA VIRUS 2  Not Detected   PARAINFLUENZA VIRUS 3  Not Detected   PARAINFLUENZA VIRUS 4  Not Detected   BORDETELLA PERTUSSIS PCR  Not Detected   CHLAMYDOPHILA PNEUMONIAE PCR  Not Detected   MYCOPLAMA PNEUMO PCR  Not Detected   INFLUENZA A PCR  Not Detected   RSV, PCR  Not Detected               Imaging:  Imaging Results (Last 24 Hours)       ** No results found for the last 24 hours. **               I reviewed the patient's new clinical results / labs / tests / procedures      Assessment/Plan     Active Hospital Problems    Diagnosis  POA    **COVID-19 virus detected [U07.1]  Yes    Acute on chronic respiratory failure with hypoxia and hypercapnia [J96.21, J96.22]  Yes    Hypertension [I10]  Yes    Ischemic cardiomyopathy [I25.5]  Yes    Paroxysmal atrial fibrillation [I48.0]  Yes    COPD exacerbation [J44.1]  Yes      Resolved Hospital Problems   No resolved problems to display.           Acute on chronic hypoxemic respiratory failure secondary to COPD exacerbation and COVID-19 respiratory infection.  Chest x-ray without infiltrate.  Respiratory PCR is positive for COVID-19.  Blood cultures are  negative.  Normal procalcitonin.  CRP is elevated (improving)..  Patient patient with slow improvement on remdesivir/p.o. Zithromax/Symbicort/Decadron/Mucinex/DuoNebs/NIPPV.  Noted and appreciate pulmonary consult.  Hyperglycemia.  Mostly steroid-induced.  Normal blood sugar yesterday.  Hypertension/coronary artery disease/ischemic cardiomyopathy and chronic systolic congestive heart failure/paroxysmal A-fib/intracardiac thrombus.  Patient last echo on 8/11/2023 revealing apical left ventricular thrombus, dilated left ventricle, anteroseptum akinesis consistent with previous infarction, ejection fraction of 36 to 40%, grade 1 diastolic dysfunction.   good blood pressure control.  No evidence of angina or congestive heart failure.  Continue Eliquis/Lipitor/aspirin/Lopressor  Iron deficiency anemia.  Anemia workup noted.  Hemoglobin has stabilized around 12.  GI workup as an outpatient   VTE prophylaxis.  Patient anticoagulated.        Discussed my findings and plan of treatment with the patient.  Disposition.  To be determined based on clinical course.      Maryanne Dover MD  [facility]  04/14/25  08:29 EDT

## 2025-04-15 LAB
ALBUMIN SERPL-MCNC: 2.8 G/DL (ref 3.5–5.2)
ALBUMIN/GLOB SERPL: 1.1 G/DL
ALP SERPL-CCNC: 131 U/L (ref 39–117)
ALT SERPL W P-5'-P-CCNC: 46 U/L (ref 1–41)
ANION GAP SERPL CALCULATED.3IONS-SCNC: 7.9 MMOL/L (ref 5–15)
AST SERPL-CCNC: 34 U/L (ref 1–40)
BACTERIA SPEC AEROBE CULT: NORMAL
BACTERIA SPEC AEROBE CULT: NORMAL
BASOPHILS # BLD AUTO: 0.02 10*3/MM3 (ref 0–0.2)
BASOPHILS NFR BLD AUTO: 0.2 % (ref 0–1.5)
BILIRUB SERPL-MCNC: 0.2 MG/DL (ref 0–1.2)
BUN SERPL-MCNC: 25 MG/DL (ref 8–23)
BUN/CREAT SERPL: 30.1 (ref 7–25)
CALCIUM SPEC-SCNC: 7.8 MG/DL (ref 8.6–10.5)
CHLORIDE SERPL-SCNC: 103 MMOL/L (ref 98–107)
CO2 SERPL-SCNC: 24.1 MMOL/L (ref 22–29)
CREAT SERPL-MCNC: 0.83 MG/DL (ref 0.76–1.27)
CRP SERPL-MCNC: 0.33 MG/DL (ref 0–0.5)
D DIMER PPP FEU-MCNC: 1.25 MCGFEU/ML (ref 0–0.8)
DEPRECATED RDW RBC AUTO: 39.9 FL (ref 37–54)
EGFRCR SERPLBLD CKD-EPI 2021: 88.5 ML/MIN/1.73
EOSINOPHIL # BLD AUTO: 0 10*3/MM3 (ref 0–0.4)
EOSINOPHIL NFR BLD AUTO: 0 % (ref 0.3–6.2)
ERYTHROCYTE [DISTWIDTH] IN BLOOD BY AUTOMATED COUNT: 12.5 % (ref 12.3–15.4)
GLOBULIN UR ELPH-MCNC: 2.5 GM/DL
GLUCOSE SERPL-MCNC: 135 MG/DL (ref 65–99)
HCT VFR BLD AUTO: 38.7 % (ref 37.5–51)
HGB BLD-MCNC: 12.6 G/DL (ref 13–17.7)
IMM GRANULOCYTES # BLD AUTO: 0.11 10*3/MM3 (ref 0–0.05)
IMM GRANULOCYTES NFR BLD AUTO: 0.9 % (ref 0–0.5)
LYMPHOCYTES # BLD AUTO: 0.49 10*3/MM3 (ref 0.7–3.1)
LYMPHOCYTES NFR BLD AUTO: 3.8 % (ref 19.6–45.3)
MCH RBC QN AUTO: 28.8 PG (ref 26.6–33)
MCHC RBC AUTO-ENTMCNC: 32.6 G/DL (ref 31.5–35.7)
MCV RBC AUTO: 88.4 FL (ref 79–97)
MONOCYTES # BLD AUTO: 0.34 10*3/MM3 (ref 0.1–0.9)
MONOCYTES NFR BLD AUTO: 2.6 % (ref 5–12)
NEUTROPHILS NFR BLD AUTO: 11.93 10*3/MM3 (ref 1.7–7)
NEUTROPHILS NFR BLD AUTO: 92.5 % (ref 42.7–76)
NRBC BLD AUTO-RTO: 0 /100 WBC (ref 0–0.2)
PLATELET # BLD AUTO: 220 10*3/MM3 (ref 140–450)
PMV BLD AUTO: 9.2 FL (ref 6–12)
POTASSIUM SERPL-SCNC: 4.6 MMOL/L (ref 3.5–5.2)
PROCALCITONIN SERPL-MCNC: 0.05 NG/ML (ref 0–0.25)
PROT SERPL-MCNC: 5.3 G/DL (ref 6–8.5)
RBC # BLD AUTO: 4.38 10*6/MM3 (ref 4.14–5.8)
SODIUM SERPL-SCNC: 135 MMOL/L (ref 136–145)
WBC NRBC COR # BLD AUTO: 12.89 10*3/MM3 (ref 3.4–10.8)

## 2025-04-15 PROCEDURE — 36415 COLL VENOUS BLD VENIPUNCTURE: CPT | Performed by: NURSE PRACTITIONER

## 2025-04-15 PROCEDURE — 85379 FIBRIN DEGRADATION QUANT: CPT | Performed by: NURSE PRACTITIONER

## 2025-04-15 PROCEDURE — 94761 N-INVAS EAR/PLS OXIMETRY MLT: CPT

## 2025-04-15 PROCEDURE — 86140 C-REACTIVE PROTEIN: CPT | Performed by: INTERNAL MEDICINE

## 2025-04-15 PROCEDURE — 94799 UNLISTED PULMONARY SVC/PX: CPT

## 2025-04-15 PROCEDURE — 85025 COMPLETE CBC W/AUTO DIFF WBC: CPT | Performed by: INTERNAL MEDICINE

## 2025-04-15 PROCEDURE — 63710000001 DEXAMETHASONE PER 0.25 MG: Performed by: INTERNAL MEDICINE

## 2025-04-15 PROCEDURE — 84145 PROCALCITONIN (PCT): CPT | Performed by: NURSE PRACTITIONER

## 2025-04-15 PROCEDURE — 94660 CPAP INITIATION&MGMT: CPT

## 2025-04-15 PROCEDURE — 80053 COMPREHEN METABOLIC PANEL: CPT | Performed by: INTERNAL MEDICINE

## 2025-04-15 RX ADMIN — IPRATROPIUM BROMIDE AND ALBUTEROL SULFATE 3 ML: .5; 3 SOLUTION RESPIRATORY (INHALATION) at 07:46

## 2025-04-15 RX ADMIN — BUDESONIDE AND FORMOTEROL FUMARATE DIHYDRATE 2 PUFF: 160; 4.5 AEROSOL RESPIRATORY (INHALATION) at 19:12

## 2025-04-15 RX ADMIN — BUDESONIDE AND FORMOTEROL FUMARATE DIHYDRATE 2 PUFF: 160; 4.5 AEROSOL RESPIRATORY (INHALATION) at 07:47

## 2025-04-15 RX ADMIN — DEXAMETHASONE 6 MG: 2 TABLET ORAL at 22:17

## 2025-04-15 RX ADMIN — IPRATROPIUM BROMIDE AND ALBUTEROL SULFATE 3 ML: .5; 3 SOLUTION RESPIRATORY (INHALATION) at 15:36

## 2025-04-15 RX ADMIN — IPRATROPIUM BROMIDE AND ALBUTEROL SULFATE 3 ML: .5; 3 SOLUTION RESPIRATORY (INHALATION) at 04:38

## 2025-04-15 RX ADMIN — DEXAMETHASONE 6 MG: 2 TABLET ORAL at 02:33

## 2025-04-15 RX ADMIN — DEXAMETHASONE 6 MG: 2 TABLET ORAL at 08:21

## 2025-04-15 RX ADMIN — GUAIFENESIN 600 MG: 600 TABLET, EXTENDED RELEASE ORAL at 22:17

## 2025-04-15 RX ADMIN — METOPROLOL TARTRATE 25 MG: 25 TABLET, FILM COATED ORAL at 08:20

## 2025-04-15 RX ADMIN — APIXABAN 5 MG: 5 TABLET, FILM COATED ORAL at 08:20

## 2025-04-15 RX ADMIN — IPRATROPIUM BROMIDE AND ALBUTEROL SULFATE 3 ML: .5; 3 SOLUTION RESPIRATORY (INHALATION) at 19:12

## 2025-04-15 RX ADMIN — METOPROLOL TARTRATE 25 MG: 25 TABLET, FILM COATED ORAL at 22:17

## 2025-04-15 RX ADMIN — ASPIRIN 81 MG: 81 TABLET, COATED ORAL at 08:20

## 2025-04-15 RX ADMIN — IPRATROPIUM BROMIDE AND ALBUTEROL SULFATE 3 ML: .5; 3 SOLUTION RESPIRATORY (INHALATION) at 00:26

## 2025-04-15 RX ADMIN — GUAIFENESIN 600 MG: 600 TABLET, EXTENDED RELEASE ORAL at 08:19

## 2025-04-15 RX ADMIN — IPRATROPIUM BROMIDE AND ALBUTEROL SULFATE 3 ML: .5; 3 SOLUTION RESPIRATORY (INHALATION) at 23:26

## 2025-04-15 RX ADMIN — ATORVASTATIN CALCIUM 80 MG: 80 TABLET, FILM COATED ORAL at 08:19

## 2025-04-15 RX ADMIN — IPRATROPIUM BROMIDE AND ALBUTEROL SULFATE 3 ML: .5; 3 SOLUTION RESPIRATORY (INHALATION) at 11:31

## 2025-04-15 RX ADMIN — FAMOTIDINE 20 MG: 20 TABLET, FILM COATED ORAL at 08:20

## 2025-04-15 RX ADMIN — FAMOTIDINE 20 MG: 20 TABLET, FILM COATED ORAL at 22:17

## 2025-04-15 RX ADMIN — DEXAMETHASONE 6 MG: 2 TABLET ORAL at 13:51

## 2025-04-15 RX ADMIN — Medication 1000 MCG: at 08:20

## 2025-04-15 RX ADMIN — AZITHROMYCIN 250 MG: 250 TABLET, FILM COATED ORAL at 08:20

## 2025-04-15 RX ADMIN — APIXABAN 5 MG: 5 TABLET, FILM COATED ORAL at 22:17

## 2025-04-15 NOTE — PROGRESS NOTES
[facility]  [phone]  Dr. Tolu Aldana    Subjective:  LOS: 5    Chief Complaint: Respiratory failure    On BIPAP. Able to take it off and tolerates but I have not seen him without.    Objective   Vital Signs past 24hrs  Temp range: Temp (24hrs), Av.8 °F (36.6 °C), Min:97.5 °F (36.4 °C), Max:98.4 °F (36.9 °C)    BP range: BP: (103-124)/(51-61) 111/61  Pulse range: Heart Rate:  [65-80] 73  Resp rate range: Resp:  [18-22] 21  Device (Oxygen Therapy): NPPV/NIV   Oxygen range:SpO2:  [94 %-98 %] 98 %   Mechanical Ventilator:     Physical Exam  Eyes:      Pupils: Pupils are equal, round, and reactive to light.   Cardiovascular:      Rate and Rhythm: Normal rate and regular rhythm.      Heart sounds: No murmur heard.  Pulmonary:      Effort: Accessory muscle usage present.      Breath sounds: Decreased breath sounds and wheezing present.   Abdominal:      General: Bowel sounds are normal.      Palpations: Abdomen is soft. There is no mass.      Tenderness: There is no abdominal tenderness.   Musculoskeletal:         General: No swelling.   Neurological:      Mental Status: He is alert.       Results Review:    I have reviewed the laboratory and imaging data since the last note by [facility] physician.  My annotations are noted in assessment and plan.      Result Review:  I have personally reviewed the results from last note by [facility] physician to 4/15/2025 15:50 EDT and agree with these findings:  [x]  Laboratory list / accordion  [x]  Microbiology  [x]  Radiology  []  EKG/Telemetry   []  Cardiology/Vascular   []  Pathology  []  Old records  []  Other:      Medication Review:  I have reviewed the current MAR.  My annotations are noted in assessment and plan.    apixaban, 5 mg, Oral, Q12H  aspirin, 81 mg, Oral, Daily  atorvastatin, 80 mg, Oral, Daily  budesonide-formoterol, 2 puff, Inhalation, BID - RT  dexAMETHasone, 6 mg, Oral, Q6H   Or  dexAMETHasone, 6 mg, Intravenous, Q6H  famotidine, 20 mg, Oral,  BID  guaiFENesin, 600 mg, Oral, Q12H  ipratropium-albuterol, 3 mL, Nebulization, Q4H - RT  metoprolol tartrate, 25 mg, Oral, BID  polyethylene glycol, 17 g, Oral, Daily  vitamin B-12, 1,000 mcg, Oral, Daily             Lines, Drains & Airways       Active LDAs       Name Placement date Placement time Site Days    Peripheral IV 04/11/25 1135 Anterior;Right Forearm 04/11/25  1135  Forearm  1                    PCCM Problems  Acute on chronic hypercapnic respiratory failure  Acute hypoxic respiratory failure  Home BIPAP  COVID-19 infection  COPD with acute exacerbation  Paroxysmal atrial fibrillation  Coronary artery disease  Ischemic cardiomyopathy  Hyperlipidemia  Hypertension  [facility]          Plan of Treatment    Currently using noninvasive ventilation and with benefit.  Appears to tolerate nasal cannula also.  He prefers to remain on noninvasive ventilation.    Patient was asked to bring in his home BiPAP machine to see if it is set appropriately for his chronic respiratory failure. He does not know the settings. [facility].    Wheezing caryl. Now mild coarse. Change to bid tomorrow.    Currently on treatment for COVID-19 with remdesivir.  Also on dexamethasone as outlined.    No pulmonary infiltrates.  No need for antibiotics at this time.  Patient is on azithromycin for COPD management.    [facility]        Tolu Aldana MD  04/15/25  15:50 EDT    Isolation status: Enhanced Droplet/Contact     Dietary Orders (From admission, onward)       Start     Ordered    04/11/25 0937  Dietary Nutrition Supplements Other (see comment); Boost Original  Daily With Breakfast & Dinner      Question Answer Comment   Select Supplement: Other (see comment)    Other Boost Original        04/11/25 0936    04/11/25 0100  Diet: Cardiac; Healthy Heart (2-3 Na+); Fluid Consistency: Thin (IDDSI 0)  Diet Effective Now        References:    Diet Order Definitions    Question Answer Comment   Diets: Cardiac    Cardiac Diet: Healthy Heart (2-3 Na+)    Fluid Consistency: Thin (IDDSI 0)        04/11/25 0059                    Part of this note may be an electronic transcription/translation of spoken language to printed text using the Dragon Dictation System.

## 2025-04-15 NOTE — PROGRESS NOTES
Nutrition Services    Patient Name:  Wisam Malone  YOB: 1945  MRN: [id number]  Admit Date:  4/10/2025    PROGRESS NOTE      Encounter Information: Follow Up       PO Diet: Diet: Cardiac; Healthy Heart (2-3 Na+); Fluid Consistency: Thin (IDDSI 0)   PO Supplements: Boost Plus, BID   PO Intake:  %       Current nutrition support: -   Nutrition support review: -       Weight: Weight: 65 kg (143 lb 4.8 oz) (04/10/25 2965)       Medications: reviewed   Labs: reviewed        GI Function:  last bowel movement: 4/14       Nutrition Intervention Updates: Eating well.  Boost in place BID.    RD to continue to follow.     Results from last 7 days   Lab Units 04/15/25  0655 04/14/25  0452 04/13/25  0454   SODIUM mmol/L 135* 137 136   POTASSIUM mmol/L 4.6 4.6 4.5   CHLORIDE mmol/L 103 101 102   CO2 mmol/L 24.1 24.8 26.0   BUN mg/dL 25* 27* 29*   CREATININE mg/dL 0.83 0.84 0.86   CALCIUM mg/dL 7.8* 8.2* 8.0*   BILIRUBIN mg/dL 0.2 0.2 <0.2   ALK PHOS U/L 131* 142* 155*   ALT (SGPT) U/L 46* 27 25   AST (SGOT) U/L 34 20 18   GLUCOSE mg/dL 135* 129* 155*     Results from last 7 days   Lab Units 04/15/25  0655 04/12/25  0437 04/11/25  0528 04/10/25  1558   MAGNESIUM mg/dL  --   --  2.1 2.3   PHOSPHORUS mg/dL  --   --  3.1 4.0   HEMOGLOBIN g/dL 12.6*   < > 12.1* 13.7   HEMATOCRIT % 38.7   < > 37.1* 43.4    < > = values in this interval not displayed.     COVID19   Date Value Ref Range Status   04/10/2025 Detected (C) Not Detected - Ref. Range Final     Lab Results   Component Value Date    HGBA1C 5.90 (H) 04/04/2024       RD to follow up per protocol.    Electronically signed by:  Ella Oliveros RD  04/15/25 13:34 EDT

## 2025-04-15 NOTE — PROGRESS NOTES
Name: Wisam Malone ADMIT: 4/10/2025   : 1945  PCP: Billie Ponce MD    MRN: [id number] LOS: 5 days   AGE/SEX: 79 y.o. male  ROOM: [facility]     Subjective   Subjective   Shortness of breath continues to improve.  Decreased dry cough..  No chest pain.  No hemoptysis.  No palpitation.  No ankle edema.  No fever or chills    Review of Systems  GI.  No abdominal pain.  No nausea or vomiting.  .  No dysuria or hematuria.     Objective   Objective   Vital Signs  Temp:  [97.5 °F (36.4 °C)-98.4 °F (36.9 °C)] 97.5 °F (36.4 °C)  Heart Rate:  [65-80] 75  Resp:  [18-26] 21  BP: (103-124)/(51-57) 118/57  SpO2:  [94 %-100 %] 94 %  on   ;   Device (Oxygen Therapy): NPPV/NIV    Intake/Output Summary (Last 24 hours) at 4/15/2025 1041  Last data filed at 4/15/2025 0318  Gross per 24 hour   Intake 600 ml   Output 1450 ml   Net -850 ml     Body mass index is 18.91 kg/m².      04/10/25  2315   Weight: 65 kg (143 lb 4.8 oz)     Physical Exam  General.  Elderly gentleman.  He is alert and oriented x 4.  In no apparent pain/distress/diaphoresis.  Normal mood and affect.  On BiPAP.  Eyes.  Pupils equal round and reactive.  Intact extraocular musculature.  No pallor or jaundice.  Oral cavity.  Moist mucous membrane  Neck.  Supple.  No JVD.  No lymphadenopathy or thyromegaly.  Cardiovascular.  Regular rate and rhythm with no gallops or murmurs  Chest.  Poor bilateral air entry with scattered bilateral expiratory wheezes and harsh bronchial breathing bilaterally more so on the right than the left.  Abdomen.  Soft lax.  No tenderness.  No organomegaly.  No guarding or rebound  Extremities.  No clubbing/cyanosis/edema  CNS.  No acute focal neurological deficits.    Results Review:      Results from last 7 days   Lab Units 04/15/25  0655 25  0452 25  0454 25  0437 25  0528 04/10/25  1558   SODIUM mmol/L 135* 137 136 142 141 139   POTASSIUM mmol/L 4.6 4.6 4.5 4.2 5.2 4.3   CHLORIDE mmol/L 103 101 102 105  "104 100   CO2 mmol/L 24.1 24.8 26.0 29.9* 29.6* 30.8*   BUN mg/dL 25* 27* 29* 30* 25* 20   CREATININE mg/dL 0.83 0.84 0.86 1.06 1.05 1.06   GLUCOSE mg/dL 135* 129* 155* 78 173* 131*   CALCIUM mg/dL 7.8* 8.2* 8.0* 8.4* 8.7 9.0   AST (SGOT) U/L 34 20 18 20 17 25   ALT (SGPT) U/L 46* 27 25 25 24 29     Estimated Creatinine Clearance: 65.3 mL/min (by C-G formula based on SCr of 0.83 mg/dL).          Results from last 7 days   Lab Units 04/10/25  1711 04/10/25  1558   HSTROP T ng/L 32* 32*     Results from last 7 days   Lab Units 04/10/25  1558   PROBNP pg/mL 1,824.0*         Results from last 7 days   Lab Units 04/11/25  0528 04/10/25  1558   MAGNESIUM mg/dL 2.1 2.3   PHOSPHORUS mg/dL 3.1 4.0           Invalid input(s): \"LDLCALC\"  Results from last 7 days   Lab Units 04/15/25  0655 04/14/25  0452 04/13/25  0454 04/12/25  0437 04/11/25  0528 04/10/25  1558   WBC 10*3/mm3 12.89* 10.99* 5.03 7.23 4.45 12.91*   HEMOGLOBIN g/dL 12.6* 12.3* 11.7* 12.2* 12.1* 13.7   HEMATOCRIT % 38.7 38.5 36.7* 36.8* 37.1* 43.4   PLATELETS 10*3/mm3 220 200 184 176 178 257   MCV fL 88.4 88.3 90.2 88.7 90.5 90.6   MCH pg 28.8 28.2 28.7 29.4 29.5 28.6   MCHC g/dL 32.6 31.9 31.9 33.2 32.6 31.6   RDW % 12.5 12.3 12.1* 12.2* 12.0* 12.1*   RDW-SD fl 39.9 39.5 39.6 39.5 39.5 40.4   MPV fL 9.2 9.3 9.5 9.2 9.3 9.0   NEUTROPHIL % % 92.5* 93.6* 90.8* 72.2 86.1* 77.0*   LYMPHOCYTE % % 3.8* 4.5* 7.6* 16.7* 6.7* 12.2*   MONOCYTES % % 2.6* 1.3* 0.8* 9.4 6.3 9.2   EOSINOPHIL % % 0.0* 0.0* 0.0* 0.6 0.0* 0.6   BASOPHIL % % 0.2 0.1 0.0 0.4 0.0 0.5   IMM GRAN % % 0.9* 0.5 0.8* 0.7* 0.9* 0.5   NEUTROS ABS 10*3/mm3 11.93* 10.28* 4.57 5.22 3.83 9.92*   LYMPHS ABS 10*3/mm3 0.49* 0.50* 0.38* 1.21 0.30* 1.58   MONOS ABS 10*3/mm3 0.34 0.14 0.04* 0.68 0.28 1.19*   EOS ABS 10*3/mm3 0.00 0.00 0.00 0.04 0.00 0.08   BASOS ABS 10*3/mm3 0.02 0.01 0.00 0.03 0.00 0.07   IMMATURE GRANS (ABS) 10*3/mm3 0.11* 0.06* 0.04 0.05 0.04 0.07*   NRBC /100 WBC 0.0 0.0 0.0 0.0 0.0 0.0 "     Results from last 7 days   Lab Units 04/10/25  1558   INR  1.35*     Results from last 7 days   Lab Units 04/10/25  1756 04/10/25  1600   PH, ARTERIAL pH units 7.379 7.305*   PO2 ART mm Hg 76.5* 248.8*   PCO2, ARTERIAL mm Hg 51.1* 67.0*   HCO3 ART mmol/L 30.1* 33.4*     Results from last 7 days   Lab Units 04/15/25  0655 04/13/25  0454 04/10/25  1711 04/10/25  1558   PROCALCITONIN ng/mL 0.05 0.05 0.06 0.07   LACTATE mmol/L  --   --   --  1.4     Results from last 7 days   Lab Units 04/15/25  0655 04/14/25  0452 04/13/25  0454 04/12/25  0437 04/11/25  0528   CRP mg/dL 0.33 0.69* 1.18* 0.98* 1.72*         Results from last 7 days   Lab Units 04/10/25  1711 04/10/25  1645   BLOODCX  No growth at 4 days No growth at 4 days     Results from last 7 days   Lab Units 04/10/25  1559   ADENOVIRUS DETECTION BY PCR  Not Detected   CORONAVIRUS 229E  Not Detected   CORONAVIRUS HKU1  Not Detected   CORONAVIRUS NL63  Not Detected   CORONAVIRUS OC43  Not Detected   HUMAN METAPNEUMOVIRUS  Not Detected   HUMAN RHINOVIRUS/ENTEROVIRUS  Not Detected   INFLUENZA B PCR  Not Detected   PARAINFLUENZA 1  Not Detected   PARAINFLUENZA VIRUS 2  Not Detected   PARAINFLUENZA VIRUS 3  Not Detected   PARAINFLUENZA VIRUS 4  Not Detected   BORDETELLA PERTUSSIS PCR  Not Detected   CHLAMYDOPHILA PNEUMONIAE PCR  Not Detected   MYCOPLAMA PNEUMO PCR  Not Detected   INFLUENZA A PCR  Not Detected   RSV, PCR  Not Detected               Imaging:  Imaging Results (Last 24 Hours)       ** No results found for the last 24 hours. **               I reviewed the patient's new clinical results / labs / tests / procedures      Assessment/Plan     Active Hospital Problems    Diagnosis  POA    **COVID-19 virus detected [U07.1]  Yes    Acute on chronic respiratory failure with hypoxia and hypercapnia [J96.21, J96.22]  Yes    Hypertension [I10]  Yes    Ischemic cardiomyopathy [I25.5]  Yes    Paroxysmal atrial fibrillation [I48.0]  Yes    COPD exacerbation [J44.1]   Yes      Resolved Hospital Problems   No resolved problems to display.           Acute on chronic hypoxemic respiratory failure secondary to COPD exacerbation and COVID-19 respiratory infection.  Chest x-ray without infiltrate.  Respiratory PCR is positive for COVID-19.  Blood cultures are negative.  Normal procalcitonin.  CRP was elevated and now has normalized.  Status post remdesivir and Zithromax treatment and currently on Decadron/Mucinex/DuoNebs/BiPAP...  Noted and appreciate pulmonary consult.  Hyperglycemia.  Mostly steroid-induced.  Normal blood sugar yesterday.  Hypertension/coronary artery disease/ischemic cardiomyopathy and chronic systolic congestive heart failure/paroxysmal A-fib/intracardiac thrombus.  Patient last echo on 8/11/2023 revealing apical left ventricular thrombus, dilated left ventricle, anteroseptum akinesis consistent with previous infarction, ejection fraction of 36 to 40%, grade 1 diastolic dysfunction.   good blood pressure control.  No evidence of angina or congestive heart failure.  Continue Eliquis/Lipitor/aspirin/Lopressor  Iron deficiency anemia.  Anemia workup noted.  Hemoglobin has stabilized around 12.  GI workup as an outpatient   VTE prophylaxis.  Patient anticoagulated.        Discussed my findings and plan of treatment with the patient.  Disposition.  To be determined based on clinical course.      Maryanne Dover MD  [facility]  04/15/25  10:41 EDT

## 2025-04-15 NOTE — CASE MANAGEMENT/SOCIAL WORK
Continued Stay Note  [facility]     Patient Name: Wisam Malone  MRN: [id number]  Today's Date: 4/15/2025    Admit Date: 4/10/2025    Plan: Home with hospice   Discharge Plan       Row Name 04/15/25 1612       Plan    Plan Home with hospice    Patient/Family in Agreement with Plan yes    Plan Comments CCP reviewed chart, discussed during MDR, pt recovering very slowly, remdesivir dosing completed. Remains on bipap continuously for pt comfort. Tolerating diet and minimal activity. Plan remains dc home with hospice, await dc readiness CCP will follow - Brinda WASHINGTON                   Discharge Codes    No documentation.                 Expected Discharge Date and Time       Expected Discharge Date Expected Discharge Time    Apr 17, 2025               Brinda Fernandes RN

## 2025-04-16 ENCOUNTER — READMISSION MANAGEMENT (OUTPATIENT)
Dept: CALL CENTER | Facility: HOSPITAL | Age: 80
End: 2025-04-16
Payer: MEDICARE

## 2025-04-16 VITALS
BODY MASS INDEX: 18.99 KG/M2 | HEART RATE: 75 BPM | DIASTOLIC BLOOD PRESSURE: 71 MMHG | SYSTOLIC BLOOD PRESSURE: 128 MMHG | OXYGEN SATURATION: 90 % | HEIGHT: 73 IN | TEMPERATURE: 97.7 F | RESPIRATION RATE: 23 BRPM | WEIGHT: 143.3 LBS

## 2025-04-16 PROBLEM — D50.9 IRON DEFICIENCY ANEMIA: Status: ACTIVE | Noted: 2025-04-16

## 2025-04-16 PROBLEM — D89.832 CYTOKINE RELEASE SYNDROME, GRADE 2: Status: ACTIVE | Noted: 2025-04-16

## 2025-04-16 LAB
ALBUMIN SERPL-MCNC: 2.4 G/DL (ref 3.5–5.2)
ALBUMIN/GLOB SERPL: 0.9 G/DL
ALP SERPL-CCNC: 139 U/L (ref 39–117)
ALT SERPL W P-5'-P-CCNC: 77 U/L (ref 1–41)
ANION GAP SERPL CALCULATED.3IONS-SCNC: 7.8 MMOL/L (ref 5–15)
AST SERPL-CCNC: 43 U/L (ref 1–40)
BASOPHILS # BLD AUTO: 0.01 10*3/MM3 (ref 0–0.2)
BASOPHILS NFR BLD AUTO: 0.1 % (ref 0–1.5)
BILIRUB SERPL-MCNC: <0.2 MG/DL (ref 0–1.2)
BUN SERPL-MCNC: 28 MG/DL (ref 8–23)
BUN/CREAT SERPL: 33.7 (ref 7–25)
CALCIUM SPEC-SCNC: 7.8 MG/DL (ref 8.6–10.5)
CHLORIDE SERPL-SCNC: 104 MMOL/L (ref 98–107)
CO2 SERPL-SCNC: 24.2 MMOL/L (ref 22–29)
CREAT SERPL-MCNC: 0.83 MG/DL (ref 0.76–1.27)
CRP SERPL-MCNC: <0.3 MG/DL (ref 0–0.5)
DEPRECATED RDW RBC AUTO: 41.3 FL (ref 37–54)
EGFRCR SERPLBLD CKD-EPI 2021: 88.5 ML/MIN/1.73
EOSINOPHIL # BLD AUTO: 0 10*3/MM3 (ref 0–0.4)
EOSINOPHIL NFR BLD AUTO: 0 % (ref 0.3–6.2)
ERYTHROCYTE [DISTWIDTH] IN BLOOD BY AUTOMATED COUNT: 12.6 % (ref 12.3–15.4)
GLOBULIN UR ELPH-MCNC: 2.6 GM/DL
GLUCOSE SERPL-MCNC: 136 MG/DL (ref 65–99)
HCT VFR BLD AUTO: 36.7 % (ref 37.5–51)
HGB BLD-MCNC: 12 G/DL (ref 13–17.7)
IMM GRANULOCYTES # BLD AUTO: 0.18 10*3/MM3 (ref 0–0.05)
IMM GRANULOCYTES NFR BLD AUTO: 1.3 % (ref 0–0.5)
LYMPHOCYTES # BLD AUTO: 0.48 10*3/MM3 (ref 0.7–3.1)
LYMPHOCYTES NFR BLD AUTO: 3.5 % (ref 19.6–45.3)
MCH RBC QN AUTO: 29.1 PG (ref 26.6–33)
MCHC RBC AUTO-ENTMCNC: 32.7 G/DL (ref 31.5–35.7)
MCV RBC AUTO: 89.1 FL (ref 79–97)
MONOCYTES # BLD AUTO: 0.47 10*3/MM3 (ref 0.1–0.9)
MONOCYTES NFR BLD AUTO: 3.4 % (ref 5–12)
NEUTROPHILS NFR BLD AUTO: 12.66 10*3/MM3 (ref 1.7–7)
NEUTROPHILS NFR BLD AUTO: 91.7 % (ref 42.7–76)
NRBC BLD AUTO-RTO: 0 /100 WBC (ref 0–0.2)
PLATELET # BLD AUTO: 195 10*3/MM3 (ref 140–450)
PMV BLD AUTO: 9.1 FL (ref 6–12)
POTASSIUM SERPL-SCNC: 4.7 MMOL/L (ref 3.5–5.2)
PROT SERPL-MCNC: 5 G/DL (ref 6–8.5)
RBC # BLD AUTO: 4.12 10*6/MM3 (ref 4.14–5.8)
SODIUM SERPL-SCNC: 136 MMOL/L (ref 136–145)
WBC NRBC COR # BLD AUTO: 13.8 10*3/MM3 (ref 3.4–10.8)

## 2025-04-16 PROCEDURE — 94761 N-INVAS EAR/PLS OXIMETRY MLT: CPT

## 2025-04-16 PROCEDURE — 94660 CPAP INITIATION&MGMT: CPT

## 2025-04-16 PROCEDURE — 94664 DEMO&/EVAL PT USE INHALER: CPT

## 2025-04-16 PROCEDURE — 94799 UNLISTED PULMONARY SVC/PX: CPT

## 2025-04-16 PROCEDURE — 63710000001 DEXAMETHASONE PER 0.25 MG: Performed by: INTERNAL MEDICINE

## 2025-04-16 PROCEDURE — 80053 COMPREHEN METABOLIC PANEL: CPT | Performed by: INTERNAL MEDICINE

## 2025-04-16 PROCEDURE — 86140 C-REACTIVE PROTEIN: CPT | Performed by: INTERNAL MEDICINE

## 2025-04-16 PROCEDURE — 85025 COMPLETE CBC W/AUTO DIFF WBC: CPT | Performed by: INTERNAL MEDICINE

## 2025-04-16 RX ORDER — DEXAMETHASONE 6 MG/1
6 TABLET ORAL
Qty: 6 TABLET | Refills: 0 | Status: SHIPPED | OUTPATIENT
Start: 2025-04-16 | End: 2025-04-22

## 2025-04-16 RX ADMIN — FAMOTIDINE 20 MG: 20 TABLET, FILM COATED ORAL at 08:37

## 2025-04-16 RX ADMIN — IPRATROPIUM BROMIDE AND ALBUTEROL SULFATE 3 ML: .5; 3 SOLUTION RESPIRATORY (INHALATION) at 15:06

## 2025-04-16 RX ADMIN — DEXAMETHASONE 6 MG: 2 TABLET ORAL at 02:40

## 2025-04-16 RX ADMIN — METOPROLOL TARTRATE 25 MG: 25 TABLET, FILM COATED ORAL at 08:38

## 2025-04-16 RX ADMIN — BUDESONIDE AND FORMOTEROL FUMARATE DIHYDRATE 2 PUFF: 160; 4.5 AEROSOL RESPIRATORY (INHALATION) at 07:59

## 2025-04-16 RX ADMIN — APIXABAN 5 MG: 5 TABLET, FILM COATED ORAL at 08:37

## 2025-04-16 RX ADMIN — IPRATROPIUM BROMIDE AND ALBUTEROL SULFATE 3 ML: .5; 3 SOLUTION RESPIRATORY (INHALATION) at 07:56

## 2025-04-16 RX ADMIN — IPRATROPIUM BROMIDE AND ALBUTEROL SULFATE 3 ML: .5; 3 SOLUTION RESPIRATORY (INHALATION) at 03:25

## 2025-04-16 RX ADMIN — ASPIRIN 81 MG: 81 TABLET, COATED ORAL at 08:37

## 2025-04-16 RX ADMIN — IPRATROPIUM BROMIDE AND ALBUTEROL SULFATE 3 ML: .5; 3 SOLUTION RESPIRATORY (INHALATION) at 11:10

## 2025-04-16 RX ADMIN — Medication 1000 MCG: at 08:38

## 2025-04-16 RX ADMIN — DEXAMETHASONE 6 MG: 2 TABLET ORAL at 08:37

## 2025-04-16 RX ADMIN — ATORVASTATIN CALCIUM 80 MG: 80 TABLET, FILM COATED ORAL at 08:38

## 2025-04-16 RX ADMIN — GUAIFENESIN 600 MG: 600 TABLET, EXTENDED RELEASE ORAL at 08:37

## 2025-04-16 NOTE — PLAN OF CARE
Problem: Adult Inpatient Plan of Care  Goal: Absence of Hospital-Acquired Illness or Injury  Outcome: Progressing  Intervention: Identify and Manage Fall Risk  Recent Flowsheet Documentation  Taken 4/16/2025 0400 by Dave Turner RN  Safety Promotion/Fall Prevention:   safety round/check completed   room organization consistent   nonskid shoes/slippers when out of bed   lighting adjusted   fall prevention program maintained   clutter free environment maintained   assistive device/personal items within reach  Taken 4/16/2025 0200 by Dave Turner RN  Safety Promotion/Fall Prevention:   safety round/check completed   room organization consistent   nonskid shoes/slippers when out of bed   lighting adjusted   fall prevention program maintained   clutter free environment maintained   assistive device/personal items within reach  Taken 4/15/2025 2200 by Dave Turner RN  Safety Promotion/Fall Prevention:   safety round/check completed   room organization consistent   nonskid shoes/slippers when out of bed   lighting adjusted   fall prevention program maintained   clutter free environment maintained   assistive device/personal items within reach  Taken 4/15/2025 2040 by Dave Turner RN  Safety Promotion/Fall Prevention:   safety round/check completed   room organization consistent   nonskid shoes/slippers when out of bed   lighting adjusted   fall prevention program maintained   clutter free environment maintained   assistive device/personal items within reach  Intervention: Prevent Skin Injury  Recent Flowsheet Documentation  Taken 4/16/2025 0400 by Dave Turner RN  Body Position: position changed independently  Taken 4/16/2025 0200 by Dave Turner RN  Body Position: position changed independently  Taken 4/15/2025 2200 by Dave Turner RN  Body Position: position changed independently  Taken 4/15/2025 2040 by Dave Turner  RN  Body Position: position changed independently  Skin Protection:   incontinence pads utilized   transparent dressing maintained  Intervention: Prevent and Manage VTE (Venous Thromboembolism) Risk  Recent Flowsheet Documentation  Taken 4/16/2025 0000 by Dave Turner RN  VTE Prevention/Management: (Eliquis, ASA)   bilateral   SCDs (sequential compression devices) off   patient refused intervention  Taken 4/15/2025 2040 by Dave Turner RN  VTE Prevention/Management: (Eliquis, ASA)   bilateral   SCDs (sequential compression devices) off   patient refused intervention  Intervention: Prevent Infection  Recent Flowsheet Documentation  Taken 4/16/2025 0400 by Dave Turner RN  Infection Prevention:   environmental surveillance performed   equipment surfaces disinfected   hand hygiene promoted   personal protective equipment utilized   rest/sleep promoted   single patient room provided  Taken 4/16/2025 0200 by Dave Turner RN  Infection Prevention:   environmental surveillance performed   equipment surfaces disinfected   hand hygiene promoted   personal protective equipment utilized   rest/sleep promoted   single patient room provided  Taken 4/15/2025 2200 by Dave Turner RN  Infection Prevention:   environmental surveillance performed   equipment surfaces disinfected   hand hygiene promoted   personal protective equipment utilized   rest/sleep promoted   single patient room provided  Taken 4/15/2025 2040 by Dave Turner RN  Infection Prevention:   environmental surveillance performed   equipment surfaces disinfected   hand hygiene promoted   rest/sleep promoted   personal protective equipment utilized   single patient room provided   Goal Outcome Evaluation:  Plan of Care Reviewed With: patient        Progress: no change

## 2025-04-16 NOTE — CASE MANAGEMENT/SOCIAL WORK
Case Management Discharge Note      Final Note: home with hospice via [facility]         Selected Continued Care - Discharged on 4/16/2025 Admission date: 4/10/2025 - Discharge disposition: Home or Self Care      Destination    No services have been selected for the patient.                Durable Medical Equipment    No services have been selected for the patient.                Dialysis/Infusion    No services have been selected for the patient.                Home Medical Care    No services have been selected for the patient.                Therapy    No services have been selected for the patient.                Community Resources    No services have been selected for the patient.                Community & DME    No services have been selected for the patient.                    Transportation Services  Ambulance: Other ([facility])  Other Ambulance Status: Accepted    Final Discharge Disposition Code: 50 - home with hospice

## 2025-04-16 NOTE — OUTREACH NOTE
Prep Survey      Flowsheet Row Responses   Faith facility patient discharged from? [place]   Is LACE score < 7 ? No   Eligibility Not Eligible   What are the reasons patient is not eligible? Hospice/Pallative Care   Does the patient have one of the following disease processes/diagnoses(primary or secondary)? Other   Prep survey completed? Yes            JUN URIBE - Registered Nurse

## 2025-04-16 NOTE — DISCHARGE SUMMARY
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        Hospital Course     Brief admission history and physical .  Please refer to the H&P for full details.  A pleasant 87 years old gentleman who is a hospice patient and has a chronic hypoxemic respiratory failure/COPD/hypertension/ischemic cardiomyopathy/atrial fibrillation who presents to the emergency department on account of worsening shortness of breath from the baseline associated with increasing cough.  Physical examination admission included an afebrile patient with stable vital signs except a blood pressure of 95/55.  In elderly frail chronically ill-appearing gentleman/mild respiratory distress/poor bilateral air entry with bilateral rhonchi.  Hospital course.  Initial ER evaluation included a CMP that was normal except a glucose of 131, CO2 of 30.8, alkaline phosphatase 157.  INR was elevated at 1.35.  proBNP elevated at 1824.  Troponin elevated at 32.  Lactic acid was normal.  Procalcitonin was normal.  Ethanol level was negative.  Magnesium and phosphorus were normal.  CBC normal except a white count of 12.9.  Respiratory PCR panel was positive for COVID.  Arterial blood gas revealed a pH of 7.30, PCO2 of 67, pO2 of 248 on AVAPS 14-30.  Chest x-ray revealed no acute infiltrate.  Patient  last 2D echo was on 11/2023 revealing small to moderate thrombus in the apex and ejection fraction of 36 to 40% with left atrial dilatation and akinetic anterior septum and apex.  EKG revealed sinus rhythm with multifocal ventricular complexes and left bundle branch block.  Hospital course will be summarized in a problem-oriented manner as below.  1.  Acute on chronic hypoxemic and hypercapnic respiratory failure secondary to COPD exacerbation and respiratory a COVID-19 infection.  Chest x-ray was without infiltrate.  Respiratory PCR panel was positive for COVID-19.  Blood cultures were negative during this admission with a normal procalcitonin.  Patient was placed on Decadron/Zithromax/remdesivir/Mucinex/DuoNebs and BiPAP continuation.  Pulmonary consult was obtained.  CBC/CMP/CRP were monitored.  Respiratory status improved on the above measures.  He is s/p remdesivir treatment.  He will continue Decadron p.o. for total of 10 days.  He was switched from nebulizers to metered-dose inhalers bronchodilators at the time of discharge.  He has finished a course of Zithromax.  Pulmonary was okay with discharge.  He is to continue using his CPAP and oxygen at home.  2.  Hyperglycemia.  Steroid-induced.  3.  History of hypertension/coronary artery disease/ischemic cardiomyopathy/chronic systolic congestive heart failure/paroxysmal atrial fibrillation/intracardiac thrombus.  Patient last 2D echo on 8/2023 and is as mentioned above.  There was no evidence of angina or congestive heart failure during this admission with good blood pressure control and good rate control.  He was maintained on his Eliquis/Lipitor/aspirin/Lopressor he is to follow-up with his cardiologist as an outpatient  4.  Iron deficiency anemia.  Anemia workup indicated iron deficiency.  Hemoglobin remained stable around 12.  Given the patient's poor prognosis I will leave it to the primary MD whether he needs GI workup but I really doubt that  5.  Hospice  patient.  Hospice to continue follow-up with the patient at his home      At the time of discharge patient was hemodynamically stable.  Pulmonary okay to discharge.  He is being discharged home with hospice care.  Consultants     Consult Orders (all) (From admission, onward)       Start     Ordered    04/11/25 1450  Inpatient Hospice / [facility] Consult  Once        Comments: Follow for readmission to home with hospice at [facility].   Specialty:  Hospice and Palliative Medicine  Provider:  (Not yet assigned)    04/11/25 1449    04/11/25 0018  Inpatient Pulmonology Consult  Once        Specialty:  Pulmonary Disease  Provider:  Zelda Colbert MD    04/11/25 0018    04/10/25 1824  [facility] (on-call MD unless specified) Details  Once        Specialty:  Hospitalist  Provider:  (Not yet assigned)    04/10/25 1823                  Procedures     Imaging Results (All)       Procedure Component Value Units Date/Time    XR Chest 1 View [[id number]] Collected: 04/10/25 1647     Updated: 04/10/25 1653    Narrative:      XR CHEST 1 VW-     HISTORY: Male who is 79 years-old, short of breath     TECHNIQUE: Frontal views of the chest     COMPARISON: 4/4/2024     FINDINGS: Heart, mediastinum and pulmonary vasculature are unremarkable.  Sternotomy wires are present. No focal pulmonary consolidation, pleural  effusion, or pneumothorax. Gas-filled loops of bowel are partly  included. No acute osseous process.       Impression:      No focal pulmonary consolidation. Follow-up as clinical  indications persist.     This report was finalized on 4/10/2025 4:50 PM by Dr. Jarred Albarado M.D on Workstation: [id number]               Pertinent Labs     Results from last 7 days   Lab Units 04/16/25  0655 04/15/25  0655 04/14/25  0452 04/13/25  0454   WBC 10*3/mm3 13.80* 12.89* 10.99* 5.03   HEMOGLOBIN g/dL 12.0* 12.6* 12.3* 11.7*   PLATELETS 10*3/mm3 195 220 200 184     Results from last 7 days   Lab Units 04/16/25  0655 04/15/25  0655 04/14/25  0452  "04/13/25 0454   SODIUM mmol/L 136 135* 137 136   POTASSIUM mmol/L 4.7 4.6 4.6 4.5   CHLORIDE mmol/L 104 103 101 102   CO2 mmol/L 24.2 24.1 24.8 26.0   BUN mg/dL 28* 25* 27* 29*   CREATININE mg/dL 0.83 0.83 0.84 0.86   GLUCOSE mg/dL 136* 135* 129* 155*   Estimated Creatinine Clearance: 65.3 mL/min (by C-G formula based on SCr of 0.83 mg/dL).  Results from last 7 days   Lab Units 04/16/25  0655 04/15/25  0655 04/14/25  0452 04/13/25  0454   ALBUMIN g/dL 2.4* 2.8* 3.1* 2.9*   BILIRUBIN mg/dL <0.2 0.2 0.2 <0.2   ALK PHOS U/L 139* 131* 142* 155*   AST (SGOT) U/L 43* 34 20 18   ALT (SGPT) U/L 77* 46* 27 25     Results from last 7 days   Lab Units 04/16/25  0655 04/15/25  0655 04/14/25  0452 04/13/25  0454 04/12/25  0437 04/11/25  0528 04/10/25  1558   CALCIUM mg/dL 7.8* 7.8* 8.2* 8.0*   < > 8.7 9.0   ALBUMIN g/dL 2.4* 2.8* 3.1* 2.9*   < > 3.2* 3.9   MAGNESIUM mg/dL  --   --   --   --   --  2.1 2.3   PHOSPHORUS mg/dL  --   --   --   --   --  3.1 4.0    < > = values in this interval not displayed.       Results from last 7 days   Lab Units 04/15/25  0655 04/13/25  0454 04/11/25  0528 04/10/25  1711 04/10/25  1558   HSTROP T ng/L  --   --   --  32* 32*   PROBNP pg/mL  --   --   --   --  1,824.0*   D DIMER QUANT MCGFEU/mL 1.25* 1.10* 1.01*  --   --            Invalid input(s): \"LDLCALC\"  Results from last 7 days   Lab Units 04/10/25  1711 04/10/25  1645   BLOODCX  No growth at 5 days No growth at 5 days     Imaging Results (Last 24 Hours)       ** No results found for the last 24 hours. **            Test Results Pending at Discharge         Discharge Exam   Physical Exam  Vitals.  Temperature 97.5 a pulse of 83 respiratory rate of 18 and blood pressure 133/66 and O2 sats of 96% on NIPPV.  General.  Elderly gentleman.  He is alert and oriented x 4.  In no apparent pain/distress/diaphoresis.  Normal mood and affect.  On BiPAP.  Eyes.  Pupils equal round and reactive.  Intact extraocular musculature.  No pallor or " jaundice.  Oral cavity.  Moist mucous membrane  Neck.  Supple.  No JVD.  No lymphadenopathy or thyromegaly.  Cardiovascular.  Regular rate and rhythm with no gallops or murmurs  Chest.  Poor bilateral air entry with scattered bilateral expiratory wheezes and harsh bronchial breathing bilaterally     Abdomen.  Soft lax.  No tenderness.  No organomegaly.  No guarding or rebound  Extremities.  No clubbing/cyanosis/edema  CNS.  No acute focal neurological deficits.  Discharge Details        Discharge Medications        New Medications        Instructions Start Date   dexAMETHasone 6 MG tablet  Commonly known as: DECADRON   6 mg, Oral, Daily With Breakfast             Continue These Medications        Instructions Start Date   albuterol sulfate  (90 Base) MCG/ACT inhaler  Commonly known as: PROVENTIL HFA;VENTOLIN HFA;PROAIR HFA   2 puffs, Inhalation, Every 4 Hours PRN      apixaban 5 MG tablet tablet  Commonly known as: ELIQUIS   5 mg, Every 12 Hours Scheduled      atorvastatin 80 MG tablet  Commonly known as: LIPITOR   80 mg, Daily      budesonide-formoterol 160-4.5 MCG/ACT inhaler  Commonly known as: SYMBICORT   2 puffs, Inhalation, 2 Times Daily - RT      famotidine 20 MG tablet  Commonly known as: PEPCID   20 mg, 2 Times Daily      fluticasone 50 MCG/ACT nasal spray  Commonly known as: FLONASE   2 sprays, Nasal, Daily      folic acid 1 MG tablet  Commonly known as: FOLVITE   1 mg, Daily      guaiFENesin 600 MG 12 hr tablet  Commonly known as: MUCINEX   1,200 mg, 2 Times Daily      metoprolol tartrate 25 MG tablet  Commonly known as: LOPRESSOR   25 mg, Oral, 2 Times Daily      polyethylene glycol 17 g packet  Commonly known as: MIRALAX   17 g, Daily      tiotropium bromide monohydrate 2.5 MCG/ACT aerosol solution inhaler  Commonly known as: SPIRIVA RESPIMAT   2 puffs, Inhalation, Daily - RT      vitamin B-12 500 MCG tablet  Commonly known as: CYANOCOBALAMIN   1,000 mcg, Daily      vitamin D 1.25 MG (27861 UT)  capsule capsule  Commonly known as: ERGOCALCIFEROL   50,000 Units, Weekly               No Known Allergies      Discharge Disposition:  Condition: Stable  2.  Nausea    Diet:   Diet Order   Procedures    Diet: Cardiac; Healthy Heart (2-3 Na+); Fluid Consistency: Thin (IDDSI 0)       Activity:   Activity Instructions       Activity as Tolerated      Up WIth Assist              Counseling : No nonsteroid anti-inflammatory medicine    CODE STATUS:    Code Status and Medical Interventions: No CPR (Do Not Attempt to Resuscitate); Limited Support; No intubation (DNI), No cardioversion   Ordered at: 04/10/25 2031     Code Status (Patient has no pulse and is not breathing):    No CPR (Do Not Attempt to Resuscitate)     Medical Interventions (Patient has pulse or is breathing):    Limited Support     Medical Intervention Limits:    No intubation (DNI)       No cardioversion       No future appointments.  Additional Instructions for the Follow-ups that You Need to Schedule       Ambulatory Referral to Home Hospice   As directed      Home hospice to resume care of the patient    Order Comments: Home hospice to resume care of the patient         Call MD With Problems / Concerns   As directed      Instructions: Call MD or return to ER or contact hospice of chest pain/increasing shortness of breath/fever or chills/hemoptysis/nausea or vomiting.    Order Comments: Instructions: Call MD or return to ER or contact hospice of chest pain/increasing shortness of breath/fever or chills/hemoptysis/nausea or vomiting.         Discharge Follow-up with PCP   As directed       Currently Documented PCP:    Billie Ponce MD    PCP Phone Number:    [phone]     Follow Up Details: 1 week.  Acute chronic hypoxemic respiratory failure/COPD/COVID-19 infection/HTN/CAD/A-fib/chronic systolic congestive heart failure/intracardiac thrombus/iron deficient        Discharge Follow-up with Specified Provider: Cardiology; 2 Weeks   As directed       To: Cardiology   Follow Up: 2 Weeks   Follow Up Details: HTN/chronic systolic congestive heart failure/ischemic cardiomyopathy/CAD/A-fib        Discharge Follow-up with Specified Provider: Pulmonary; 2 Weeks   As directed      To: Pulmonary   Follow Up: 2 Weeks   Follow Up Details: Acute on chronic hypoxemic respiratory failure/COVID-19 infection/COPD exacerbation               Follow-up Information       Billie Ponce MD .    Specialty: General Practice  Why: 1 week.  Acute chronic hypoxemic respiratory failure/COPD/COVID-19 infection/HTN/CAD/A-fib/chronic systolic congestive heart failure/intracardiac thrombus/iron deficient  Contact information:  [address] [facility] [address] [phone]                               Time Spent on Discharge:  Greater than 30 minutes      Maryanne Dover MD  [facility]  04/16/25  10:46 EDT

## 2025-04-16 NOTE — CASE MANAGEMENT/SOCIAL WORK
Continued Stay Note  [facility]     Patient Name: Wisam Malone  MRN: [id number]  Today's Date: 4/16/2025    Admit Date: 4/10/2025    Plan: Home with hospice via EMS   Discharge Plan       Row Name 04/16/25 1337       Plan    Plan Home with hospice via EMS    Patient/Family in Agreement with Plan yes    Plan Comments Spoke with Dr. Aldana, he has cleared for dc home, pt family doesn't need to bring in bipap. Spoke with Dr. Dover, pt ready for dc today, plan is home with hospice care via EMS. Spoke with Eneida/Hospice, she will schedule appt with patient/family to restart hospice services at home. Unsure if mtg will be prior to dc at hospital or at home. Pt will need EMS transport, form in [facility] office. Spoke with patient wife Nicki, she is agreeable with dc plan, she will await call from hospice. Noted recommendations for f/u with specialty providers, pt is homebound and will be current with hospice, spoke to [place] at Hospice and they will plan to meet family once he arrives home. EMS transport requested, await scheduling. -Brinda WASHINGTON                   Discharge Codes    No documentation.                 Expected Discharge Date and Time       Expected Discharge Date Expected Discharge Time    Apr 16, 2025               Brinda Fernandes RN